# Patient Record
Sex: MALE | Race: WHITE | NOT HISPANIC OR LATINO | URBAN - METROPOLITAN AREA
[De-identification: names, ages, dates, MRNs, and addresses within clinical notes are randomized per-mention and may not be internally consistent; named-entity substitution may affect disease eponyms.]

---

## 2021-06-07 NOTE — PROGRESS NOTES
SUBJECTIVE:   CC: Bryan Salazar is an 41 year old who presents for preventive health visit.     Patient has been advised of split billing requirements and indicates understanding: Yes  Healthy Habits:    Do you get at least three servings of calcium containing foods daily (dairy, green leafy vegetables, etc.)? Yes. Working with a      Amount of exercise or daily activities, outside of work: 30-40 min cardio a day, 45-60 min a day, 5-6 times a week    Problems taking medications regularly No    Medication side effects: Yes - Prior Truvada. Was started on Descovy for renal and liver issues. Still has elevated LFTs.     Have you had an eye exam in the past two years? Yes- glasses and contacts    Do you see a dentist twice per year? Been a year    Do you have sleep apnea, excessive snoring or daytime drowsiness?no    Today's PHQ-2 Score:   PHQ-2 ( 1999 Pfizer) 6/8/2021   Q1: Little interest or pleasure in doing things 0   Q2: Feeling down, depressed or hopeless 0   PHQ-2 Score 0     Abuse: Current or Past(Physical, Sexual or Emotional)- No  Do you feel safe in your environment? Yes        Chosen family in [place]   Sir here in [facility].      Social History     Tobacco Use     Smoking status: Former Smoker     Quit date: 1/1/2018     Years since quitting: 3.4     Smokeless tobacco: Never Used   Substance Use Topics     Alcohol use: Yes     Comment: 1-2 drinks a month     If you drink alcohol do you typically have >3 drinks per day or >7 drinks per week? No                      Last PSA: No results found for: PSA    Reviewed orders with patient. Reviewed health maintenance and updated orders accordingly - Yes    Reviewed and updated as needed this visit by clinical staff  Tobacco  Allergies  Meds   Med Hx  Surg Hx  Fam Hx  Soc Hx        Reviewed and updated as needed this visit by Provider  Tobacco   Meds   Med Hx  Surg Hx  Fam Hx  Soc Hx       Past Medical History:   Diagnosis Date     Hypertension       Past  "Surgical History:   Procedure Laterality Date     NO HISTORY OF SURGERY         ROS:  CONSTITUTIONAL: NEGATIVE for fever, chills, change in weight  INTEGUMENTARY/SKIN: NEGATIVE for worrisome rashes, moles or lesions  EYES: NEGATIVE for vision changes or irritation  ENT: NEGATIVE for ear, mouth and throat problems  RESP: NEGATIVE for significant cough or SOB  CV: NEGATIVE for chest pain, palpitations or peripheral edema  GI: NEGATIVE for nausea, abdominal pain, heartburn, or change in bowel habits   male: negative for dysuria, hematuria, decreased urinary stream, erectile dysfunction, urethral discharge  MUSCULOSKELETAL: NEGATIVE for significant arthralgias or myalgia other than low back pain  NEURO: NEGATIVE for weakness, dizziness or paresthesias  PSYCHIATRIC: NEGATIVE for changes in mood or affect    OBJECTIVE:   /70   Pulse 94   Temp 98.9  F (37.2  C) (Oral)   Resp 16   Ht 1.82 m (5' 11.65\")   Wt 104.4 kg (230 lb 3.2 oz)   SpO2 96%   BMI 31.52 kg/m    EXAM:  GENERAL: healthy, alert and no distress  EYES: Eyes grossly normal to inspection, PERRL and conjunctivae and sclerae normal  HENT: ear canals and TM's normal, nose and mouth without ulcers or lesions  NECK: no adenopathy, no asymmetry, masses, or scars and thyroid normal to palpation  RESP: lungs clear to auscultation - no rales, rhonchi or wheezes  CV: regular rate and rhythm, normal S1 S2, no S3 or S4, no murmur, click or rub, no peripheral edema and peripheral pulses strong  ABDOMEN: soft, nontender, no hepatosplenomegaly, no masses and bowel sounds normal  MS: no gross musculoskeletal defects noted, no edema  SKIN: no suspicious lesions or rashes  NEURO: Normal strength and tone, mentation intact and speech normal  PSYCH: mentation appears normal, affect normal/bright    ASSESSMENT/PLAN:   Bryan was seen today for physical, labs only and back pain.    Diagnoses and all orders for this visit:    Routine general medical examination at [facility]" care facility  Establishing care today.  Please see further below.    Contact with and (suspected) exposure to human immunodeficiency virus (hiv)  Reviewed records, below. Does not need refill at this time.     Essential hypertension  On lisinopril.  Does not need refills today.    BRENDA (obstructive sleep apnea)    Hypogonadism male  Elevated liver enzymes  -     Testosterone total; Future  -     Comprehensive metabolic panel; Future  -     Lipid panel; Future  -     CBC with platelets; Future  Due for hypogonadism labs.  Also known transaminase elevation and is due for recheck.  Concerned that this may be related to testosterone, but may also be related to supplements that he is taking as well as other factors.  We will plan to follow-up in 1 month for this.    Strain of lumbar region, initial encounter  -     cyclobenzaprine (FLEXERIL) 5 MG tablet; Take 1-2 tablets (5-10 mg) by mouth 3 times daily as needed for muscle spasms    History of latent syphilis  Reviewed records below  Immunizations reviewed and up to date  Reviewed records as below, patient requested to send in Covid vaccination card.  Advance care planning    Unknown status of immunity to COVID-19 virus  -     COVID-19 Parth RBD Allison & Titer Reflex; Future      Patient has been advised of split billing requirements and indicates understanding: Yes  COUNSELING:  Reviewed preventive health counseling, as reflected in patient instructions       Regular exercise       Healthy diet/nutrition       Vision screening       Hearing screening       Alcohol Use        Safe sex practices/STD prevention       Syphilis screening for high risk patients        HIV screeninx in teen years, 1x in adult years, and at intervals if high risk     Tdap within 5 years.  COVID shots, Pfizer, in [place] 2021-is sending an immunization card.    Reviewed outside records from [facility].   Reviewed serofast RPR ranging from 1: 2-1: 4 from  "Oct 2018 through May 2021.  Noted documentation of treatment November 2017.  November 2017 titer was 1: 16.  RPR improved to 1: 4 in October 2018.    Note normal liver enzymes in May 2020, with mild persistent elevation of AST and ALT beginning July 2020.  Also note from October 2020, negative hep B surface antigen, negative hep B core antibody, hep B surface antibody showing immunity.  Hep C antibody negative.  From 2018, hepatitis A antibody IgM negative.      Estimated body mass index is 31.52 kg/m  as calculated from the following:    Height as of this encounter: 1.82 m (5' 11.65\").    Weight as of this encounter: 104.4 kg (230 lb 3.2 oz).    Weight management plan: Discussed healthy diet and exercise guidelines    He reports that he quit smoking about 3 years ago. He has never used smokeless tobacco.      Counseling Resources:  ATP IV Guidelines  Pooled Cohorts Equation Calculator  FRAX Risk Assessment  ICSI Preventive Guidelines  Dietary Guidelines for Americans, 2010  USDA's MyPlate  ASA Prophylaxis  Lung CA Screening    Jovani Montesinos DO  [facility]  "

## 2021-06-07 NOTE — PATIENT INSTRUCTIONS
Preventive Health Recommendations  Ages 40 to 49    Yearly exam:             See your health care provider every year in order to  o   Review health changes.   o   Discuss preventive care.    o   Review your medicines if your doctor has prescribed any.    You should be tested each year for STDs (sexually transmitted diseases) if you re at risk.     Have a cholesterol test every 5 years.     Have a colonoscopy (test for colon cancer) if someone in your family has had colon cancer or polyps before age 50.     After age 45, have a diabetes test (fasting glucose). If you are at risk for diabetes, you should have this test every 3 years.      Talk with your health care provider about whether or not a prostate cancer screening test (PSA) is right for you.    Shots: Get a flu shot each year. Get a tetanus shot every 10 years.     Nutrition:    Eat at least 5 servings of fruits and vegetables daily.     Eat whole-grain bread, whole-wheat pasta and brown rice instead of white grains and rice.     Get adequate Calcium and Vitamin D.     Lifestyle    Exercise for at least 150 minutes a week (30 minutes a day, 5 days a week). This will help you control your weight and prevent disease.     Limit alcohol to one drink per day.     No smoking.     Wear sunscreen to prevent skin cancer.     See your dentist every six months for an exam and cleaning.

## 2021-06-08 ENCOUNTER — OFFICE VISIT (OUTPATIENT)
Dept: FAMILY MEDICINE | Facility: CLINIC | Age: 41
End: 2021-06-08
Payer: COMMERCIAL

## 2021-06-08 VITALS
RESPIRATION RATE: 16 BRPM | BODY MASS INDEX: 31.18 KG/M2 | OXYGEN SATURATION: 96 % | WEIGHT: 230.2 LBS | HEIGHT: 72 IN | DIASTOLIC BLOOD PRESSURE: 70 MMHG | HEART RATE: 94 BPM | TEMPERATURE: 98.9 F | SYSTOLIC BLOOD PRESSURE: 106 MMHG

## 2021-06-08 DIAGNOSIS — E29.1 HYPOGONADISM MALE: ICD-10-CM

## 2021-06-08 DIAGNOSIS — Z92.29 IMMUNIZATIONS REVIEWED AND UP TO DATE: ICD-10-CM

## 2021-06-08 DIAGNOSIS — Z71.89 ADVANCE CARE PLANNING: ICD-10-CM

## 2021-06-08 DIAGNOSIS — Z78.9 UNKNOWN STATUS OF IMMUNITY TO COVID-19 VIRUS: ICD-10-CM

## 2021-06-08 DIAGNOSIS — G47.33 OSA (OBSTRUCTIVE SLEEP APNEA): ICD-10-CM

## 2021-06-08 DIAGNOSIS — Z00.00 ROUTINE GENERAL MEDICAL EXAMINATION AT A HEALTH CARE FACILITY: Primary | ICD-10-CM

## 2021-06-08 DIAGNOSIS — Z86.19 HISTORY OF LATENT SYPHILIS: ICD-10-CM

## 2021-06-08 DIAGNOSIS — I10 ESSENTIAL HYPERTENSION: ICD-10-CM

## 2021-06-08 DIAGNOSIS — Z20.6 CONTACT WITH AND (SUSPECTED) EXPOSURE TO HUMAN IMMUNODEFICIENCY VIRUS (HIV): ICD-10-CM

## 2021-06-08 DIAGNOSIS — R74.8 ELEVATED LIVER ENZYMES: ICD-10-CM

## 2021-06-08 DIAGNOSIS — S39.012A STRAIN OF LUMBAR REGION, INITIAL ENCOUNTER: ICD-10-CM

## 2021-06-08 PROCEDURE — 99386 PREV VISIT NEW AGE 40-64: CPT | Mod: 25 | Performed by: STUDENT IN AN ORGANIZED HEALTH CARE EDUCATION/TRAINING PROGRAM

## 2021-06-08 PROCEDURE — 99213 OFFICE O/P EST LOW 20 MIN: CPT | Mod: 25 | Performed by: STUDENT IN AN ORGANIZED HEALTH CARE EDUCATION/TRAINING PROGRAM

## 2021-06-08 RX ORDER — CYCLOBENZAPRINE HCL 5 MG
5-10 TABLET ORAL 3 TIMES DAILY PRN
Qty: 60 TABLET | Refills: 1 | Status: SHIPPED | OUTPATIENT
Start: 2021-06-08 | End: 2022-03-18

## 2021-06-08 RX ORDER — TESTOSTERONE CYPIONATE 200 MG/ML
200 INJECTION, SOLUTION INTRAMUSCULAR WEEKLY
COMMUNITY
Start: 2021-06-08 | End: 2024-05-20

## 2021-06-08 RX ORDER — TRAZODONE HYDROCHLORIDE 50 MG/1
50 TABLET, FILM COATED ORAL AT BEDTIME
COMMUNITY
End: 2021-08-10

## 2021-06-08 RX ORDER — LISINOPRIL 5 MG/1
5 TABLET ORAL DAILY
COMMUNITY
End: 2021-08-10

## 2021-06-08 ASSESSMENT — MIFFLIN-ST. JEOR: SCORE: 1981.69

## 2021-06-11 DIAGNOSIS — E29.1 HYPOGONADISM MALE: ICD-10-CM

## 2021-06-11 DIAGNOSIS — Z78.9 UNKNOWN STATUS OF IMMUNITY TO COVID-19 VIRUS: ICD-10-CM

## 2021-06-11 LAB
ALBUMIN SERPL-MCNC: 4 G/DL (ref 3.4–5)
ALP SERPL-CCNC: 42 U/L (ref 40–150)
ALT SERPL W P-5'-P-CCNC: 127 U/L (ref 0–70)
ANION GAP SERPL CALCULATED.3IONS-SCNC: 5 MMOL/L (ref 3–14)
AST SERPL W P-5'-P-CCNC: 100 U/L (ref 0–45)
BILIRUB SERPL-MCNC: 0.6 MG/DL (ref 0.2–1.3)
BUN SERPL-MCNC: 20 MG/DL (ref 7–30)
CALCIUM SERPL-MCNC: 8.9 MG/DL (ref 8.5–10.1)
CHLORIDE SERPL-SCNC: 105 MMOL/L (ref 94–109)
CHOLEST SERPL-MCNC: 115 MG/DL
CO2 SERPL-SCNC: 26 MMOL/L (ref 20–32)
CREAT SERPL-MCNC: 0.89 MG/DL (ref 0.66–1.25)
ERYTHROCYTE [DISTWIDTH] IN BLOOD BY AUTOMATED COUNT: 14.3 % (ref 10–15)
GFR SERPL CREATININE-BSD FRML MDRD: >90 ML/MIN/{1.73_M2}
GLUCOSE SERPL-MCNC: 80 MG/DL (ref 70–99)
HCT VFR BLD AUTO: 50 % (ref 40–53)
HDLC SERPL-MCNC: 34 MG/DL
HGB BLD-MCNC: 17 G/DL (ref 13.3–17.7)
LDLC SERPL CALC-MCNC: 60 MG/DL
MCH RBC QN AUTO: 29.5 PG (ref 26.5–33)
MCHC RBC AUTO-ENTMCNC: 34 G/DL (ref 31.5–36.5)
MCV RBC AUTO: 87 FL (ref 78–100)
NONHDLC SERPL-MCNC: 81 MG/DL
PLATELET # BLD AUTO: 242 10E9/L (ref 150–450)
POTASSIUM SERPL-SCNC: 4.1 MMOL/L (ref 3.4–5.3)
PROT SERPL-MCNC: 7.7 G/DL (ref 6.8–8.8)
RBC # BLD AUTO: 5.77 10E12/L (ref 4.4–5.9)
SODIUM SERPL-SCNC: 136 MMOL/L (ref 133–144)
TRIGL SERPL-MCNC: 103 MG/DL
WBC # BLD AUTO: 5.9 10E9/L (ref 4–11)

## 2021-06-11 PROCEDURE — 86769 SARS-COV-2 COVID-19 ANTIBODY: CPT | Mod: 59 | Performed by: STUDENT IN AN ORGANIZED HEALTH CARE EDUCATION/TRAINING PROGRAM

## 2021-06-11 PROCEDURE — 36415 COLL VENOUS BLD VENIPUNCTURE: CPT | Performed by: STUDENT IN AN ORGANIZED HEALTH CARE EDUCATION/TRAINING PROGRAM

## 2021-06-11 PROCEDURE — 80053 COMPREHEN METABOLIC PANEL: CPT | Performed by: STUDENT IN AN ORGANIZED HEALTH CARE EDUCATION/TRAINING PROGRAM

## 2021-06-11 PROCEDURE — 86769 SARS-COV-2 COVID-19 ANTIBODY: CPT | Performed by: STUDENT IN AN ORGANIZED HEALTH CARE EDUCATION/TRAINING PROGRAM

## 2021-06-11 PROCEDURE — 84403 ASSAY OF TOTAL TESTOSTERONE: CPT | Mod: 90 | Performed by: STUDENT IN AN ORGANIZED HEALTH CARE EDUCATION/TRAINING PROGRAM

## 2021-06-11 PROCEDURE — 85027 COMPLETE CBC AUTOMATED: CPT | Performed by: STUDENT IN AN ORGANIZED HEALTH CARE EDUCATION/TRAINING PROGRAM

## 2021-06-11 PROCEDURE — 99000 SPECIMEN HANDLING OFFICE-LAB: CPT | Performed by: STUDENT IN AN ORGANIZED HEALTH CARE EDUCATION/TRAINING PROGRAM

## 2021-06-11 PROCEDURE — 80061 LIPID PANEL: CPT | Performed by: STUDENT IN AN ORGANIZED HEALTH CARE EDUCATION/TRAINING PROGRAM

## 2021-06-13 LAB
SARS-COV-2 AB PNL SERPL IA: POSITIVE
SARS-COV-2 IGG SERPL IA-ACNC: NORMAL

## 2021-06-15 ENCOUNTER — MYC MEDICAL ADVICE (OUTPATIENT)
Dept: FAMILY MEDICINE | Facility: CLINIC | Age: 41
End: 2021-06-15

## 2021-06-15 DIAGNOSIS — R74.8 ELEVATED LIVER ENZYMES: Primary | ICD-10-CM

## 2021-06-15 LAB — TESTOST SERPL-MCNC: 726 NG/DL (ref 240–950)

## 2021-06-25 ENCOUNTER — ANCILLARY PROCEDURE (OUTPATIENT)
Dept: ULTRASOUND IMAGING | Facility: CLINIC | Age: 41
End: 2021-06-25
Attending: FAMILY MEDICINE
Payer: COMMERCIAL

## 2021-06-25 DIAGNOSIS — R74.8 ELEVATED LIVER ENZYMES: ICD-10-CM

## 2021-06-25 PROCEDURE — 76705 ECHO EXAM OF ABDOMEN: CPT | Performed by: RADIOLOGY

## 2021-07-09 ENCOUNTER — TELEPHONE (OUTPATIENT)
Dept: FAMILY MEDICINE | Facility: CLINIC | Age: 41
End: 2021-07-09

## 2021-07-09 NOTE — TELEPHONE ENCOUNTER
2021   1:49 PM     Data/Intervention:  Patient Name:  Bryan CORONADO/Age:  1980 (41 year old)    Spoke With: Bryan     Reason for Call:  ACD f/u     Assessment / Plan:  SW reached out to Bryan to see if they had any questions or to see if they needed help filling out the Advanced Care Directive Form that Dr. Montesinos gave them on 21. Bryan stated at this time they have no questions regarding the ACD form.     HAROLDO Solano  Pronouns: She/Her/Hers  , Care Coordination  [facility]  [phone]

## 2021-09-07 ENCOUNTER — LAB (OUTPATIENT)
Dept: LAB | Facility: CLINIC | Age: 41
End: 2021-09-07
Payer: COMMERCIAL

## 2021-09-07 DIAGNOSIS — Z20.6 CONTACT WITH AND (SUSPECTED) EXPOSURE TO HUMAN IMMUNODEFICIENCY VIRUS (HIV): ICD-10-CM

## 2021-09-07 DIAGNOSIS — R74.8 ELEVATED LIVER ENZYMES: ICD-10-CM

## 2021-09-07 LAB
ALBUMIN SERPL-MCNC: 3.8 G/DL (ref 3.4–5)
ALP SERPL-CCNC: 42 U/L (ref 40–150)
ALT SERPL W P-5'-P-CCNC: 102 U/L (ref 0–70)
AST SERPL W P-5'-P-CCNC: 66 U/L (ref 0–45)
BILIRUB DIRECT SERPL-MCNC: <0.1 MG/DL (ref 0–0.2)
BILIRUB SERPL-MCNC: 0.2 MG/DL (ref 0.2–1.3)
PROT SERPL-MCNC: 7.4 G/DL (ref 6.8–8.8)

## 2021-09-07 PROCEDURE — 87389 HIV-1 AG W/HIV-1&-2 AB AG IA: CPT

## 2021-09-07 PROCEDURE — 36415 COLL VENOUS BLD VENIPUNCTURE: CPT

## 2021-09-07 PROCEDURE — 80076 HEPATIC FUNCTION PANEL: CPT

## 2021-09-07 PROCEDURE — 86593 SYPHILIS TEST NON-TREP QUANT: CPT

## 2021-09-07 PROCEDURE — 86780 TREPONEMA PALLIDUM: CPT

## 2021-09-07 PROCEDURE — 86592 SYPHILIS TEST NON-TREP QUAL: CPT

## 2021-09-08 LAB
HIV 1+2 AB+HIV1 P24 AG SERPL QL IA: NONREACTIVE
RPR SER QL: REACTIVE
RPR SER-TITR: ABNORMAL {TITER}
T PALLIDUM AB SER QL: REACTIVE

## 2021-09-09 NOTE — PROGRESS NOTES
Assessment & Plan     Bryan was seen today for recheck.    Diagnoses and all orders for this visit:    PrEP Candidate  Reviewed labs. No concerns. Will approve 3 months prep when refill request comes through.     Elevated liver enzymes  LFTs improving. Will monitor q6 months     Erectile dysfunction, unspecified erectile dysfunction type  -     tadalafil (CIALIS) 5 MG tablet; Take 1 tablet (5 mg) by mouth every 24 hours  New concern today. Has not tried vaso occlusive devices. Is interested in trialing meds. Discussed the side effects of possible headache, flushing, dyspepsia and transient changes in vision.    The patient is not taking nitrates, and denies he has access to nitrates in any form at any time. I have counseled him that taking this med with nitrates of any form can cause death.     Skin tags, anus or rectum  Skin tags noted, offered cryotherapy vs removal/ clipping. Will plan clipping.     Depressed mood  -     Behavioral Health Referral ([facility] internal and external)  Referral resources in process.    Encounter for immunization  -     TDAP VACCINE (Adacel, Boostrix)  [[id number]]      35 minutes spent on the date of the encounter doing chart review, history and exam, documentation and further activities per the note       Return in about 1 week (around 9/17/2021) for procedure.    Jovani Montesinos DO  [facility]    Shana Adams is a 41 year old who presents for the following health issues     HPI     Liver enzymes:  Improving and trailing off. Reviewed results.      Depression:  Looking for LGBT friendly MH provider.     ED:  Difficulty maintaining in. More when not with a partner.     HIV Prevention / PrEP - Follow up  Bryan is here for follow up concerning pre-exposure prophylaxis for HIV.    Ongoing risk factors for acquiring HIV infection:    Patient is member of high prevalence group (MSM, non-monogmous partnership): no/yes: YES    Any anal sex without condoms in the past  6 months: no/yes: YES    Has a current partner that is HIV positive: no/yes: no     ANY Bacterial STI in the last 6 months: no/yes: no     Last HIV test: pos/neg: negative Date: 9/7/21      Review of Systems   DENIES: Denies lymphadenopathy,fevers,chills,rigors,night sweats,weight loss,oral thrush,mouth lesions,dyspnea,cough,diarrhea, edema.  DENIES: Denies  discharge, rash,   Reports ano-genital lesions.         Objective    /78   Pulse 73   Temp 98.9  F (37.2  C) (Oral)   Resp 16   Wt 96.6 kg (213 lb)   BMI 29.17 kg/m    Body mass index is 29.17 kg/m .  Physical Exam  Constitutional:       General: He is not in acute distress.     Appearance: He is well-developed.   HENT:      Head: Normocephalic and atraumatic.   Eyes:      General: No scleral icterus.     Extraocular Movements: Extraocular movements intact.   Cardiovascular:      Rate and Rhythm: Normal rate.      Heart sounds: Normal heart sounds.   Pulmonary:      Effort: Pulmonary effort is normal. No respiratory distress.   Genitourinary:     Rectum: Tenderness present. Normal anal tone.      Comments: 2 anal skin tags   - 9 o clock, irritated  - 1 o clock with verucousappearance  Neurological:      General: No focal deficit present.      Mental Status: He is alert and oriented to person, place, and time.   Psychiatric:         Thought Content: Thought content normal.

## 2021-09-10 ENCOUNTER — OFFICE VISIT (OUTPATIENT)
Dept: FAMILY MEDICINE | Facility: CLINIC | Age: 41
End: 2021-09-10
Payer: COMMERCIAL

## 2021-09-10 VITALS
WEIGHT: 213 LBS | DIASTOLIC BLOOD PRESSURE: 78 MMHG | BODY MASS INDEX: 29.17 KG/M2 | SYSTOLIC BLOOD PRESSURE: 118 MMHG | TEMPERATURE: 98.9 F | HEART RATE: 73 BPM | RESPIRATION RATE: 16 BRPM

## 2021-09-10 DIAGNOSIS — K64.4 SKIN TAGS, ANUS OR RECTUM: ICD-10-CM

## 2021-09-10 DIAGNOSIS — Z23 ENCOUNTER FOR IMMUNIZATION: ICD-10-CM

## 2021-09-10 DIAGNOSIS — Z20.6 CONTACT WITH AND (SUSPECTED) EXPOSURE TO HUMAN IMMUNODEFICIENCY VIRUS (HIV): Primary | ICD-10-CM

## 2021-09-10 DIAGNOSIS — R74.8 ELEVATED LIVER ENZYMES: ICD-10-CM

## 2021-09-10 DIAGNOSIS — N52.9 ERECTILE DYSFUNCTION, UNSPECIFIED ERECTILE DYSFUNCTION TYPE: ICD-10-CM

## 2021-09-10 DIAGNOSIS — R45.89 DEPRESSED MOOD: ICD-10-CM

## 2021-09-10 PROCEDURE — 99214 OFFICE O/P EST MOD 30 MIN: CPT | Mod: 25 | Performed by: STUDENT IN AN ORGANIZED HEALTH CARE EDUCATION/TRAINING PROGRAM

## 2021-09-10 PROCEDURE — 90715 TDAP VACCINE 7 YRS/> IM: CPT | Performed by: STUDENT IN AN ORGANIZED HEALTH CARE EDUCATION/TRAINING PROGRAM

## 2021-09-10 PROCEDURE — 90471 IMMUNIZATION ADMIN: CPT | Performed by: STUDENT IN AN ORGANIZED HEALTH CARE EDUCATION/TRAINING PROGRAM

## 2021-09-10 RX ORDER — TADALAFIL 5 MG/1
5 TABLET ORAL EVERY 24 HOURS
Qty: 30 TABLET | Refills: 1 | Status: SHIPPED | OUTPATIENT
Start: 2021-09-10 | End: 2021-11-03

## 2021-09-10 NOTE — PATIENT INSTRUCTIONS
Call ins about HPV shots (Gardasil)   director to give resources about mental health  Schedule skin tag visit

## 2021-09-17 ENCOUNTER — OFFICE VISIT (OUTPATIENT)
Dept: FAMILY MEDICINE | Facility: CLINIC | Age: 41
End: 2021-09-17
Payer: COMMERCIAL

## 2021-09-17 VITALS
BODY MASS INDEX: 31.19 KG/M2 | TEMPERATURE: 98.7 F | SYSTOLIC BLOOD PRESSURE: 135 MMHG | WEIGHT: 227.8 LBS | DIASTOLIC BLOOD PRESSURE: 77 MMHG | HEART RATE: 84 BPM | OXYGEN SATURATION: 96 % | RESPIRATION RATE: 16 BRPM

## 2021-09-17 DIAGNOSIS — L91.8 SKIN TAG: Primary | ICD-10-CM

## 2021-09-17 DIAGNOSIS — Z23 NEED FOR PROPHYLACTIC VACCINATION AND INOCULATION AGAINST INFLUENZA: ICD-10-CM

## 2021-09-17 PROCEDURE — 11200 RMVL SKIN TAGS UP TO&INC 15: CPT | Performed by: STUDENT IN AN ORGANIZED HEALTH CARE EDUCATION/TRAINING PROGRAM

## 2021-09-17 PROCEDURE — 99207 PR CDG-PROCEDURE CHARGE ONLY: CPT | Performed by: STUDENT IN AN ORGANIZED HEALTH CARE EDUCATION/TRAINING PROGRAM

## 2021-09-17 PROCEDURE — 90686 IIV4 VACC NO PRSV 0.5 ML IM: CPT | Performed by: STUDENT IN AN ORGANIZED HEALTH CARE EDUCATION/TRAINING PROGRAM

## 2021-09-17 PROCEDURE — 90471 IMMUNIZATION ADMIN: CPT | Performed by: STUDENT IN AN ORGANIZED HEALTH CARE EDUCATION/TRAINING PROGRAM

## 2021-09-17 NOTE — PATIENT INSTRUCTIONS
What is a skin biopsy?   A skin biopsy is the removal of a small piece of skin for lab tests. It may be done to help diagnose a problem with the skin.   When is it used?   A skin biopsy will help your healthcare provider make a diagnosis of your problem. For example:   You may have an internal disease that a skin biopsy may explain.   You may have a skin disease or cancer.   Your skin may have become discolored.   Your skin may be inflamed.   Alternatives to this procedure include:   to proceed with treatment without a diagnosis   to choose not to have treatment, recognizing the risks of your condition   to take a watchful approach and reevaluate the lesion or disorder at a future time.   When should I call my healthcare provider?   Call your provider right away if:   You have bleeding that cannot be stopped by putting pressure on the wound.   Your wound becomes red or has pus or you develop a fever (signs of infection).   You have significant pain that is not controlled with ibuprofen or tylenol.     Wound Care  1. Keep it covered for the first 2-3 days with a band aid, or if it is a large wound or is draining a lot, a small piece of gauze with tape.  2. Apply a thin film of vaseline over the area to keep it mildly moist and prevent scab formation.   3. Change your bandaid daily.  4. As you heal, the new skin will be very sensitive to sun - please protect your healing wound by covering up and using sunscreen.   5. Results will be mailed or called to you. It can take up to 10 days to get biopsy results back. If you do not hear from us, please call us at [phone] and let us know that you haven't received your results.

## 2021-09-17 NOTE — PROGRESS NOTES
[facility]  Procedure Note    Bryan Salazar is a patient of Jovani Dang here for skin tag removal   Indication: inflamed skin tag.    Consent: Affirmation of informed consent was obtained. Risks, benefits and alternatives were discussed. Patient's questions were elicited and answered.   Procedure safety checklist was completed:  Yes  Time Out (Pause for the Cause) completed: Yes    Labs: none    Preoperative Diagnosis: Skin tag with verrucous appearance   Postoperative Diagnosis: same    Technique:   Skin prep Chloraprep  Anesthesia -none  Suture  No   EBL: minimal  Complications:  No  Tolerance:  Pt tolerated procedure well and was in stable condition.   Patholgy sent: No- declined    Follow up: Pt was instructed to call if bleeding, severe pain or foul smell.   Follow up in 2-3 months, unless problems.     Jovani Montesinos DO, MA  Pronouns: he/him/his    [facility]/ [facility]    Department of Family Medicine and Community Health       Addendum 8/11/2022  Preoperative diagnosis, skin tag, verrucous, irritated and inflamed  Postop same.    Additional history: Skin tag was removed due to history of bleeding and documented irritation in 9/10/21 progress note. Removal is medically indicated and is not cosmetic in nature.     Jovani Montesinos DO

## 2021-10-19 ENCOUNTER — OFFICE VISIT (OUTPATIENT)
Dept: FAMILY MEDICINE | Facility: CLINIC | Age: 41
End: 2021-10-19
Payer: COMMERCIAL

## 2021-10-19 VITALS
SYSTOLIC BLOOD PRESSURE: 132 MMHG | BODY MASS INDEX: 31.5 KG/M2 | TEMPERATURE: 98.8 F | DIASTOLIC BLOOD PRESSURE: 84 MMHG | RESPIRATION RATE: 16 BRPM | HEART RATE: 77 BPM | WEIGHT: 230 LBS | OXYGEN SATURATION: 96 %

## 2021-10-19 DIAGNOSIS — R30.0 BURNING WITH URINATION: Primary | ICD-10-CM

## 2021-10-19 DIAGNOSIS — N34.2 URETHRITIS: ICD-10-CM

## 2021-10-19 DIAGNOSIS — R36.9 PENILE DISCHARGE: ICD-10-CM

## 2021-10-19 LAB
ALBUMIN UR-MCNC: NEGATIVE MG/DL
APPEARANCE UR: CLEAR
BACTERIA #/AREA URNS HPF: ABNORMAL /HPF
BILIRUB UR QL STRIP: NEGATIVE
COLOR UR AUTO: YELLOW
GLUCOSE UR STRIP-MCNC: NEGATIVE MG/DL
HGB UR QL STRIP: ABNORMAL
HIV 1+2 AB+HIV1 P24 AG SERPL QL IA: NONREACTIVE
KETONES UR STRIP-MCNC: NEGATIVE MG/DL
LEUKOCYTE ESTERASE UR QL STRIP: ABNORMAL
NITRATE UR QL: NEGATIVE
PH UR STRIP: 7 [PH] (ref 5–8)
RBC #/AREA URNS AUTO: ABNORMAL /HPF
SP GR UR STRIP: 1.02 (ref 1–1.03)
UROBILINOGEN UR STRIP-ACNC: 0.2 E.U./DL
WBC #/AREA URNS AUTO: ABNORMAL /HPF

## 2021-10-19 PROCEDURE — 99214 OFFICE O/P EST MOD 30 MIN: CPT | Mod: 25 | Performed by: STUDENT IN AN ORGANIZED HEALTH CARE EDUCATION/TRAINING PROGRAM

## 2021-10-19 PROCEDURE — 96372 THER/PROPH/DIAG INJ SC/IM: CPT | Mod: GC | Performed by: FAMILY MEDICINE

## 2021-10-19 PROCEDURE — 36415 COLL VENOUS BLD VENIPUNCTURE: CPT | Performed by: STUDENT IN AN ORGANIZED HEALTH CARE EDUCATION/TRAINING PROGRAM

## 2021-10-19 PROCEDURE — 81001 URINALYSIS AUTO W/SCOPE: CPT | Performed by: STUDENT IN AN ORGANIZED HEALTH CARE EDUCATION/TRAINING PROGRAM

## 2021-10-19 PROCEDURE — 87591 N.GONORRHOEAE DNA AMP PROB: CPT | Performed by: STUDENT IN AN ORGANIZED HEALTH CARE EDUCATION/TRAINING PROGRAM

## 2021-10-19 PROCEDURE — 87389 HIV-1 AG W/HIV-1&-2 AB AG IA: CPT | Performed by: STUDENT IN AN ORGANIZED HEALTH CARE EDUCATION/TRAINING PROGRAM

## 2021-10-19 PROCEDURE — 87491 CHLMYD TRACH DNA AMP PROBE: CPT | Performed by: STUDENT IN AN ORGANIZED HEALTH CARE EDUCATION/TRAINING PROGRAM

## 2021-10-19 RX ORDER — AZITHROMYCIN 500 MG/1
2000 TABLET, FILM COATED ORAL DAILY
Qty: 4 TABLET | Refills: 0 | Status: SHIPPED | OUTPATIENT
Start: 2021-10-19 | End: 2021-10-20

## 2021-10-19 RX ORDER — CEFTRIAXONE 1 G/1
1000 INJECTION, POWDER, FOR SOLUTION INTRAMUSCULAR; INTRAVENOUS ONCE
Qty: 10 ML | Refills: 0 | Status: SHIPPED | OUTPATIENT
Start: 2021-10-19 | End: 2021-10-19 | Stop reason: ALTCHOICE

## 2021-10-19 RX ORDER — CEFTRIAXONE SODIUM 1 G
1 VIAL (EA) INJECTION ONCE
Status: COMPLETED | OUTPATIENT
Start: 2021-10-19 | End: 2021-10-19

## 2021-10-19 RX ORDER — AZITHROMYCIN 500 MG/1
2000 TABLET, FILM COATED ORAL ONCE
Status: CANCELLED | OUTPATIENT
Start: 2021-10-19 | End: 2021-10-19

## 2021-10-19 RX ADMIN — Medication 1 G: at 11:17

## 2021-10-19 NOTE — PROGRESS NOTES
Assessment & Plan     Burning with urination  Penile discharge  Urethritis  Irritation on ventral glans penis for 4 days with dysuria for 1 day in the setting of history of sex without barrier protection. Physical exam significant for opaque white/yellow urethral discharge, which patient had not previously noted. Given appearance of urethral discharge in the clinical setting of unprotected sex, etiology of urethritis is likely STI. Dysuria could potentially represent a UTI, but this is less likely given lack of other urinary and suprapubic pain. Less likely irritant contact dermatitis given exam, lack of new products on skin or eczema.  Obtained UA with reflex to microscopy and PCR for Chlamydia/gonorrheae. Although patient is on PreP and is adherent to medication, HIV testing was offered today and was accepted by patient. UA returned negative for nitrates, essentially ruling out UTI. PCR pending.  - Plan to prophylactically treat for suspected gonorrheae/chlamydia infection with 1 g ceftriaxone IM injection delivered in clinic. Patient is allergic to Doxycycline so will offer alterative treatment with azithromycin 2g one dose  - advised to refrain from sexual intercourse until symptoms resolve  - advised to make partners aware of current STI work up and treatment  - UA reflex to Microscopic  - HIV Antigen Antibody Combo  - Urine Microscopic Exam  - Chlamydia trachomatis/Neisseria gonorrhoeae by PCR - Clinic Collect  - azithromycin (ZITHROMAX) 500 MG tablet  Dispense: 4 tablet; Refill: 0  - cefTRIAXone (ROCEPHIN) injection 1 g    No follow-ups on file.    Lalita Escobedo, MS3  [facility]    I was present with the medical student who participated in the service and in the documentation of this note. I have verified the history and personally performed the physical exam and medical decision making. I have verified and edited the note, which accurately reflects my assessment of the patient and the  plan of care.    Lyubov Cervantes, DO   she/her  PGY-2    Subjective   Bryan is a 41 year old who presents for the following health issues    HPI   Noticed irritation on underside of penis on Saturday when erect and with contact/friction.   Over last 24 hours, has developed burning with urination.   Has had PA piercing before. Reports irritation feels similar. Piercing not currently in.   Denies any new lubrication or products on/around penis    Endorses some swelling on the underside of penis. Possibly some redness/skin irritation. Has not noticed ulcers, wounds, blisters, or other lesions. Has not noticed blood, pus, or other discharge from penis.   No blood in urine. No suprapubic, abdominal, or back pain. No urinary frequency. No use of new lubricants or products that have come in contact with skin of penis.   NOTE: urethral discharge present on physical exam (see below). This was the first time he had observed discharge.     Is circumcised.    3 sexual partners, 2 new. All partners male. Engages in penetrative and receptive anal and oral sex.   Does not use condoms.  Discussed STI and HIV status with new partners. Neither were concerned for STI.   On PreP. Does not miss doses. Negative HIV anitgen/antibody testing in September.    History of latent syphilis and gonorrhea years ago. Treponema antibodies titers remain low (1:2) from labs in 9/2021  Treatment of gonorrhea years ago led to discovery of doxycycline allergy (anaphylaxis)    Review of Systems   Constitutional, HEENT, cardiovascular, pulmonary, gi and gu systems are negative, except as otherwise noted.      Objective    /84   Pulse 77   Temp 98.8  F (37.1  C) (Oral)   Resp 16   Wt 104.3 kg (230 lb)   SpO2 96%   BMI 31.50 kg/m    Body mass index is 31.5 kg/m .     Physical Exam   GENERAL: healthy, alert and no distress   (male): Normal male genitalia. No ulcers or lesions. Skin of ventral glans penis slightly erythematous.  Opaque off  white/slightly yellow discharge from urethra and in site of piercing on ventral side of glans penis. No tenderness.     Results for orders placed or performed in visit on 10/19/21 (from the past 24 hour(s))   UA reflex to Microscopic   Result Value Ref Range    Color Urine Yellow Colorless, Straw, Light Yellow, Yellow    Appearance Urine Clear Clear    Glucose Urine Negative Negative mg/dL    Bilirubin Urine Negative Negative    Ketones Urine Negative Negative mg/dL    Specific Gravity Urine 1.020 1.005 - 1.030    Blood Urine Moderate (A) Negative    pH Urine 7.0 5.0 - 8.0    Protein Albumin Urine Negative Negative mg/dL    Urobilinogen Urine 0.2 0.2, 1.0 E.U./dL    Nitrite Urine Negative Negative    Leukocyte Esterase Urine Moderate (A) Negative   Urine Microscopic Exam   Result Value Ref Range    Bacteria Urine Few (A) None Seen /HPF    RBC Urine 5-10 (A) 0-2 /HPF /HPF    WBC Urine 10-25 (A) 0-5 /HPF /HPF    Narrative    Some Clumps of WBC seen       ----- Services Performed by a MEDICAL STUDENT in Presence of RESIDENT/FELLOW Physician-------

## 2021-10-19 NOTE — NURSING NOTE
Clinic Administered Medication Documentation          Injectable Medication Documentation    Patient was given Ceftriaxone Sodium (Rocephin). Prior to medication administration, verified patients identity using patient s name and date of birth. Please see MAR and medication order for additional information. Patient instructed to remain in clinic for 15 minutes.      Was entire vial of medication used? Yes  Vial/Syringe: Single dose vial  Expiration Date:  02/2022  Was this medication supplied by the patient? No    Name of provider who requested the medication administration: Dr. Cervantes  Name of provider on site (faculty or community preceptor) at the time of performing the medication administration: Dr.Keyhani Kiki Velez, [facility]

## 2021-10-19 NOTE — PATIENT INSTRUCTIONS
Patient Education   Here is the plan from today's visit    1. Burning with urination  We will call you if the results of your test is positive. We are treating you prophylactically with one IM antibiotic and the other is 4 pills to be taken at once later today.  Please tell your sexual partners that they should get tested for STIs    - UA reflex to Microscopic; Future  - HIV Antigen Antibody Combo; Future  - HIV Antigen Antibody Combo  - cefTRIAXone (ROCEPHIN) injection 1 g  - UA reflex to Microscopic  - Chlamydia trachomatis/Neisseria gonorrhoeae by PCR - Clinic Collect  - azithromycin (ZITHROMAX) 500 MG tablet; Take 4 tablets (2,000 mg) by mouth daily for 1 day  Dispense: 4 tablet; Refill: 0  - cefTRIAXone (ROCEPHIN) injection 1 g      Please call or return to clinic if your symptoms don't go away.    Follow up plan  No follow-ups on file.    Thank you for coming to [facility] today.  COVID-19 Vaccine:  [facility] has walk-in appointments for COVID-19 vaccines. No appointment needed!   You also have the option of receiving Pfizer vaccine during your physician appointment. Please ask your care team for more information!  Lab Testing:  **If you had lab testing today and your results are reassuring or normal they will be mailed to you or sent through [employer] within 7 days.   **If the lab tests need quick action we will call you with the results.  **If you are having labs done on a different day, please call [phone] to schedule at [facility] or [phone] for other [facility] locations.   The phone number we will call with results is # [phone] (home) . If this is not the best number please call our clinic and change the number.  Medication Refills:  If you need any refills please call your pharmacy and they will contact us.   If you need to  your refill at a new pharmacy, please contact the new pharmacy directly. The new pharmacy will help you get your medications  transferred faster.   Scheduling:  If you have any concerns about today's visit or wish to schedule another appointment please call our office during normal business hours [phone] (8-5:00 M-F)  If a referral was made to a [facility] and you don't get a call from central scheduling please call [phone].  If a Mammogram was ordered for you at [facility] call [phone] to schedule or change your appointment.  If you had an EKG/XRay/CT/Ultrasound/MRI ordered the number is [phone] to schedule or change your radiology appointment.   Medical Concerns:  If you have urgent medical concerns please call [phone] at any time of the day.    Lyubov Cervantes, DO       Patient Education     Urethritis Due to Gonorrhea or Chlamydia (Adult male)     You have urethritis. This is an inflammation in the urethra. The urethra is the tube between the bladder and the tip of the penis. Urine drains out of the body through the urethra. There are 2 main types of this condition:    Gonococcal urethritis (). This is an infection caused by gonorrhea.    Nongonococcal urethritis (ALLEN). This is an infection that is often caused by chlamydia. Other infections can also be the cause.  Men are more likely to have symptoms, but may not. Symptoms can start within 1 week after exposure to an infection. But they can take a month or more to appear. Or they may not even occur. Some symptoms are:    Burning or pain when urinating    Irritation in the penis    Pus discharge from the penis    Pain and possible swelling in one or both testicles  Infections in the urethra are often caused by a sexually transmitted infection (STI). The most common STIs are gonorrhea, chlamydia, or both.  Gonococcal urethritis () is an infection of the urethra. It's caused by gonorrhea. Gonorrhea is a sexually transmitted infection (STI). Gonorrhea can also be in other areas of the body. This can cause:    Rectal pain and  discharge    Throat infection    Eye infections (conjunctivitis)  Without treatment, the infection can get worse and spread to other parts of your body. The infection can cause rashes, arthritis, and infections in your joints, heart, and brain.  Nongonococcal urethritis (ALLEN) is an infection of the urethra. It's often caused by chlamydia. Symptoms may clear up in a few weeks or months, even without treatment. But without treatment, the bacteria that cause ALLEN can stay in the urethra. This means that even if symptoms clear up, you can still have an infection. You can spread it to others if you are not treated.  Urethritis caused by an infection can be cured. But it needs to be treated with antibiotics. If you don't get treated, you can give it to someone else. If you give it to a woman, it can cause a serious pelvic infection. She may not be able to have children (infertility).  It's important to remember that you can have an infection without symptoms. For this reason, your sex partner needs to be treated, even if they have no symptoms. If they are not treated, and you keep having sex, you will be infected again. Your partner should contact their own healthcare provider to be checked and treated. An urgent care clinic or the Public Health Department can also do this.  Home care  These guidelines will help you care for yourself at home:    Take all the antibiotics you were given until they are used up. It's important to finish them, even if you are feeling better. This ensures the infection is completely cleared up.    No sex until both you and your partner have finished all the antibiotics, and your healthcare provider says you are no longer contagious.    You can take acetaminophen or ibuprofen for pain, unless you were given a different pain medicine. Talk with your healthcare provider before using these medicines if you:  ? Have long-term (chronic) liver or kidney disease  ? Have ever had a stomach ulcer or GI  bleeding  ? Are taking blood thinners    Don t take aspirin (or medicine that contains aspirin) if you are younger than age 19 unless directed by your child s provider. Taking aspirin can put them at risk for Reye syndrome. This is a rare but very serious disorder. It most often affects the brain and the liver.    Learn about and use safe sex practices. The safest sex is with a partner who has tested negative and only has sex with you. Condoms can keep some STIs from spreading. These include gonorrhea, chlamydia, and HIV. But condoms can't guarantee you won't get these diseases.  Follow-up care  Follow up with your healthcare provider, or as advised. If a culture test was taken, you may call for the results as directed. Another culture test should be done 4 to 6 weeks after treatment to be sure the infection is gone. Follow up with your healthcare provider or the Public Health Department for a complete STI screening, including HIV testing. For more information about STIs, contact CDC-INFO at 354-763-0599.  When to get medical advice  Call your healthcare provider right away if any of these occur:    No improvement after 3 days of treatment, although some symptoms can last longer    Unable to urinate    Rash or joint pain    Painful sores on the penis    Enlarged painful lymph nodes (lumps) in the groin    Testicle pain or swelling of the scrotum  [employer] last reviewed this educational content on 12/1/2019 2000-2021 [employer]. All rights reserved. This information is not intended as a substitute for professional medical care. Always follow your healthcare professional's instructions.

## 2021-10-20 LAB
C TRACH DNA SPEC QL PROBE+SIG AMP: NEGATIVE
N GONORRHOEA DNA SPEC QL NAA+PROBE: POSITIVE

## 2021-10-20 NOTE — PROGRESS NOTES
Preceptor Attestation:   Patient seen, evaluated and discussed with the resident. I have verified the content of the note, which accurately reflects my assessment of the patient and the plan of care.   Supervising Physician:  Victoriano Oakes MD

## 2021-11-03 DIAGNOSIS — N52.9 ERECTILE DYSFUNCTION, UNSPECIFIED ERECTILE DYSFUNCTION TYPE: ICD-10-CM

## 2021-11-03 RX ORDER — TADALAFIL 5 MG/1
5 TABLET ORAL EVERY 24 HOURS
Qty: 30 TABLET | Refills: 1 | Status: SHIPPED | OUTPATIENT
Start: 2021-11-03 | End: 2021-12-03

## 2021-11-03 NOTE — TELEPHONE ENCOUNTER
"Request for medication refill: tadalafil (CIALIS) 5 MG tablet    Providers if patient needs an appointment and you are willing to give a one month supply please refill for one month and  send a letter/MyChart using \".SMILLIMITEDREFILL\" .smillimited and route chart to \"[facility] \" (Giving one month refill in non controlled medications is strongly recommended before denial)    If refill has been denied, meaning absolutely no refills without visit, please complete the smart phrase \".smirxrefuse\" and route it to the \"[facility] MED REFILLS\"  pool to inform the patient and the pharmacy.    Shantal Carias MA        "

## 2021-11-17 ENCOUNTER — OFFICE VISIT (OUTPATIENT)
Dept: FAMILY MEDICINE | Facility: CLINIC | Age: 41
End: 2021-11-17
Payer: COMMERCIAL

## 2021-11-17 VITALS
HEART RATE: 66 BPM | BODY MASS INDEX: 32.92 KG/M2 | TEMPERATURE: 99 F | OXYGEN SATURATION: 97 % | DIASTOLIC BLOOD PRESSURE: 84 MMHG | WEIGHT: 240.4 LBS | SYSTOLIC BLOOD PRESSURE: 138 MMHG | RESPIRATION RATE: 16 BRPM

## 2021-11-17 DIAGNOSIS — M67.90 TENDINOPATHY: Primary | ICD-10-CM

## 2021-11-17 PROCEDURE — 90471 IMMUNIZATION ADMIN: CPT | Performed by: STUDENT IN AN ORGANIZED HEALTH CARE EDUCATION/TRAINING PROGRAM

## 2021-11-17 PROCEDURE — 99213 OFFICE O/P EST LOW 20 MIN: CPT | Mod: 25 | Performed by: STUDENT IN AN ORGANIZED HEALTH CARE EDUCATION/TRAINING PROGRAM

## 2021-11-17 PROCEDURE — 90651 9VHPV VACCINE 2/3 DOSE IM: CPT | Performed by: STUDENT IN AN ORGANIZED HEALTH CARE EDUCATION/TRAINING PROGRAM

## 2021-11-17 NOTE — PROGRESS NOTES
Assessment & Plan     Tendinopathy  Clinical signs and symptoms consistent with ECU tendinopathy related to overuse injury while weight lifting. No bony tenderness on exam warranting imaging at this time. Will try hand therapy and gradual return to activity, return if not improving.   - Occupational Therapy Referral; Future    Return if symptoms worsen or fail to improve.    Marissa Garcia MD  [facility] ASHA Adams is a 41 year old with hypertension who presents for the following health issues   Patient presents with:  Musculoskeletal Problem: right wrist pain x 1 month    HPI     Right wrist pain     Onset: Believes he may have twisted his wrist at gym 1 month ago     Injury?  Maybe overuse, no particular injury     Description:   Location(s): medial side of right wrist   Character: strain    Intensity: mild- nagging pain now    Progression of Symptoms: better    Accompanying Signs & Symptoms:  Other symptoms: none  Fevers, chills, night sweats: no  Warmth, redness: no    History:   Previous similar pain: no - not on medial side of wrist, did have prior issues with lateral wrist     Worsened by    Overuse?: Yes seems worse when weight lifting, improves with rest   Morning Stiffness?:no    Alleviating factors:  Improved by: rest/inactivity    Dumbbell bench press seems to be worst   Took some time off at the gym and improved  Ulnar side of wrist  Prior radial side of wrist injury    Review of Systems    ROS: 10 point ROS neg other than the symptoms noted above in the HPI.       Objective    /84   Pulse 66   Temp 99  F (37.2  C) (Oral)   Resp 16   Wt 109 kg (240 lb 6.4 oz)   SpO2 97%   BMI 32.92 kg/m    Body mass index is 32.92 kg/m .  Physical Exam       WRIST:  Inspection: no swelling, deformity, erythema  Palpation: Tender: none  Non-tender: distal radius, distal ulna, TFCC, extension tendons, scaphoid, snuff box  Range of Motion: normal  Strength: no  deficits  Special tests: negative Tinel's at carpal tunnel.  , negative Finkelstein's.        No results found for any visits on 11/17/21.

## 2021-11-30 ENCOUNTER — THERAPY VISIT (OUTPATIENT)
Dept: OCCUPATIONAL THERAPY | Facility: CLINIC | Age: 41
End: 2021-11-30
Payer: COMMERCIAL

## 2021-11-30 DIAGNOSIS — M67.90 TENDINOPATHY: ICD-10-CM

## 2021-11-30 DIAGNOSIS — M79.641 PAIN OF RIGHT HAND: ICD-10-CM

## 2021-11-30 PROCEDURE — 97035 APP MDLTY 1+ULTRASOUND EA 15: CPT | Mod: GO | Performed by: OCCUPATIONAL THERAPIST

## 2021-11-30 PROCEDURE — 97165 OT EVAL LOW COMPLEX 30 MIN: CPT | Mod: GO | Performed by: OCCUPATIONAL THERAPIST

## 2021-11-30 PROCEDURE — 97140 MANUAL THERAPY 1/> REGIONS: CPT | Mod: GO | Performed by: OCCUPATIONAL THERAPIST

## 2021-11-30 NOTE — PROGRESS NOTES
Hand Therapy Initial Evaluation    Current Date:  11/30/2021  Referring Physician:Marissa Garcia MD    Diagnosis: Right Wrist and thumb pain  DOI: 10/15/21    Subjective:  Bryan Salazar is a 41 year old right hand dominant male.  Answers for HPI/ROS submitted by the patient on 11/27/2021  Reason for Visit:: Wrist Pain  When problem began:: 10/15/2021  How problem occurred:: Unsure  Number scale: 2/10  General health as reported by patient: good  Please check all that apply to your current or past medical history: high blood pressure  Medical allergies: none  Surgeries: none  Medications you are currently taking: anti-depressants, high blood pressure medication, muscle relaxants, sleep medication  What are your primary job tasks: computer work, prolonged sitting, prolonged standing    Patient reports symptoms of pain of the right wrist which occurred due to an unknown etiology but possible overuse with lifting weights. Since onset symptoms are Unchanged  Special tests:  none.  Previous treatment: brace.        Occupational Profile Information:  Current occupation is   Currently working in normal job without restrictions  Prior functional level:  no limitations  Barriers include:none  Mobility: No difficulty  Transportation: drives  Leisure activities/hobbies: gym, board games    Upper Extremity Functional Index Score:  SCORE:   Column Totals: /80: (P) 79   (A lower score indicates greater disability.)      Objective:  Pain Level (Scale 0-10)   11/30/2021   At Rest 4/10   With Use 5/10     Pain Description  Date 11/30/2021   Location Radial wrist and thumb   Pain Quality Dull   Frequency intermittent     Pain is worst  daytime   Exacerbated by  gripping, weight bearing   Relieved by cold and rest   Progression unchanged     Edema  None    Sensation   WNL throughout all nerve distributions; per patient report    Edema   - none  + mild    ++ moderate    +++ severe   Right 11/30/2021   1st DC -    Radial Styloid -      ROM  Pain Report: - none  + mild    ++ moderate    +++ severe   Thumb   11/30/2021 11/30/2021   AROM  (PROM) Left Right   MP  50   IP  75   RABD 55 50   PABD 55 45   Opposition 10/10 9/10+     Wrist 11/30/2021 11/30/2021   AROM (PROM) Left Right   Extension 70 65   Flexion 55 60   RD 25 15   UD 45 40   UD with Th Flex 25 20+     Resisted Testing  Pain Report: - none  + mild    ++ moderate    +++ severe   Right 11/30/2021   APL +   EPB +   EPL -   FCR +   Radial Deviation +   Ulnar Deviation -         Strength   (Measured in pounds)  Pain Report: - none  + mild    ++ moderate    +++ severe    11/30/2021 11/30/2021   Trials Left Right   1  2  3 58  55  42 49  56  54   Average 52 53     Lat Pinch 11/30/2021 11/30/2021   Trials Left Right   1  2  3 15 20   Average       3 Pt Pinch 11/30/2021 11/30/2021   Trials Left Right   1  2  3 8 12   Average       Special Tests   Pain Report:  - none    + mild    ++ moderate    +++ severe    11/30/2021   Finkelsteins -   Radial Nerve Tinel's (DRSN) -   Th CMC Grind +   Th CMC Passive Retropulsion -   WHAT test +     Palpation  Pain Report:  - none    + mild    ++ moderate    +++ severe   Right 11/30/2021   Radial Styloid -   1st DC -   FCR +   Thumb CMC +    Thenar (base of thumb) +   PIN Site -   Extensor Wad -     Assessment:  Patient presents with symptoms consistent with diagnosis of thumb and wrist pain with conservative intervention.     Patient's limitations or Problem List includes:  Pain, Decreased ROM/motion, Weakness, Decreased  and pinch strength and tightness in musculature of the right wrist and thumb which interferes with the patient's ability to perform Recreational Activities and Household Chores as compared to previous level of function.    Rehab Potential:  Excellent - Return to full activity, no limitations    Patient will benefit from skilled Occupational Therapy to increase wrist and thumb ROM, flexibility,  strength and  pinch strength and decrease pain to return to previous activity level and resume normal daily tasks and to reach their rehab potential.    Barriers to Learning:  No barrier    Communication Issues:  Patient appears to be able to clearly communicate and understand verbal and written communication and follow directions correctly.    Chart Review: Chart Review, Brief history including review of medical and/or therapy records relating to the presenting problem and Simple history review with patient    Identified Performance Deficits: home establishment and management, meal preparation and cleanup and leisure activities    Assessment of Occupational Performance:  1-3 Performance Deficits    Clinical Decision Making (Complexity): Low complexity    Treatment Explanation:  The following has been discussed with the patient:    RX ordered/plan of care  Anticipated outcomes  Possible risks and side effects    Plan:  Frequency:  1 X week, once daily  Duration:  for 6 weeks    Treatment Plan:    Modalities:    US   Therapeutic Exercise:   AROM of wrist and thumb  PROM with stretch to wrist and thumb extensors   Manual Techniques:   Myofascial release of the thumb  and flexors  Neuromuscular:   Steve taping  Orthotic Fabrication:    Thumb spica orthosis  Self Care:   Ergonomic consideration   Diagnostic education    Discharge Plan:    Achieve all LTG.  Independent in home treatment program.  Reach maximal therapeutic benefit.    Home Program:   Warmth for stiffness  Ice after activity for pain  Thumb spica orthosis (Comfort Cool) for sleeping and per symptoms day  Avoid activities that exacerbate pain in the wrist or thumb  Self MFR to thenar    Next Visit:  US  MFR to thenar and radial wrist  AROM of wrist and thumb

## 2021-12-02 ENCOUNTER — E-VISIT (OUTPATIENT)
Dept: URGENT CARE | Facility: CLINIC | Age: 41
End: 2021-12-02
Payer: COMMERCIAL

## 2021-12-02 DIAGNOSIS — Z20.822 SUSPECTED COVID-19 VIRUS INFECTION: Primary | ICD-10-CM

## 2021-12-02 PROCEDURE — 99421 OL DIG E/M SVC 5-10 MIN: CPT | Performed by: PHYSICIAN ASSISTANT

## 2021-12-02 NOTE — PATIENT INSTRUCTIONS
Dear Bryan Salazar,    Your symptoms show that you may have coronavirus (COVID-19). This illness can cause fever, cough and trouble breathing. Many people get a mild case and get better on their own. Some people can get very sick.    Will I be tested for COVID-19?  We would like to test you for Covid-19 virus. I have placed orders for this test.     To schedule: go to your [employer] home page and scroll down to the section that says  You have an appointment that needs to be scheduled  and click the large green button that says  Schedule Now  and follow the steps to find the next available openings.    If you are unable to complete these [employer] scheduling steps, please call [phone] to schedule your testing.     Return to work/school/ guidance:  Please let your workplace manager and staffing office know when your quarantine ends     We can t give you an exact date as it depends on the above. You can calculate this on your own or work with your manager/staffing office to calculate this. (For example if you were exposed on 10/4, you would have to quarantine for 14 full days. That would be through 10/18. You could return on 10/19.)      If you receive a positive COVID-19 test result, follow the guidance of the those who are giving you the results. Usually the return to work is 10 (or in some cases 20 days from symptom onset.) If you work at [employer], you must also be cleared by Employee Occupational Health and Safety to return to work.        If you receive a negative COVID-19 test result and did not have a high risk exposure to someone with a known positive COVID-19 test, you can return to work once you're free of fever for 24 hours without fever-reducing medication and your symptoms are improving or resolved.      If you receive a negative COVID-19 test and If you had a high risk exposure to someone who has tested positive for COVID-19 then you can return to work 14 days after your last contact with  the positive individual    Note: If you have ongoing exposure to the covid positive person, this quarantine period may be more than 14 days. (For example, if you are continued to be exposed to your child who tested positive and cannot isolate from them, then the quarantine of 7-14 days can't start until your child is no longer contagious. This is typically 10 days from onset of the child's symptoms. So the total duration may be 17-24 days in this case.)    Sign up for [employer].   We know it's scary to hear that you might have COVID-19. We want to track your symptoms to make sure you're okay over the next 2 weeks. Please look for an email from [employer]--this is a free, online program that we'll use to keep in touch. To sign up, follow the link in the email you will receive. Learn more at [email or web address]    How can I take care of myself?    Get lots of rest. Drink extra fluids (unless a doctor has told you not to)    Take Tylenol (acetaminophen) or ibuprofen for fever or pain. If you have liver or kidney problems, ask your family doctor if it's okay to take Tylenol o ibuprofen    If you have other health problems (like cancer, heart failure, an organ transplant or severe kidney disease): Call your specialty clinic if you don't feel better in the next 2 days.    Know when to call 911. Emergency warning signs include:  o Trouble breathing or shortness of breath  o Pain or pressure in the chest that doesn't go away  o Feeling confused like you haven't felt before, or not being able to wake up  o Bluish-colored lips or face    Where can I get more information?   [employer] - About COVID-19:   [email or web address]    CDC - What to Do If You're Sick:   www.cdc.gov/coronavirus/2019-ncov/about/steps-when-sick.html    December 2, 2021  RE:  Bryan Salazar                                                                                                                  [id number]  [place]  [place] [facility] [address]      To whom it may concern:    I evaluated Bryan Salazar on December 2, 2021. Bryan Salazar should be excused from work/school.     They should let their workplace manager and staffing office know when their quarantine ends.    We can not give an exact date as it depends on the information below. They can calculate this on their own or work with their manager/staffing office to calculate this. (For example if they were exposed on 10/04, they would have to quarantine for 14 full days. That would be through 10/18. They could return on 10/19.)    Quarantine Guidelines:      If patient receives a positive COVID-19 test result, they should follow the guidance of those who are giving the results. Usually the return to work is 10 (or in some cases 20 days from symptom onset.) If they work at [employer], they must be cleared by Employee Occupational Health and Safety to return to work.        If patient receives a negative COVID-19 test result and did not have a high risk exposure to someone with a known positive COVID-19 test, they can return to work once they're free of fever for 24 hours without fever-reducing medication and their symptoms are improving or resolved.      If patient receives a negative COVID-19 test and if they had a high risk exposure to someone who has tested positive for COVID-19 then they can return to work 14 days after their last contact with the positive individual    Note: If there is ongoing exposure to the covid positive person, this quarantine period may be longer than 14 days. (For example, if they are continually exposed to their child, who tested positive and cannot isolate from them, then the quarantine of 7-14 days can't start until their child is no longer contagious. This is typically 10 days from onset to the child's symptoms. So the total duration may be 17-24 days in this case.)     Sincerely,  Rob Augustine PA-C

## 2021-12-03 ENCOUNTER — LAB (OUTPATIENT)
Dept: URGENT CARE | Facility: URGENT CARE | Age: 41
End: 2021-12-03
Attending: PHYSICIAN ASSISTANT
Payer: COMMERCIAL

## 2021-12-03 DIAGNOSIS — N52.9 ERECTILE DYSFUNCTION, UNSPECIFIED ERECTILE DYSFUNCTION TYPE: ICD-10-CM

## 2021-12-03 DIAGNOSIS — Z20.822 SUSPECTED COVID-19 VIRUS INFECTION: ICD-10-CM

## 2021-12-03 PROCEDURE — U0003 INFECTIOUS AGENT DETECTION BY NUCLEIC ACID (DNA OR RNA); SEVERE ACUTE RESPIRATORY SYNDROME CORONAVIRUS 2 (SARS-COV-2) (CORONAVIRUS DISEASE [COVID-19]), AMPLIFIED PROBE TECHNIQUE, MAKING USE OF HIGH THROUGHPUT TECHNOLOGIES AS DESCRIBED BY CMS-2020-01-R: HCPCS

## 2021-12-03 PROCEDURE — U0005 INFEC AGEN DETEC AMPLI PROBE: HCPCS

## 2021-12-03 RX ORDER — TADALAFIL 5 MG/1
5 TABLET ORAL EVERY 24 HOURS
Qty: 30 TABLET | Refills: 11 | Status: SHIPPED | OUTPATIENT
Start: 2021-12-03 | End: 2022-04-08

## 2021-12-03 NOTE — TELEPHONE ENCOUNTER
"Request for medication refill: tadalafil (CIALIS) 5 MG tablet    Providers if patient needs an appointment and you are willing to give a one month supply please refill for one month and  send a letter/MyChart using \".SMILLIMITEDREFILL\" .smillimited and route chart to \"[facility] \" (Giving one month refill in non controlled medications is strongly recommended before denial)    If refill has been denied, meaning absolutely no refills without visit, please complete the smart phrase \".smirxrefuse\" and route it to the \"[facility] MED REFILLS\"  pool to inform the patient and the pharmacy.    Venecia Gilliland        "

## 2021-12-04 LAB — SARS-COV-2 RNA RESP QL NAA+PROBE: NEGATIVE

## 2021-12-07 ENCOUNTER — THERAPY VISIT (OUTPATIENT)
Dept: OCCUPATIONAL THERAPY | Facility: CLINIC | Age: 41
End: 2021-12-07
Payer: COMMERCIAL

## 2021-12-07 DIAGNOSIS — M79.641 PAIN OF RIGHT HAND: ICD-10-CM

## 2021-12-07 PROCEDURE — 97140 MANUAL THERAPY 1/> REGIONS: CPT | Mod: GO | Performed by: OCCUPATIONAL THERAPIST

## 2021-12-07 PROCEDURE — 97110 THERAPEUTIC EXERCISES: CPT | Mod: GO | Performed by: OCCUPATIONAL THERAPIST

## 2021-12-07 PROCEDURE — 97035 APP MDLTY 1+ULTRASOUND EA 15: CPT | Mod: GO | Performed by: OCCUPATIONAL THERAPIST

## 2021-12-13 ENCOUNTER — OFFICE VISIT (OUTPATIENT)
Dept: FAMILY MEDICINE | Facility: CLINIC | Age: 41
End: 2021-12-13
Payer: COMMERCIAL

## 2021-12-13 VITALS
HEART RATE: 70 BPM | TEMPERATURE: 98.1 F | OXYGEN SATURATION: 96 % | RESPIRATION RATE: 16 BRPM | HEIGHT: 72 IN | SYSTOLIC BLOOD PRESSURE: 114 MMHG | DIASTOLIC BLOOD PRESSURE: 76 MMHG | BODY MASS INDEX: 32.51 KG/M2 | WEIGHT: 240 LBS

## 2021-12-13 DIAGNOSIS — Z11.4 SCREENING FOR HIV (HUMAN IMMUNODEFICIENCY VIRUS): ICD-10-CM

## 2021-12-13 DIAGNOSIS — Z20.6 CONTACT WITH AND (SUSPECTED) EXPOSURE TO HUMAN IMMUNODEFICIENCY VIRUS (HIV): Primary | ICD-10-CM

## 2021-12-13 DIAGNOSIS — Z11.3 ROUTINE SCREENING FOR STI (SEXUALLY TRANSMITTED INFECTION): ICD-10-CM

## 2021-12-13 DIAGNOSIS — R74.8 ABNORMAL TRANSAMINASES: ICD-10-CM

## 2021-12-13 LAB
ALBUMIN SERPL-MCNC: 3.9 G/DL (ref 3.4–5)
ALP SERPL-CCNC: 53 U/L (ref 40–150)
ALT SERPL W P-5'-P-CCNC: 70 U/L (ref 0–70)
AST SERPL W P-5'-P-CCNC: 45 U/L (ref 0–45)
BILIRUB DIRECT SERPL-MCNC: 0.1 MG/DL (ref 0–0.2)
BILIRUB SERPL-MCNC: 1.1 MG/DL (ref 0.2–1.3)
HIV 1+2 AB+HIV1 P24 AG SERPL QL IA: NONREACTIVE
PROT SERPL-MCNC: 7.9 G/DL (ref 6.8–8.8)

## 2021-12-13 PROCEDURE — 36415 COLL VENOUS BLD VENIPUNCTURE: CPT | Performed by: STUDENT IN AN ORGANIZED HEALTH CARE EDUCATION/TRAINING PROGRAM

## 2021-12-13 PROCEDURE — 86593 SYPHILIS TEST NON-TREP QUANT: CPT | Performed by: STUDENT IN AN ORGANIZED HEALTH CARE EDUCATION/TRAINING PROGRAM

## 2021-12-13 PROCEDURE — 99214 OFFICE O/P EST MOD 30 MIN: CPT | Performed by: STUDENT IN AN ORGANIZED HEALTH CARE EDUCATION/TRAINING PROGRAM

## 2021-12-13 PROCEDURE — 86592 SYPHILIS TEST NON-TREP QUAL: CPT | Performed by: STUDENT IN AN ORGANIZED HEALTH CARE EDUCATION/TRAINING PROGRAM

## 2021-12-13 PROCEDURE — 80076 HEPATIC FUNCTION PANEL: CPT | Performed by: STUDENT IN AN ORGANIZED HEALTH CARE EDUCATION/TRAINING PROGRAM

## 2021-12-13 PROCEDURE — 87389 HIV-1 AG W/HIV-1&-2 AB AG IA: CPT | Performed by: STUDENT IN AN ORGANIZED HEALTH CARE EDUCATION/TRAINING PROGRAM

## 2021-12-13 PROCEDURE — 87491 CHLMYD TRACH DNA AMP PROBE: CPT | Performed by: STUDENT IN AN ORGANIZED HEALTH CARE EDUCATION/TRAINING PROGRAM

## 2021-12-13 PROCEDURE — 87591 N.GONORRHOEAE DNA AMP PROB: CPT | Performed by: STUDENT IN AN ORGANIZED HEALTH CARE EDUCATION/TRAINING PROGRAM

## 2021-12-13 ASSESSMENT — MIFFLIN-ST. JEOR: SCORE: 2026.14

## 2021-12-13 NOTE — PROGRESS NOTES
Assessment & Plan     Bryan was seen today for results.    Diagnoses and all orders for this visit:    PrEP Candidate  -     emtricitabine-tenofovir AF (DESCOVY) 200-25 MG per tablet; Take 1 tablet by mouth daily  Chronic stable condition. Continues to be PrEP candidate. On daily dosing. Refilled for 3 months.   HPV shot #2 due in a few weeks and will schedule RN visit.     Routine screening for STI (sexually transmitted infection)  -     Rapid Plasma Reagin w Rflx to TITER; Future  -     Chlamydia trachomatis/Neisseria gonorrhoeae by PCR - Clinic Collect; Future  -     Chlamydia trachomatis/Neisseria gonorrhoeae by PCR - Clinic Collect  -     Chlamydia trachomatis/Neisseria gonorrhoeae by PCR - Clinic Collect  -     Rapid Plasma Reagin w Rflx to TITER  -     Chlamydia trachomatis/Neisseria gonorrhoeae by PCR - Clinic Collect  -     Chlamydia trachomatis PCR; Future  -     Neisseria gonorrhoeae PCR; Future  -     Chlamydia trachomatis PCR; Future  -     Neisseria gonorrhoeae PCR; Future  -     Chlamydia trachomatis PCR; Future  -     Neisseria gonorrhoeae PCR; Future  -     Chlamydia trachomatis PCR  -     Neisseria gonorrhoeae PCR  -     Chlamydia trachomatis PCR  -     Neisseria gonorrhoeae PCR  -     Chlamydia trachomatis PCR  -     Neisseria gonorrhoeae PCR  RPR in process. Hx of known exposure, so trep ab deferred and will monitor RPR. 3 site g/c testing resulted negative. Continue q 3month testing, or more often prn.     Screening for HIV (human immunodeficiency virus)  -     HIV Antigen Antibody Combo; Future  -     HIV Antigen Antibody Combo  Negative test.     Abnormal transaminases  -     Hepatic panel; Future  -     Hepatic panel    Elevated AST and ALT in past has now returned to normal range.Chronic stable condition now at goal. Question if from testosterone, other mediation or non hepatitis viral reaction as this had happened with other viral illness over 7 years ago. Will continue to monitor q3-6  "months with other blood draws.      08441- Medication management, 3+ unique tests ordered, and 2+ chronic stable conditions.      BMI:   Estimated body mass index is 32.86 kg/m  as calculated from the following:    Height as of this encounter: 1.82 m (5' 11.65\").    Weight as of this encounter: 108.9 kg (240 lb).   Weight management plan: Discussed healthy diet and exercise guidelines        Return in about 3 months (around 3/13/2022) for PrEP visit .    Jovani Montesinos DO  [facility] ASHA Adams is a 41 year old who presents for the following health issues     HPI        HIV Prevention / PrEP - Follow up  Bryan is here for follow up concerning pre-exposure prophylaxis for HIV.    Ongoing risk factors for acquiring HIV infection:    Patient is member of high prevalence group (MSM, non-monogmous partnership): no/yes: YES    Any anal sex without condoms in the past 6 months: no/yes: YES    Has a current partner that is HIV positive: no/yes: no     ANY Bacterial STI in the last 6 months: no/yes: yes    Last HIV test: negative    HPV shot #2- on 12/18 or after    Traveling for holidays - Neg COVID testing last week.       Review of Systems   DENIES: Denies lymphadenopathy,fevers,chills,rigors,night sweats,weight loss,oral thrush,mouth lesions,dyspnea,cough,diarrhea, edema.  DENIES: Denies  discharge, rash, genital lesions.        Objective    /76   Pulse 70   Temp 98.1  F (36.7  C) (Oral)   Resp 16   Ht 1.82 m (5' 11.65\")   Wt 108.9 kg (240 lb)   SpO2 96%   BMI 32.86 kg/m    Body mass index is 32.86 kg/m .  Physical Exam   General: Alert and oriented, in no acute distress.  Skin: Warm and dry, no abnormalities noted.  Eyes: Extra-ocular muscles grossly intact, pupils equal.  ENT: Speech intact, nasal passages open, no hearing impairment noted. No oral lesions.   CV: No cyanosis or pallor, warm and well perfused.  Respiratory: No respiratory distress, no accessory muscle " use.  Neuro: Gait and station normal, comprehension intact. Gross and fine motor skills intact.   : No anal lesions.  Psychiatric: Mood and affect appear normal.   Extremities: Warm, able to move all four extremities at will.      Results for orders placed or performed in visit on 12/13/21   HIV Antigen Antibody Combo     Status: Normal   Result Value Ref Range    HIV Antigen Antibody Combo Nonreactive Nonreactive   Hepatic panel     Status: Normal   Result Value Ref Range    Bilirubin Total 1.1 0.2 - 1.3 mg/dL    Bilirubin Direct 0.1 0.0 - 0.2 mg/dL    Protein Total 7.9 6.8 - 8.8 g/dL    Albumin 3.9 3.4 - 5.0 g/dL    Alkaline Phosphatase 53 40 - 150 U/L    AST 45 0 - 45 U/L    ALT 70 0 - 70 U/L   Chlamydia trachomatis PCR     Status: Normal    Specimen: Throat; Swab   Result Value Ref Range    Chlamydia trachomatis Negative Negative   Neisseria gonorrhoeae PCR     Status: Normal    Specimen: Urine, Voided   Result Value Ref Range    Neisseria gonorrhoeae Negative Negative   Chlamydia trachomatis PCR     Status: Normal    Specimen: Urine, Voided   Result Value Ref Range    Chlamydia trachomatis Negative Negative   Neisseria gonorrhoeae PCR     Status: Normal    Specimen: Rectum; Swab   Result Value Ref Range    Neisseria gonorrhoeae Negative Negative   Chlamydia trachomatis PCR     Status: Normal    Specimen: Rectum; Swab   Result Value Ref Range    Chlamydia trachomatis Negative Negative   Neisseria gonorrhoeae PCR     Status: Normal    Specimen: Throat; Swab   Result Value Ref Range    Neisseria gonorrhoeae Negative Negative       RPR in process.

## 2021-12-14 ENCOUNTER — THERAPY VISIT (OUTPATIENT)
Dept: OCCUPATIONAL THERAPY | Facility: CLINIC | Age: 41
End: 2021-12-14
Payer: COMMERCIAL

## 2021-12-14 DIAGNOSIS — M67.90 TENDINOPATHY: Primary | ICD-10-CM

## 2021-12-14 DIAGNOSIS — M79.641 PAIN OF RIGHT HAND: ICD-10-CM

## 2021-12-14 LAB
C TRACH DNA SPEC QL NAA+PROBE: NEGATIVE
N GONORRHOEA DNA SPEC QL NAA+PROBE: NEGATIVE

## 2021-12-14 PROCEDURE — 97110 THERAPEUTIC EXERCISES: CPT | Mod: GO | Performed by: OCCUPATIONAL THERAPIST

## 2021-12-14 PROCEDURE — 97035 APP MDLTY 1+ULTRASOUND EA 15: CPT | Mod: GO | Performed by: OCCUPATIONAL THERAPIST

## 2021-12-14 PROCEDURE — 97140 MANUAL THERAPY 1/> REGIONS: CPT | Mod: GO | Performed by: OCCUPATIONAL THERAPIST

## 2021-12-14 NOTE — PROGRESS NOTES
SOAP Note - Hand Therapy - Objective Information    Current Date:  12/14/2021  Referring Physician:Marissa Garcia MD    Diagnosis: Right Wrist and thumb pain  DOI: 10/15/21    Bryan Saalzar is a 41 year old right hand dominant male.    Occupational Profile Information:  Current occupation is     S:  Subjective changes as noted by patient: the wrist and thumb are good  Functional changes noted by patient: Less pain with functional tasks      O:  Pain Level (Scale 0-10)   11/30/2021 12/14/21   At Rest 4/10 0/10   With Use 5/10 1/10     Pain Description  Date 11/30/2021   Location Radial wrist and thumb   Pain Quality Dull   Frequency intermittent     Pain is worst  daytime   Exacerbated by  gripping, weight bearing   Relieved by cold and rest   Progression unchanged     Edema  None    Sensation   WNL throughout all nerve distributions; per patient report    Edema   - none  + mild    ++ moderate    +++ severe   Right 11/30/2021   1st DC -   Radial Styloid -      ROM  Pain Report: - none  + mild    ++ moderate    +++ severe   Thumb   11/30/2021 11/30/2021 12/14/21   AROM  (PROM) Left Right Right   MP  50    IP  75    RABD 55 50    PABD 55 45    Opposition 10/10 9/10+ 9/10     Wrist 11/30/2021 11/30/2021 12/14/21   AROM (PROM) Left Right Right   Extension 70 65    Flexion 55 60    RD 25 15    UD 45 40    UD with Th Flex 25 20+ 25     Resisted Testing  Pain Report: - none  + mild    ++ moderate    +++ severe   Right 11/30/2021   APL +   EPB +   EPL -   FCR +   Radial Deviation +   Ulnar Deviation -         Strength   (Measured in pounds)  Pain Report: - none  + mild    ++ moderate    +++ severe    11/30/2021 11/30/2021 12/14/21   Trials Left Right Right   1  2  3 58  55  42 49  56  54    Average 52 53 46     Lat Pinch 11/30/2021 11/30/2021 12/14/21 12/14/21   Trials Left Right Left Right   1  2  3 15 20 25 24   Average         3 Pt Pinch 11/30/2021 11/30/2021 12/14/21 12/14/21   Trials Left Right  Left Right   1  2  3 8 12 17 17   Average         Special Tests   Pain Report:  - none    + mild    ++ moderate    +++ severe    11/30/2021 12/14/21   Finkelsteins -    Radial Nerve Tinel's (DRSN) -    Th CMC Grind +    Th CMC Passive Retropulsion -    WHAT test + -     Palpation  Pain Report:  - none    + mild    ++ moderate    +++ severe   Right 11/30/2021 12/14/21   Radial Styloid -    1st DC -    FCR +    Thumb CMC +     Thenar (base of thumb) + +   PIN Site -    Extensor Wad -      Please refer to the daily flowsheet for treatment provided today.     Home Program:   Warmth for stiffness  Ice after activity for pain  Thumb spica orthosis (Comfort Cool) for sleeping and per symptoms day  Avoid activities that exacerbate pain in the wrist or thumb  Self MFR to thenar   strengthening    Next Visit:  US  MFR to thenar and radial wrist  AROM of wrist and thumb

## 2021-12-15 LAB
RPR SER QL: REACTIVE
RPR SER-TITR: ABNORMAL {TITER}

## 2021-12-27 ENCOUNTER — ALLIED HEALTH/NURSE VISIT (OUTPATIENT)
Dept: FAMILY MEDICINE | Facility: CLINIC | Age: 41
End: 2021-12-27
Payer: COMMERCIAL

## 2021-12-27 DIAGNOSIS — Z23 ENCOUNTER FOR IMMUNIZATION: Primary | ICD-10-CM

## 2021-12-27 PROCEDURE — 90651 9VHPV VACCINE 2/3 DOSE IM: CPT

## 2021-12-27 PROCEDURE — 99207 PR NO CHARGE NURSE ONLY: CPT

## 2021-12-27 PROCEDURE — 90471 IMMUNIZATION ADMIN: CPT

## 2021-12-30 ENCOUNTER — THERAPY VISIT (OUTPATIENT)
Dept: OCCUPATIONAL THERAPY | Facility: CLINIC | Age: 41
End: 2021-12-30
Payer: COMMERCIAL

## 2021-12-30 DIAGNOSIS — M79.641 PAIN OF RIGHT HAND: ICD-10-CM

## 2021-12-30 DIAGNOSIS — M67.90 TENDINOPATHY: Primary | ICD-10-CM

## 2021-12-30 PROCEDURE — 97035 APP MDLTY 1+ULTRASOUND EA 15: CPT | Mod: GO | Performed by: OCCUPATIONAL THERAPIST

## 2021-12-30 PROCEDURE — 97110 THERAPEUTIC EXERCISES: CPT | Mod: GO | Performed by: OCCUPATIONAL THERAPIST

## 2021-12-30 PROCEDURE — 97140 MANUAL THERAPY 1/> REGIONS: CPT | Mod: GO | Performed by: OCCUPATIONAL THERAPIST

## 2021-12-30 NOTE — PROGRESS NOTES
SOAP Note - Hand Therapy - Objective Information    Current Date:  12/30/2021  Referring Physician:Marissa Garcia MD    Diagnosis: Right Wrist and thumb pain  DOI: 10/15/21    Bryan Salazar is a 41 year old right hand dominant male.    Occupational Profile Information:  Current occupation is     S:  Subjective changes as noted by patient: The hand is doing pretty good. Weaning off the brace (wearing it 40% of time)  Functional changes noted by patient: Less pain with functional tasks    Upper Extremity Functional Index Score:  SCORE:   Column Totals: /80: 75   (A lower score indicates greater disability.)    O:  Pain Level (Scale 0-10)   11/30/2021 12/14/21 12/30/21   At Rest 4/10 0/10 0/10   With Use 5/10 1/10 0/10     Pain Description  Date 11/30/2021   Location Radial wrist and thumb   Pain Quality Dull   Frequency intermittent     Pain is worst  daytime   Exacerbated by  gripping, weight bearing   Relieved by cold and rest   Progression unchanged     ROM  Pain Report: - none  + mild    ++ moderate    +++ severe   Thumb   11/30/2021 11/30/2021 12/14/21   AROM  (PROM) Left Right Right   MP  50    IP  75    RABD 55 50    PABD 55 45    Opposition 10/10 9/10+ 9/10     Wrist 11/30/2021 11/30/2021 12/14/21   AROM (PROM) Left Right Right   Extension 70 65    Flexion 55 60    RD 25 15    UD 45 40    UD with Th Flex 25 20+ 25     Strength   (Measured in pounds)  Pain Report: - none  + mild    ++ moderate    +++ severe    11/30/2021 11/30/2021 12/14/21 12/30/21   Trials Left Right Right right   1  2  3 58  55  42 49  56  54  94  87  89   Average 52 53 46 90     Lat Pinch 11/30/2021 11/30/2021 12/14/21 12/14/21   Trials Left Right Left Right   1  2  3 15 20 25 24   Average         3 Pt Pinch 11/30/2021 11/30/2021 12/14/21 12/14/21   Trials Left Right Left Right   1  2  3 8 12 17 17   Average         Special Tests   Pain Report:  - none    + mild    ++ moderate    +++ severe    11/30/2021 12/14/21    Finkelsteins -    Radial Nerve Tinel's (DRSN) -    Th CMC Grind +    Th CMC Passive Retropulsion -    WHAT test + -     Palpation  Pain Report:  - none    + mild    ++ moderate    +++ severe   Right 11/30/2021 12/14/21 12/30/21   Radial Styloid -     1st DC -     FCR +     Thumb CMC +      Thenar (base of thumb) + + +   PIN Site -     Extensor Wad -       Please refer to the daily flowsheet for treatment provided today.     Home Program:   Warmth for stiffness  Ice after activity for pain  Thumb spica orthosis (Comfort Cool) for sleeping and per symptoms day  Avoid activities that exacerbate pain in the wrist or thumb  Self MFR to thenar   strengthening  Resume weight lifting at a gradual pace    Next Visit:  Check strength  MFR to thenar and radial wrist  AROM of wrist and thumb

## 2022-01-24 ENCOUNTER — THERAPY VISIT (OUTPATIENT)
Dept: OCCUPATIONAL THERAPY | Facility: CLINIC | Age: 42
End: 2022-01-24
Payer: COMMERCIAL

## 2022-01-24 DIAGNOSIS — M79.641 PAIN OF RIGHT HAND: ICD-10-CM

## 2022-01-24 PROCEDURE — 97035 APP MDLTY 1+ULTRASOUND EA 15: CPT | Mod: GO

## 2022-01-24 PROCEDURE — 97110 THERAPEUTIC EXERCISES: CPT | Mod: GO

## 2022-01-24 PROCEDURE — 97140 MANUAL THERAPY 1/> REGIONS: CPT | Mod: GO

## 2022-01-27 ENCOUNTER — MYC MEDICAL ADVICE (OUTPATIENT)
Dept: FAMILY MEDICINE | Facility: CLINIC | Age: 42
End: 2022-01-27
Payer: COMMERCIAL

## 2022-01-27 DIAGNOSIS — H91.90 HEARING LOSS, UNSPECIFIED HEARING LOSS TYPE, UNSPECIFIED LATERALITY: Primary | ICD-10-CM

## 2022-02-01 ENCOUNTER — THERAPY VISIT (OUTPATIENT)
Dept: OCCUPATIONAL THERAPY | Facility: CLINIC | Age: 42
End: 2022-02-01
Payer: COMMERCIAL

## 2022-02-01 DIAGNOSIS — M79.641 PAIN OF RIGHT HAND: ICD-10-CM

## 2022-02-01 PROCEDURE — 97110 THERAPEUTIC EXERCISES: CPT | Mod: GO

## 2022-02-01 NOTE — PROGRESS NOTES
Hand Therapy Discharge Note    Current Date:  2/1/2022    Diagnosis: Right Wrist and thumb pain  DOI: 10/15/21    Reporting period is 11/30/2022 to 2/1/2022    Subjective:   Subjective changes noted by patient:  A little tension when at the gym. But I started my new job, and that's going well and not aggravating things.   Functional changes noted by patient:  Improvement in Work Tasks, Recreational Activities and Household Chores  Patient has noted adverse reaction to:  None    Functional Outcome Measure:  Upper Extremity Functional Index Score:  SCORE:   Column Totals: /80: 80   (A lower score indicates greater disability.)    Objective:  Pain Level (Scale 0-10)   11/30/2021 12/14/21 12/30/21 2/1/2022   At Rest 4/10 0/10 0/10 0/10   With Use 5/10 1/10 0/10 0/10     Pain Description  Date 11/30/2021   Location Radial wrist and thumb   Pain Quality Dull   Frequency intermittent     Pain is worst  daytime   Exacerbated by  gripping, weight bearing   Relieved by cold and rest   Progression unchanged     ROM  Pain Report: - none  + mild    ++ moderate    +++ severe   Thumb   11/30/2021 11/30/2021 12/14/21 2/1/2022   AROM  (PROM) Left Right Right Right   MP  50     IP  75     RABD 55 50     PABD 55 45     Opposition 10/10 9/10+ 9/10 10/10     Wrist 11/30/2021 11/30/2021 12/14/21 2/1/2022   AROM (PROM) Left Right Right Right   Extension 70 65  71   Flexion 55 60  63   RD 25 15  20   UD 45 40  40   UD with Th Flex 25 20+ 25 25     Strength   (Measured in pounds)  Pain Report: - none  + mild    ++ moderate    +++ severe    11/30/2021 11/30/2021 12/14/21 12/30/21   Trials Left Right Right right   1  2  3 58  55  42 49  56  54  94  87  89   Average 52 53 46 90     Lat Pinch 11/30/2021 11/30/2021 12/14/21 12/14/21   Trials Left Right Left Right   1  2  3 15 20 25 24   Average         3 Pt Pinch 11/30/2021 11/30/2021 12/14/21 12/14/21   Trials Left Right Left Right   1  2  3 8 12 17 17   Average         Special Tests   Pain  "Report:  - none    + mild    ++ moderate    +++ severe    11/30/2021 12/14/21   Finkelsteins -    Radial Nerve Tinel's (DRSN) -    Th CMC Grind +    Th CMC Passive Retropulsion -    WHAT test + -     Palpation  Pain Report:  - none    + mild    ++ moderate    +++ severe   Right 11/30/2021 12/14/21 12/30/21 2/1/2021   Radial Styloid -   NT   1st DC -   NT   FCR +   NT   Thumb CMC +   NT    Thenar (base of thumb) + + + 0/10   PIN Site -   NT   Extensor Wad -   NT     Please refer to the daily flowsheet for treatment provided today.     Assessment:  Response to therapy has been improvement to:  ROM of Wrist:  Ext , Flex and Radial Deviation  Pain:  frequency is less    Overall Assessment:  Patient's symptoms are resolving.  STG/LTG:  LTGoals have been reviewed and progress or achievement has occurred:  see goal sheet for details and updates.    Plan:  Patient reports they are ready to discharge from [facility].  They will continue with their HEP independently.      Home Exercise Program:  Ball Massage  Wrist Active Range of Motion Extension and Flexion Combined  HEP - Sets 3x/day  Reps 10  Wrist Active Range of Motion Radial and Ulnar Deviation for deQuervains  HEP - Sets 3x/day  Reps 10  Notes begin without thumb \"parked\" if too painful  Thumb Active Range of Motion Composite Flexion  HEP - Sets 3x/day  Reps 10  Sessions per day  Thumb Active Range of Motion Palmar Abduction  HEP - Sets 3x/day  Reps 10  Thumb Active Range of Motion Opposition  HEP - Sets 3x/day  Reps 10  Notes touch only small finger and then extend thumb  Wrist Passive Range of Motion DeQuervain's Stretch  HEP - Sets 3x/day  Reps 5  Notes gentle stretch, hold 10 seconds  Thumb Strengthening for deQuervains Phase 1  HEP - Sets 3  Reps 10  Thumb Strengthening for deQuervains Phase 2  Reps 10  Sessions per day 3  Thumb Strengthening for deQuervains Phase 3  Reps 10  Sessions per day 3  Hand Strengthening Gripping  Reps 10  Sessions " per day 2-3  Wrist Stabilization Door Frame Pull Backs  Reps working up to 100 reps  Sessions per day 1-2  Wrist Stabilization Wall Push Ups on Fingertips  Reps working up to 100 reps  Sessions per day 1-2

## 2022-02-01 NOTE — LETTER
[facility]  [address]  [place]   [facility] [address] [phone]    2022    Re: Bryan Salazar   :   1980  MRN:  [id number]   REFERRING PHYSICIAN:   Marissa KNOTT [facility]  Date of Initial Evaluation:    Visits:  Rxs Used: 6  Reason for Referral:  Pain of right hand    Hand Therapy Discharge Note  Current Date:  2022  Diagnosis: Right Wrist and thumb pain  DOI: 10/15/21    Reporting period is 2022 to 2022    Subjective:   Subjective changes noted by patient:  A little tension when at the gym. But I started my new job, and that's going well and not aggravating things.   Functional changes noted by patient:  Improvement in Work Tasks, Recreational Activities and Household Chores  Patient has noted adverse reaction to:  None    Functional Outcome Measure:  Upper Extremity Functional Index Score:  SCORE:   Column Totals: /80: 80   (A lower score indicates greater disability.)    Objective:  Pain Level (Scale 0-10)   2021   At Rest 4/10 010 0/10 0/10   With Use 5/10 1/10 0/10 0/10     Pain Description  Date 2021   Location Radial wrist and thumb   Pain Quality Dull   Frequency intermittent     Pain is worst  daytime   Exacerbated by  gripping, weight bearing   Relieved by cold and rest   Progression unchanged   ROM  Pain Report: - none  + mild    ++ moderate    +++ severe   Thumb   2021   AROM  (PROM) Left Right Right Right   MP  50     IP  75     RABD 55 50     PABD 55 45     Opposition 10/10 9/10+ 9/10 10/10     Wrist 2021   AROM (PROM) Left Right Right Right   Extension 70 65  71   Flexion 55 60  63   RD 25 15  20   UD 45 40  40   UD with Th Flex 25 20+ 25 25     Strength   (Measured in pounds)  Pain Report: - none  + mild    ++ moderate    +++ severe     "11/30/2021 11/30/2021 12/14/21 12/30/21   Trials Left Right Right right   1  2  3 58  55  42 49  56  54  94  87  89   Average 52 53 46 90     Lat Pinch 11/30/2021 11/30/2021 12/14/21 12/14/21   Trials Left Right Left Right   1  2  3 15 20 25 24   Average         3 Pt Pinch 11/30/2021 11/30/2021 12/14/21 12/14/21   Trials Left Right Left Right   1  2  3 8 12 17 17   Average         Special Tests   Pain Report:  - none    + mild    ++ moderate    +++ severe    11/30/2021 12/14/21   Finkelsteins -    Radial Nerve Tinel's (DRSN) -    Th CMC Grind +    Th CMC Passive Retropulsion -    WHAT test + -     Palpation  Pain Report:  - none    + mild    ++ moderate    +++ severe   Right 11/30/2021 12/14/21 12/30/21 2/1/2021   Radial Styloid -   NT   1st DC -   NT   FCR +   NT   Thumb CMC +   NT    Thenar (base of thumb) + + + 0/10   PIN Site -   NT   Extensor Wad -   NT     Please refer to the daily flowsheet for treatment provided today.     Assessment:  Response to therapy has been improvement to:  ROM of Wrist:  Ext , Flex and Radial Deviation  Pain:  frequency is less    Overall Assessment:  Patient's symptoms are resolving.  STG/LTG:  LTGoals have been reviewed and progress or achievement has occurred:  see goal sheet for details and updates.    Plan:  Patient reports they are ready to discharge from [facility].  They will continue with their HEP independently.      Home Exercise Program:  Ball Massage  Wrist Active Range of Motion Extension and Flexion Combined  HEP - Sets 3x/day  Reps 10  Wrist Active Range of Motion Radial and Ulnar Deviation for deQuervains  HEP - Sets 3x/day  Reps 10  Notes begin without thumb \"parked\" if too painful  Thumb Active Range of Motion Composite Flexion  HEP - Sets 3x/day  Reps 10  Sessions per day  Thumb Active Range of Motion Palmar Abduction  HEP - Sets 3x/day  Reps 10  Thumb Active Range of Motion Opposition  HEP - Sets 3x/day  Reps 10  Notes touch only small finger " and then extend thumb      Wrist Passive Range of Motion DeQuervain's Stretch  HEP - Sets 3x/day  Reps 5  Notes gentle stretch, hold 10 seconds  Thumb Strengthening for deQuervains Phase 1  HEP - Sets 3  Reps 10  Thumb Strengthening for deQuervains Phase 2  Reps 10  Sessions per day 3  Thumb Strengthening for deQuervains Phase 3  Reps 10  Sessions per day 3  Hand Strengthening Gripping  Reps 10  Sessions per day 2-3  Wrist Stabilization Door Frame Pull Backs  Reps working up to 100 reps  Sessions per day 1-2  Wrist Stabilization Wall Push Ups on Fingertips  Reps working up to 100 reps  Sessions per day 1-2    Thank you for your referral.    INQUIRIES  Therapist: An[facility]  [address]  [facility] [address]  [facility] [address]  Phone: [phone]  Fax: [phone]

## 2022-02-24 ENCOUNTER — OFFICE VISIT (OUTPATIENT)
Dept: FAMILY MEDICINE | Facility: CLINIC | Age: 42
End: 2022-02-24
Payer: COMMERCIAL

## 2022-02-24 VITALS
TEMPERATURE: 98.5 F | WEIGHT: 240 LBS | DIASTOLIC BLOOD PRESSURE: 78 MMHG | RESPIRATION RATE: 16 BRPM | BODY MASS INDEX: 32.51 KG/M2 | HEIGHT: 72 IN | SYSTOLIC BLOOD PRESSURE: 124 MMHG | HEART RATE: 84 BPM | OXYGEN SATURATION: 95 %

## 2022-02-24 DIAGNOSIS — Z11.4 SCREENING FOR HIV (HUMAN IMMUNODEFICIENCY VIRUS): ICD-10-CM

## 2022-02-24 DIAGNOSIS — K21.9 GASTROESOPHAGEAL REFLUX DISEASE, UNSPECIFIED WHETHER ESOPHAGITIS PRESENT: Primary | ICD-10-CM

## 2022-02-24 DIAGNOSIS — Z20.6 CONTACT WITH AND (SUSPECTED) EXPOSURE TO HUMAN IMMUNODEFICIENCY VIRUS (HIV): ICD-10-CM

## 2022-02-24 DIAGNOSIS — Z86.19 HISTORY OF LATENT SYPHILIS: ICD-10-CM

## 2022-02-24 DIAGNOSIS — Z11.3 ROUTINE SCREENING FOR STI (SEXUALLY TRANSMITTED INFECTION): ICD-10-CM

## 2022-02-24 DIAGNOSIS — R41.840 INATTENTION: ICD-10-CM

## 2022-02-24 DIAGNOSIS — K64.8 INTERNAL HEMORRHOID: ICD-10-CM

## 2022-02-24 PROCEDURE — 86592 SYPHILIS TEST NON-TREP QUAL: CPT | Performed by: STUDENT IN AN ORGANIZED HEALTH CARE EDUCATION/TRAINING PROGRAM

## 2022-02-24 PROCEDURE — 87491 CHLMYD TRACH DNA AMP PROBE: CPT | Performed by: STUDENT IN AN ORGANIZED HEALTH CARE EDUCATION/TRAINING PROGRAM

## 2022-02-24 PROCEDURE — 87591 N.GONORRHOEAE DNA AMP PROB: CPT | Performed by: STUDENT IN AN ORGANIZED HEALTH CARE EDUCATION/TRAINING PROGRAM

## 2022-02-24 PROCEDURE — 87389 HIV-1 AG W/HIV-1&-2 AB AG IA: CPT | Performed by: STUDENT IN AN ORGANIZED HEALTH CARE EDUCATION/TRAINING PROGRAM

## 2022-02-24 PROCEDURE — 86593 SYPHILIS TEST NON-TREP QUANT: CPT | Performed by: STUDENT IN AN ORGANIZED HEALTH CARE EDUCATION/TRAINING PROGRAM

## 2022-02-24 PROCEDURE — 36415 COLL VENOUS BLD VENIPUNCTURE: CPT | Performed by: STUDENT IN AN ORGANIZED HEALTH CARE EDUCATION/TRAINING PROGRAM

## 2022-02-24 PROCEDURE — 99214 OFFICE O/P EST MOD 30 MIN: CPT | Performed by: STUDENT IN AN ORGANIZED HEALTH CARE EDUCATION/TRAINING PROGRAM

## 2022-02-24 RX ORDER — HYDROCORTISONE 25 MG/G
CREAM TOPICAL 2 TIMES DAILY PRN
Qty: 30 G | Refills: 1 | Status: SHIPPED | OUTPATIENT
Start: 2022-02-24 | End: 2022-11-28

## 2022-02-24 RX ORDER — FAMOTIDINE 20 MG/1
20 TABLET, FILM COATED ORAL 2 TIMES DAILY
Qty: 180 TABLET | Refills: 1 | Status: SHIPPED | OUTPATIENT
Start: 2022-02-24 | End: 2022-05-31

## 2022-02-24 NOTE — PROGRESS NOTES
Assessment & Plan     Bryan was seen today for recheck, referral and rectal problem.    Diagnoses and all orders for this visit:    Gastroesophageal reflux disease, unspecified whether esophagitis present  -     famotidine (PEPCID) 20 MG tablet; Take 1 tablet (20 mg) by mouth 2 times daily  Uncontrolled GERD symptoms. Has tried over-the-counter omeprazole with some benefit. Interested in daily preventative medication. Will start famotidine.    Inattention  -     Adult Mental Health  Referral; Future  Patient with ongoing inattention over her lifetime. Has never had ADHD diagnosis. Interested in adult testing. Discussed that this may not be covered by insurance. Met with behavioral health integrated clinic provider. Resources provided.    Routine screening for STI (sexually transmitted infection)  History of latent syphilis  -     Rapid Plasma Reagin w Rflx to TITER; Future  -     Chlamydia trachomatis/Neisseria gonorrhoeae by PCR - Clinic Collect; Future  -     Chlamydia trachomatis/Neisseria gonorrhoeae by PCR - Clinic Collect  -     Chlamydia trachomatis/Neisseria gonorrhoeae by PCR - Clinic Collect  -     Rapid Plasma Reagin w Rflx to TITER  -     Chlamydia trachomatis/Neisseria gonorrhoeae by PCR - Clinic Collect    PrEP Candidate  -     Creatinine; Future  -     emtricitabine-tenofovir AF (DESCOVY) 200-25 MG per tablet; Take 1 tablet by mouth daily  Screening for HIV (human immunodeficiency virus)  -     HIV Antigen Antibody Combo; Future  -     HIV Antigen Antibody Combo    Patient continues to be candidate for HIV prevention with preexposure prophylaxis. HIV test ordered today. Also due for other infectious screening including RPR, he has had a history of syphilis exposure to treponema intentionally not ordered, 3 site gonorrhea chlamydia testing. Also due for creatinine check as it has been 6 months since prior creatinine.    Internal hemorrhoid  -     hydrocortisone, Perianal, (HYDROCORTISONE) 2.5  "% cream; Place rectally 2 times daily as needed for hemorrhoids  Patient also noting rectal bleeding especially with wiping. Most often uses a bidet but does not cause concerns. Rectal exam today not showing external or suspicious lesions but does have potential internal hemorrhoid. Will try rectal hydrocortisone cream. Discussed possibility of higher bleed as well as higher GI tract bleeding sources that may require GI evaluation or colonoscopy. Patient interested in trying cream at this time and will follow up with me in 3 months.    Ordering of each unique test  Prescription drug management        Return in about 3 months (around 5/24/2022).    DO HEDY Grullon [facility] ASHA Adams is a 41 year old who presents for the following health issues     HPI     Hearing loss- audiology referral is in process    Inattention-  Trouble focusing. Mind jumping from one thing to another. Never had a diagnosis in the past during childhood. Colleagues at work and friends at home think he should be assessed.      HIV Prevention / PrEP - Follow up  Bryan is here for follow up concerning pre-exposure prophylaxis for HIV.    Ongoing risk factors for acquiring HIV infection:    Patient is member of high prevalence group (MSM, non-monogmous partnership): no/yes: YES    Any anal sex without condoms in the past 6 months: no/yes: YES    Has a current partner that is HIV positive: no/yes: no     ANY Bacterial STI in the last 6 months: no/yes: no     Review of Systems   DENIES: Denies lymphadenopathy,fevers,chills,rigors,night sweats,weight loss,oral thrush,mouth lesions,dyspnea,cough,diarrhea, edema.  DENIES: Denies  discharge, rash, genital lesions.  Reports blood per rectum.      Objective    /78   Pulse 84   Temp 98.5  F (36.9  C) (Oral)   Resp 16   Ht 1.82 m (5' 11.65\")   Wt 108.9 kg (240 lb)   SpO2 95%   BMI 32.86 kg/m    Body mass index is 32.86 kg/m .  Physical Exam  Constitutional:  "      General: He is not in acute distress.     Appearance: He is well-developed.   HENT:      Head: Normocephalic and atraumatic.   Eyes:      General: No scleral icterus.     Extraocular Movements: Extraocular movements intact.   Cardiovascular:      Rate and Rhythm: Normal rate.      Heart sounds: Normal heart sounds.   Pulmonary:      Effort: Pulmonary effort is normal. No respiratory distress.   Genitourinary:     Prostate: Normal.      Rectum: Internal hemorrhoid present. No tenderness, anal fissure or external hemorrhoid. Normal anal tone.   Neurological:      General: No focal deficit present.      Mental Status: He is alert and oriented to person, place, and time.   Psychiatric:         Thought Content: Thought content normal.

## 2022-02-25 LAB
C TRACH DNA SPEC QL PROBE+SIG AMP: NEGATIVE
HIV 1+2 AB+HIV1 P24 AG SERPL QL IA: NONREACTIVE
N GONORRHOEA DNA SPEC QL NAA+PROBE: NEGATIVE
RPR SER QL: REACTIVE
RPR SER-TITR: NORMAL {TITER}

## 2022-03-16 ENCOUNTER — OFFICE VISIT (OUTPATIENT)
Dept: AUDIOLOGY | Facility: CLINIC | Age: 42
End: 2022-03-16
Payer: COMMERCIAL

## 2022-03-16 DIAGNOSIS — H93.293 ABNORMAL AUDITORY PERCEPTION OF BOTH EARS: Primary | ICD-10-CM

## 2022-03-16 PROCEDURE — 92557 COMPREHENSIVE HEARING TEST: CPT | Performed by: AUDIOLOGIST

## 2022-03-16 PROCEDURE — 92567 TYMPANOMETRY: CPT | Performed by: AUDIOLOGIST

## 2022-03-16 PROCEDURE — 99207 PR NO CHARGE LOS: CPT | Performed by: AUDIOLOGIST

## 2022-03-16 NOTE — PROGRESS NOTES
AUDIOLOGY REPORT    SUBJECTIVE:  Bryan Salazar is a 42 year old male who was seen in the Audiology Clinic at the [facility] for audiologic evaluation, referred by Vivian Andrews M.D. The patient suspects a gradual hearing loss bilaterally. The patient reports a history of noise exposure. The patient denies otalgia, aural fullness, otorrhea, tinnitus, and dizziness.      OBJECTIVE:  Abuse Screening:  Do you feel unsafe at home or work/school? No  Do you feel threatened by someone? No  Does anyone try to keep you from having contact with others, or doing things outside of your home? No  Physical signs of abuse present? No     Fall Risk Screen:  1. Have you fallen two or more times in the past year? No  2. Have you fallen and had an injury in the past year? No    Timed Up and Go Score (in seconds): not tested  Is patient a fall risk? No  Referral initiated: No  Fall Risk Assessment Completed by Audiology    Otoscopic exam indicates ears are clear of cerumen bilaterally     Pure Tone Thresholds assessed using conventional audiometry with good  reliability from 250-8000 Hz bilaterally using insert earphones     RIGHT:  normal hearing    LEFT:    normal hearing    Tympanogram:    RIGHT: hypercompliant    LEFT:   normal eardrum mobility    Reflexes (reported by stimulus ear):  Could not seal      Speech Reception Threshold:    RIGHT: 15 dB HL    LEFT:   10 dB HL  Word Recognition Score:     RIGHT: 100% at 55 dB HL using NU-6 recorded word list.    LEFT:   100% at 55 dB HL using NU-6 recorded word list.      ASSESSMENT:     ICD-10-CM    1. Abnormal auditory perception of both ears  H93.293 Cmprhn Audiometry Thrshld Eval & Speech Recog (14257)     Tympanometry (impedance - testing) (37210)     Today s results were discussed with the patient in detail.     PLAN:  It is recommended that the patient follow-up if new concerns arise.  Please call this clinic with questions regarding these results or  recommendations.        Donald Sabillon.  Doctor of Audiology  [place] License # [id number]

## 2022-03-18 DIAGNOSIS — S39.012A STRAIN OF LUMBAR REGION, INITIAL ENCOUNTER: ICD-10-CM

## 2022-03-18 RX ORDER — CYCLOBENZAPRINE HCL 5 MG
5-10 TABLET ORAL 3 TIMES DAILY PRN
Qty: 90 TABLET | Refills: 3 | Status: SHIPPED | OUTPATIENT
Start: 2022-03-18 | End: 2023-04-21

## 2022-03-18 NOTE — TELEPHONE ENCOUNTER
"Patient's pharmacy requesting refill of cyclobenzaprine 5 mg tablet. Last office visit 2/24/22 with Dr. Montesinos. Routing to PCP to send refill if appropriate.   Bette Murillo RN      Request for medication refill:    Providers if patient needs an appointment and you are willing to give a one month supply please refill for one month and  send a letter/MyChart using \".SMILLIMITEDREFILL\" .smillimited and route chart to \"P SMI \" (Giving one month refill in non controlled medications is strongly recommended before denial)    If refill has been denied, meaning absolutely no refills without visit, please complete the smart phrase \".smirxrefuse\" and route it to the \"P SMI MED REFILLS\"  pool to inform the patient and the pharmacy.        "

## 2022-04-08 DIAGNOSIS — G47.00 INSOMNIA, UNSPECIFIED TYPE: ICD-10-CM

## 2022-04-08 DIAGNOSIS — N52.9 ERECTILE DYSFUNCTION, UNSPECIFIED ERECTILE DYSFUNCTION TYPE: ICD-10-CM

## 2022-04-08 DIAGNOSIS — I10 ESSENTIAL HYPERTENSION: ICD-10-CM

## 2022-04-08 RX ORDER — TRAZODONE HYDROCHLORIDE 50 MG/1
50 TABLET, FILM COATED ORAL AT BEDTIME
Qty: 90 TABLET | Refills: 3 | Status: SHIPPED | OUTPATIENT
Start: 2022-04-08 | End: 2022-04-13

## 2022-04-08 RX ORDER — LISINOPRIL 5 MG/1
5 TABLET ORAL DAILY
Qty: 90 TABLET | Refills: 3 | Status: SHIPPED | OUTPATIENT
Start: 2022-04-08 | End: 2022-04-13

## 2022-04-08 RX ORDER — TADALAFIL 5 MG/1
5 TABLET ORAL EVERY 24 HOURS
Qty: 30 TABLET | Refills: 11 | Status: SHIPPED | OUTPATIENT
Start: 2022-04-08 | End: 2022-04-13

## 2022-04-08 NOTE — TELEPHONE ENCOUNTER
"Request for medication refill:  tadalafil (CIALIS) 5 MG tablet  lisinopril (ZESTRIL) 5 MG tablet  traZODone (DESYREL) 50 MG tablet  Providers if patient needs an appointment and you are willing to give a one month supply please refill for one month and  send a letter/MyChart using \".SMILLIMITEDREFILL\" .smillimited and route chart to \"P [facility] \" (Giving one month refill in non controlled medications is strongly recommended before denial)    If refill has been denied, meaning absolutely no refills without visit, please complete the smart phrase \".smirxrefuse\" and route it to the \"P SMI MED REFILLS\"  pool to inform the patient and the pharmacy.    Roxie Lopez        "

## 2022-04-11 DIAGNOSIS — K21.9 GASTROESOPHAGEAL REFLUX DISEASE, UNSPECIFIED WHETHER ESOPHAGITIS PRESENT: ICD-10-CM

## 2022-04-13 RX ORDER — TRAZODONE HYDROCHLORIDE 50 MG/1
50 TABLET, FILM COATED ORAL AT BEDTIME
Qty: 90 TABLET | Refills: 3 | Status: SHIPPED | OUTPATIENT
Start: 2022-04-13 | End: 2022-07-01

## 2022-04-13 RX ORDER — TADALAFIL 5 MG/1
5 TABLET ORAL EVERY 24 HOURS
Qty: 30 TABLET | Refills: 11 | Status: SHIPPED | OUTPATIENT
Start: 2022-04-13 | End: 2022-05-31

## 2022-04-13 RX ORDER — LISINOPRIL 5 MG/1
5 TABLET ORAL DAILY
Qty: 90 TABLET | Refills: 3 | Status: SHIPPED | OUTPATIENT
Start: 2022-04-13 | End: 2023-04-21

## 2022-04-13 NOTE — TELEPHONE ENCOUNTER
See mychart encounter from 4/7. Reordered meds.  Appreciate RN support.     Jovani Montesinos, DO

## 2022-05-23 ENCOUNTER — TRANSFERRED RECORDS (OUTPATIENT)
Dept: HEALTH INFORMATION MANAGEMENT | Facility: CLINIC | Age: 42
End: 2022-05-23
Payer: COMMERCIAL

## 2022-05-26 PROBLEM — F90.2 ADHD (ATTENTION DEFICIT HYPERACTIVITY DISORDER), COMBINED TYPE: Status: ACTIVE | Noted: 2022-05-26

## 2022-05-27 ENCOUNTER — MYC MEDICAL ADVICE (OUTPATIENT)
Dept: FAMILY MEDICINE | Facility: CLINIC | Age: 42
End: 2022-05-27
Payer: COMMERCIAL

## 2022-05-28 ENCOUNTER — LAB (OUTPATIENT)
Dept: URGENT CARE | Facility: URGENT CARE | Age: 42
End: 2022-05-28
Attending: FAMILY MEDICINE
Payer: COMMERCIAL

## 2022-05-28 DIAGNOSIS — Z20.822 SUSPECTED 2019 NOVEL CORONAVIRUS INFECTION: ICD-10-CM

## 2022-05-28 PROCEDURE — U0003 INFECTIOUS AGENT DETECTION BY NUCLEIC ACID (DNA OR RNA); SEVERE ACUTE RESPIRATORY SYNDROME CORONAVIRUS 2 (SARS-COV-2) (CORONAVIRUS DISEASE [COVID-19]), AMPLIFIED PROBE TECHNIQUE, MAKING USE OF HIGH THROUGHPUT TECHNOLOGIES AS DESCRIBED BY CMS-2020-01-R: HCPCS

## 2022-05-28 PROCEDURE — U0005 INFEC AGEN DETEC AMPLI PROBE: HCPCS

## 2022-05-29 LAB — SARS-COV-2 RNA RESP QL NAA+PROBE: POSITIVE

## 2022-05-31 ENCOUNTER — VIRTUAL VISIT (OUTPATIENT)
Dept: FAMILY MEDICINE | Facility: CLINIC | Age: 42
End: 2022-05-31
Payer: COMMERCIAL

## 2022-05-31 DIAGNOSIS — Z71.89 ADVICE GIVEN ABOUT COVID-19 VIRUS INFECTION: ICD-10-CM

## 2022-05-31 DIAGNOSIS — K21.9 GASTROESOPHAGEAL REFLUX DISEASE, UNSPECIFIED WHETHER ESOPHAGITIS PRESENT: ICD-10-CM

## 2022-05-31 DIAGNOSIS — L65.9 HAIR LOSS: ICD-10-CM

## 2022-05-31 DIAGNOSIS — N52.9 ERECTILE DYSFUNCTION, UNSPECIFIED ERECTILE DYSFUNCTION TYPE: ICD-10-CM

## 2022-05-31 DIAGNOSIS — G47.33 OSA (OBSTRUCTIVE SLEEP APNEA): ICD-10-CM

## 2022-05-31 DIAGNOSIS — U07.1 INFECTION DUE TO 2019 NOVEL CORONAVIRUS: ICD-10-CM

## 2022-05-31 DIAGNOSIS — F90.2 ADHD (ATTENTION DEFICIT HYPERACTIVITY DISORDER), COMBINED TYPE: Primary | ICD-10-CM

## 2022-05-31 PROCEDURE — 99214 OFFICE O/P EST MOD 30 MIN: CPT | Mod: GT | Performed by: STUDENT IN AN ORGANIZED HEALTH CARE EDUCATION/TRAINING PROGRAM

## 2022-05-31 RX ORDER — FINASTERIDE 1 MG/1
1 TABLET, FILM COATED ORAL DAILY
COMMUNITY
Start: 2022-05-28 | End: 2022-10-20

## 2022-05-31 RX ORDER — DEXTROAMPHETAMINE SACCHARATE, AMPHETAMINE ASPARTATE MONOHYDRATE, DEXTROAMPHETAMINE SULFATE AND AMPHETAMINE SULFATE 2.5; 2.5; 2.5; 2.5 MG/1; MG/1; MG/1; MG/1
10 CAPSULE, EXTENDED RELEASE ORAL DAILY
Qty: 30 CAPSULE | Refills: 0 | Status: SHIPPED | OUTPATIENT
Start: 2022-05-31 | End: 2022-06-30

## 2022-05-31 RX ORDER — FAMOTIDINE 40 MG/1
40 TABLET, FILM COATED ORAL 2 TIMES DAILY
Qty: 180 TABLET | Refills: 1 | Status: SHIPPED | OUTPATIENT
Start: 2022-05-31 | End: 2022-12-15

## 2022-05-31 RX ORDER — DEXTROAMPHETAMINE SACCHARATE, AMPHETAMINE ASPARTATE MONOHYDRATE, DEXTROAMPHETAMINE SULFATE AND AMPHETAMINE SULFATE 2.5; 2.5; 2.5; 2.5 MG/1; MG/1; MG/1; MG/1
10 CAPSULE, EXTENDED RELEASE ORAL DAILY
Qty: 30 CAPSULE | Refills: 0 | Status: SHIPPED | OUTPATIENT
Start: 2022-07-01 | End: 2022-07-31

## 2022-05-31 RX ORDER — TADALAFIL 5 MG/1
5 TABLET ORAL EVERY 24 HOURS
Qty: 90 TABLET | Refills: 3 | Status: SHIPPED | OUTPATIENT
Start: 2022-05-31 | End: 2022-06-15

## 2022-06-07 ENCOUNTER — MYC MEDICAL ADVICE (OUTPATIENT)
Dept: FAMILY MEDICINE | Facility: CLINIC | Age: 42
End: 2022-06-07
Payer: COMMERCIAL

## 2022-06-07 DIAGNOSIS — E29.1 HYPOGONADISM MALE: ICD-10-CM

## 2022-06-07 DIAGNOSIS — R74.01 ELEVATED LIVER TRANSAMINASE LEVEL: ICD-10-CM

## 2022-06-07 DIAGNOSIS — Z11.4 SCREENING FOR HIV (HUMAN IMMUNODEFICIENCY VIRUS): ICD-10-CM

## 2022-06-07 DIAGNOSIS — Z20.6 CONTACT WITH AND (SUSPECTED) EXPOSURE TO HUMAN IMMUNODEFICIENCY VIRUS (HIV): ICD-10-CM

## 2022-06-07 DIAGNOSIS — I10 ESSENTIAL HYPERTENSION: ICD-10-CM

## 2022-06-07 DIAGNOSIS — Z11.3 ROUTINE SCREENING FOR STI (SEXUALLY TRANSMITTED INFECTION): Primary | ICD-10-CM

## 2022-06-07 DIAGNOSIS — Z86.19 HISTORY OF LATENT SYPHILIS: ICD-10-CM

## 2022-06-08 RX ORDER — EMTRICITABINE AND TENOFOVIR DISOPROXIL FUMARATE 200; 300 MG/1; MG/1
1 TABLET, FILM COATED ORAL DAILY
Qty: 30 TABLET | Refills: 2 | Status: SHIPPED | OUTPATIENT
Start: 2022-06-08 | End: 2022-08-19

## 2022-06-24 ENCOUNTER — LAB (OUTPATIENT)
Dept: LAB | Facility: CLINIC | Age: 42
End: 2022-06-24
Payer: COMMERCIAL

## 2022-06-24 DIAGNOSIS — I10 ESSENTIAL HYPERTENSION: ICD-10-CM

## 2022-06-24 DIAGNOSIS — Z11.3 ROUTINE SCREENING FOR STI (SEXUALLY TRANSMITTED INFECTION): ICD-10-CM

## 2022-06-24 DIAGNOSIS — Z11.4 SCREENING FOR HIV (HUMAN IMMUNODEFICIENCY VIRUS): ICD-10-CM

## 2022-06-24 DIAGNOSIS — E29.1 HYPOGONADISM MALE: ICD-10-CM

## 2022-06-24 LAB
ALBUMIN SERPL-MCNC: 3.6 G/DL (ref 3.4–5)
ALP SERPL-CCNC: 44 U/L (ref 40–150)
ALT SERPL W P-5'-P-CCNC: 80 U/L (ref 0–70)
ANION GAP SERPL CALCULATED.3IONS-SCNC: 7 MMOL/L (ref 3–14)
AST SERPL W P-5'-P-CCNC: 56 U/L (ref 0–45)
BILIRUB SERPL-MCNC: 0.3 MG/DL (ref 0.2–1.3)
BUN SERPL-MCNC: 22 MG/DL (ref 7–30)
CALCIUM SERPL-MCNC: 8.7 MG/DL (ref 8.5–10.1)
CHLORIDE BLD-SCNC: 103 MMOL/L (ref 94–109)
CO2 SERPL-SCNC: 28 MMOL/L (ref 20–32)
CREAT SERPL-MCNC: 0.92 MG/DL (ref 0.66–1.25)
GFR SERPL CREATININE-BSD FRML MDRD: >90 ML/MIN/1.73M2
GLUCOSE BLD-MCNC: 89 MG/DL (ref 70–99)
POTASSIUM BLD-SCNC: 3.9 MMOL/L (ref 3.4–5.3)
PROT SERPL-MCNC: 7.5 G/DL (ref 6.8–8.8)
SODIUM SERPL-SCNC: 138 MMOL/L (ref 133–144)

## 2022-06-24 PROCEDURE — 87491 CHLMYD TRACH DNA AMP PROBE: CPT

## 2022-06-24 PROCEDURE — 84403 ASSAY OF TOTAL TESTOSTERONE: CPT

## 2022-06-24 PROCEDURE — 87591 N.GONORRHOEAE DNA AMP PROB: CPT

## 2022-06-24 PROCEDURE — 86593 SYPHILIS TEST NON-TREP QUANT: CPT

## 2022-06-24 PROCEDURE — 36415 COLL VENOUS BLD VENIPUNCTURE: CPT

## 2022-06-24 PROCEDURE — 80053 COMPREHEN METABOLIC PANEL: CPT

## 2022-06-24 PROCEDURE — 87389 HIV-1 AG W/HIV-1&-2 AB AG IA: CPT

## 2022-06-24 PROCEDURE — 86592 SYPHILIS TEST NON-TREP QUAL: CPT

## 2022-06-25 LAB
C TRACH DNA SPEC QL NAA+PROBE: NEGATIVE
HIV 1+2 AB+HIV1 P24 AG SERPL QL IA: NONREACTIVE
N GONORRHOEA DNA SPEC QL NAA+PROBE: NEGATIVE

## 2022-06-27 LAB
RPR SER QL: REACTIVE
RPR SER-TITR: ABNORMAL {TITER}

## 2022-06-28 LAB — TESTOST SERPL-MCNC: 1074 NG/DL (ref 240–950)

## 2022-07-01 ENCOUNTER — OFFICE VISIT (OUTPATIENT)
Dept: FAMILY MEDICINE | Facility: CLINIC | Age: 42
End: 2022-07-01
Payer: COMMERCIAL

## 2022-07-01 VITALS
TEMPERATURE: 98.2 F | RESPIRATION RATE: 16 BRPM | SYSTOLIC BLOOD PRESSURE: 118 MMHG | DIASTOLIC BLOOD PRESSURE: 74 MMHG | HEIGHT: 72 IN | BODY MASS INDEX: 32.51 KG/M2 | HEART RATE: 89 BPM | OXYGEN SATURATION: 100 % | WEIGHT: 240 LBS

## 2022-07-01 DIAGNOSIS — Z79.890 ON HORMONE REPLACEMENT THERAPY: ICD-10-CM

## 2022-07-01 DIAGNOSIS — G47.00 INSOMNIA, UNSPECIFIED TYPE: ICD-10-CM

## 2022-07-01 DIAGNOSIS — F90.2 ADHD (ATTENTION DEFICIT HYPERACTIVITY DISORDER), COMBINED TYPE: ICD-10-CM

## 2022-07-01 DIAGNOSIS — J01.00 ACUTE NON-RECURRENT MAXILLARY SINUSITIS: Primary | ICD-10-CM

## 2022-07-01 LAB
BASOPHILS # BLD AUTO: 0.1 10E3/UL (ref 0–0.2)
BASOPHILS NFR BLD AUTO: 1 %
EOSINOPHIL # BLD AUTO: 0.2 10E3/UL (ref 0–0.7)
EOSINOPHIL NFR BLD AUTO: 2 %
ERYTHROCYTE [DISTWIDTH] IN BLOOD BY AUTOMATED COUNT: 13.8 % (ref 10–15)
HCT VFR BLD AUTO: 49.9 % (ref 40–53)
HGB BLD-MCNC: 16.7 G/DL (ref 13.3–17.7)
IMM GRANULOCYTES # BLD: 0 10E3/UL
IMM GRANULOCYTES NFR BLD: 0 %
LYMPHOCYTES # BLD AUTO: 2.2 10E3/UL (ref 0.8–5.3)
LYMPHOCYTES NFR BLD AUTO: 24 %
MCH RBC QN AUTO: 29 PG (ref 26.5–33)
MCHC RBC AUTO-ENTMCNC: 33.5 G/DL (ref 31.5–36.5)
MCV RBC AUTO: 87 FL (ref 78–100)
MONOCYTES # BLD AUTO: 0.8 10E3/UL (ref 0–1.3)
MONOCYTES NFR BLD AUTO: 9 %
NEUTROPHILS # BLD AUTO: 6 10E3/UL (ref 1.6–8.3)
NEUTROPHILS NFR BLD AUTO: 64 %
NRBC # BLD AUTO: 0 10E3/UL
NRBC BLD AUTO-RTO: 0 /100
PLATELET # BLD AUTO: 249 10E3/UL (ref 150–450)
RBC # BLD AUTO: 5.76 10E6/UL (ref 4.4–5.9)
WBC # BLD AUTO: 9.3 10E3/UL (ref 4–11)

## 2022-07-01 PROCEDURE — 85025 COMPLETE CBC W/AUTO DIFF WBC: CPT | Performed by: STUDENT IN AN ORGANIZED HEALTH CARE EDUCATION/TRAINING PROGRAM

## 2022-07-01 PROCEDURE — 99214 OFFICE O/P EST MOD 30 MIN: CPT | Performed by: STUDENT IN AN ORGANIZED HEALTH CARE EDUCATION/TRAINING PROGRAM

## 2022-07-01 PROCEDURE — 36415 COLL VENOUS BLD VENIPUNCTURE: CPT | Performed by: STUDENT IN AN ORGANIZED HEALTH CARE EDUCATION/TRAINING PROGRAM

## 2022-07-01 RX ORDER — DEXTROAMPHETAMINE SACCHARATE, AMPHETAMINE ASPARTATE MONOHYDRATE, DEXTROAMPHETAMINE SULFATE AND AMPHETAMINE SULFATE 2.5; 2.5; 2.5; 2.5 MG/1; MG/1; MG/1; MG/1
10 CAPSULE, EXTENDED RELEASE ORAL DAILY
Qty: 30 CAPSULE | Refills: 0 | Status: SHIPPED | OUTPATIENT
Start: 2022-07-01 | End: 2022-07-31

## 2022-07-01 RX ORDER — DEXTROAMPHETAMINE SACCHARATE, AMPHETAMINE ASPARTATE MONOHYDRATE, DEXTROAMPHETAMINE SULFATE AND AMPHETAMINE SULFATE 2.5; 2.5; 2.5; 2.5 MG/1; MG/1; MG/1; MG/1
10 CAPSULE, EXTENDED RELEASE ORAL DAILY
Qty: 30 CAPSULE | Refills: 0 | Status: SHIPPED | OUTPATIENT
Start: 2022-08-01 | End: 2022-07-18

## 2022-07-01 RX ORDER — DEXTROAMPHETAMINE SACCHARATE, AMPHETAMINE ASPARTATE MONOHYDRATE, DEXTROAMPHETAMINE SULFATE AND AMPHETAMINE SULFATE 2.5; 2.5; 2.5; 2.5 MG/1; MG/1; MG/1; MG/1
10 CAPSULE, EXTENDED RELEASE ORAL DAILY
Qty: 30 CAPSULE | Refills: 0 | Status: SHIPPED | OUTPATIENT
Start: 2022-09-01 | End: 2022-07-18

## 2022-07-01 RX ORDER — TRAZODONE HYDROCHLORIDE 50 MG/1
50-100 TABLET, FILM COATED ORAL AT BEDTIME
Qty: 180 TABLET | Refills: 3 | Status: SHIPPED | OUTPATIENT
Start: 2022-07-01 | End: 2023-04-21

## 2022-07-01 NOTE — PROGRESS NOTES
"  Assessment & Plan     Bryan was seen today for follow up and nasal congestion.    Diagnoses and all orders for this visit:    Acute non-recurrent maxillary sinusitis    ADHD (attention deficit hyperactivity disorder), combined type  -     amphetamine-dextroamphetamine (ADDERALL XR) 10 MG 24 hr capsule; Take 1 capsule (10 mg) by mouth daily for 30 days  -     amphetamine-dextroamphetamine (ADDERALL XR) 10 MG 24 hr capsule; Take 1 capsule (10 mg) by mouth daily for 30 days  -     amphetamine-dextroamphetamine (ADDERALL XR) 10 MG 24 hr capsule; Take 1 capsule (10 mg) by mouth daily for 30 days  Chronic condition well controlled. Will do UDS with next visit. 3 months meds Rx.     Insomnia, unspecified type  -     traZODone (DESYREL) 50 MG tablet; Take 1-2 tablets ( mg) by mouth At Bedtime  Not at goal. More difficulty sleeping lately (even on days when not taking adderall so unlikely med side effect.) Wondering if it is ok to increase to 100mg at times. Re wrote rx for  range.     On hormone replacement therapy  -     CBC with platelets differential; Future  -     CBC with platelets differential  Reassuring CBC today. Reviewed recent labs with pt.     Ordering of each unique test  Prescription drug management     BMI:   Estimated body mass index is 32.86 kg/m  as calculated from the following:    Height as of this encounter: 1.82 m (5' 11.65\").    Weight as of this encounter: 108.9 kg (240 lb).   Weight management plan: Deferred. 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    Return in about 3 months (around 10/1/2022) for Preventative Care.    DO HEDY Grullon [facility] ASHA Adams is a 42 year old, presenting for the following health issues:  Follow Up (Pt here for follow up on ADHD medication ) and Nasal Congestion (Congestion and pressure around eyes and forehead, headaches, jaw hurts, nasal discharge. All began two days ago. )      HPI     Nasal symptoms:  Wednesday " "started. Water damage in basement. May have flared symptoms. Now having headache, and sinus pressure.   Using sudafed and ibuprofen. No green or yellow discharge.   Flonase- using twice a day.      ADHD  Taking adderal and going ok. 5 days a week most of the time with drug holidays.   Helping withfocus and work.    Testosterone   Been on T for years, started before coming to [place]. Wanted liver check. Prior elevated LFTs with return to baseline. [facility] lab did not have CBC and would like this checked.         Objective    /74   Pulse 89   Temp 98.2  F (36.8  C) (Oral)   Resp 16   Ht 1.82 m (5' 11.65\")   Wt 108.9 kg (240 lb)   SpO2 100%   BMI 32.86 kg/m    Body mass index is 32.86 kg/m .  Physical Exam  HENT:      Nose:      Right Sinus: Maxillary sinus tenderness present. No frontal sinus tenderness.      Left Sinus: Maxillary sinus tenderness present. No frontal sinus tenderness.        General: Alert and oriented, in no acute distress.  Skin: Warm and dry, no abnormalities noted.  Eyes: Extra-ocular muscles grossly intact, pupils equal.  ENT: Speech intact, nasal passages open, no hearing impairment noted.  CV: No cyanosis or pallor, warm and well perfused.  Respiratory: No respiratory distress, no accessory muscle use.  Neuro: Gait and station normal, comprehension intact. Gross and fine motor skills intact.   Psychiatric: Mood and affect appear normal.   Extremities: Warm, able to move all four extremities at will.    Results for orders placed or performed in visit on 07/01/22   CBC with platelets and differential     Status: None   Result Value Ref Range    WBC Count 9.3 4.0 - 11.0 10e3/uL    RBC Count 5.76 4.40 - 5.90 10e6/uL    Hemoglobin 16.7 13.3 - 17.7 g/dL    Hematocrit 49.9 40.0 - 53.0 %    MCV 87 78 - 100 fL    MCH 29.0 26.5 - 33.0 pg    MCHC 33.5 31.5 - 36.5 g/dL    RDW 13.8 10.0 - 15.0 %    Platelet Count 249 150 - 450 10e3/uL    % Neutrophils 64 %    % Lymphocytes 24 %    % Monocytes 9 %    % " Eosinophils 2 %    % Basophils 1 %    % Immature Granulocytes 0 %    NRBCs per 100 WBC 0 <1 /100    Absolute Neutrophils 6.0 1.6 - 8.3 10e3/uL    Absolute Lymphocytes 2.2 0.8 - 5.3 10e3/uL    Absolute Monocytes 0.8 0.0 - 1.3 10e3/uL    Absolute Eosinophils 0.2 0.0 - 0.7 10e3/uL    Absolute Basophils 0.1 0.0 - 0.2 10e3/uL    Absolute Immature Granulocytes 0.0 <=0.4 10e3/uL    Absolute NRBCs 0.0 10e3/uL   CBC with platelets differential     Status: None    Narrative    The following orders were created for panel order CBC with platelets differential.  Procedure                               Abnormality         Status                     ---------                               -----------         ------                     CBC with platelets and d...[[id number]]                      Final result                 Please view results for these tests on the individual orders.                 .  ..

## 2022-07-06 ENCOUNTER — OFFICE VISIT (OUTPATIENT)
Dept: FAMILY MEDICINE | Facility: CLINIC | Age: 42
End: 2022-07-06
Payer: COMMERCIAL

## 2022-07-06 VITALS
HEIGHT: 71 IN | TEMPERATURE: 98.7 F | HEART RATE: 74 BPM | DIASTOLIC BLOOD PRESSURE: 83 MMHG | OXYGEN SATURATION: 97 % | SYSTOLIC BLOOD PRESSURE: 126 MMHG | BODY MASS INDEX: 34.55 KG/M2 | WEIGHT: 246.8 LBS

## 2022-07-06 DIAGNOSIS — J01.90 ACUTE BACTERIAL SINUSITIS: Primary | ICD-10-CM

## 2022-07-06 DIAGNOSIS — M76.71 PERONEAL TENDONITIS, RIGHT: ICD-10-CM

## 2022-07-06 DIAGNOSIS — B96.89 ACUTE BACTERIAL SINUSITIS: Primary | ICD-10-CM

## 2022-07-06 PROCEDURE — 99214 OFFICE O/P EST MOD 30 MIN: CPT | Mod: GC | Performed by: STUDENT IN AN ORGANIZED HEALTH CARE EDUCATION/TRAINING PROGRAM

## 2022-07-06 RX ORDER — NAPROXEN 500 MG/1
500 TABLET ORAL 2 TIMES DAILY WITH MEALS
Qty: 14 TABLET | Refills: 0 | Status: SHIPPED | OUTPATIENT
Start: 2022-07-06 | End: 2022-07-13

## 2022-07-06 NOTE — PROGRESS NOTES
Preceptor Attestation:   Patient seen, evaluated and discussed with the resident. I have verified the content of the note, which accurately reflects my assessment of the patient and the plan of care.   Supervising Physician:  Gustavo Ruiz MD

## 2022-07-06 NOTE — PROGRESS NOTES
Assessment & Plan     Peroneal tendonitis, right  Based on patient story of increased exercise doing new activity at work and then acute inflammatory symptoms that correlate on physical exam with peroneal tendons, likely patient has peroneal tendonitis. Considered plantar fascitis but exam is more consistent with tenderness along the peroneal tendons. Recommended 1st step as icing, rest, elevation, insoles, naproxen -> progress to gentle stretches (see avs) and rolling out with water bottle / tennis ball bottom of foot -> then work on strengthening. Patient has benefits for external PT, so external PT referral placed if he would like to try additional exercises with support of PT after acute inflammation goes down. Discussed modifications / taking it easy at work.   - naproxen (NAPROSYN) 500 MG tablet  Dispense: 14 tablet; Refill: 0  - Physical Therapy Referral    Acute bacterial sinusitis  Symptoms consistent with likely bacterial acute rhinosinusitis. Patient has worsening of nasal drainage to now be purulent for ~ 7 days. Some signs of double worsening. No red flag symptoms: high, persistent fevers >102 F; periorbital edema, inflammation, or erythema; cranial nerve palsies; abnormal extraocular movements; proptosis; vision changes (double vision or impaired vision); severe headache; altered mental status; or meningeal signs. Patient has trailed conservative management without success, discussed with patient I will sent antibiotic prescription to pharmacy, could wait 3 more days to ensure not getting better and at 10 day arabella take the antibiotics. Patient will see if any improvement next 24-72 hours and if not will start antibiotic prescription. Discussed if after full antibiotic course of 5 days no improvement that we may have to consider referral to ENT.  -     amoxicillin-clavulanate (AUGMENTIN) 875-125 MG tablet; Take 1 tablet by mouth 2 times daily for 5 days    Prescription drug management    Return if  symptoms worsen or fail to improve.    Arlin Mosqueda,   Family Medicine PGY-3  [facility], [facility]   Pronouns: She/Hers    Subjective   Bryan is a 42 year old presenting for the following health issues:  Musculoskeletal Problem (Pt has pain on his lateral side of right foot and radiates up towards the thigh. Pain started on Monday at work. Pt states having heel pain in the same foot since this morning. 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   Drove something that required a lot more standing, moving things - a LOT more activity than normal  Outside right and heel - pain   Pain was so severe felt like running up his leg   Sat down on break - little better   Got insoles - got through rest of night throbbing pain, insoles did help   Ibuprofen kicked in slept a little   Better than yesterday   In college had a nerve issue in ball of foot - only other foot problem he has had  Off feet better  Not worse time of day ( like the morning)  Has tried icing too   Would be open to PT (external referral through work)    Sinus pain and green foul tasting drainage  Last Wednesdayjune 29th  - noticed drainage from left nostril   Bend over drainage left nostril was more watery?-> now its different not watery yellow green mucus   Tasts super foul when he swallows it  Got worse? Not better  Left maxillary sinus is quite tender  Teeth hurt too (worsening)  Sinus pressure (worsening)  Feeling more frontal sinus pressure  Used sudafed, ibupforen, Flonase (takes it normally)   Nasal lavage twice a day   Really trying to make it get better, but not happening  Water damage basement drying it out on Sunday - mold  Maybe flared symptoms?   Hasn't had an issue like this in a long time  Saw   "Yamel - trying conservative things first   Was told to comeback if not better hence wanted to bring up today  No fever/chills  Overall fatigued though (getting over COVID-19 earlier in the month)     Review of Systems   Constitutional, HEENT, cardiovascular, pulmonary, gi and gu systems are negative, except as otherwise noted.      Objective    /83   Pulse 74   Temp 98.7  F (37.1  C) (Oral)   Ht 1.816 m (5' 11.5\")   Wt 111.9 kg (246 lb 12.8 oz)   SpO2 97%   BMI 33.95 kg/m    Body mass index is 33.95 kg/m .  Physical Exam     Gen: A&O, NAD  Skin: warm, dry on visible skin   HEENT: Head: NC, AT Eyes: PERRLA, no conjunctival injection, no scleral icterus, EOMI. Ears: external auditory canals patent, TM intact without bulging or effusion. Gross auditory acuity intact. Nose: nares patent, moderate turbinate inflammation, moderate discharge, mucosa pink. Left maxillary sinus acutely tender to tapping. Frontal sinus with mild tenderness. Pressure felt when leaning head forward. Throat: Oral mucosa pink, pharynx without erythema or exudate, tongue midline.   Neck: no LAD, no thyromegaly, no mass, trachea midline  Heart: S1S2 RRR   Lungs: CTAB no chest wall deformity and asymmetry no w/r/r  Neuro: Comprehension intact. Gross and fine motor skills intact.   Psychiatric: Mood and affect appear normal.   MSK: Ankle&Foot: Full pain-free active and passive ROM of R and L feet and ankles, without synovitis, asymmetry,  deformities, effusion. Tenderness along peroneal tendons as they wrap under sole of foot. No other tenderness, including no plantar fascia tenderness.  No periarticular muscle atrophy. Ø instability of tibiotalar, talocalcaneal, transverse tarsal, and metatarsophalangeal joints. Negative ankle anterior drawer sign.             .  ..  "

## 2022-07-06 NOTE — PATIENT INSTRUCTIONS
"Patient Education   Here is the plan from today's visit    1. Acute bacterial sinusitis  10 days not better - TAKE!  Or if suddenly worse in the next 3 days.   If not better after the full course of antibiotics - [employer] message - maybe need ENT referral     - amoxicillin-clavulanate (AUGMENTIN) 875-125 MG tablet; Take 1 tablet by mouth 2 times daily for 5 days  Dispense: 10 tablet; Refill: 0    2. Peroneal tendonitis, right  Icing!  Stretching - google   Roll out bottom of foot - tennis ball, lacrosse bowel, icewater bottle   Wear the insoles if they are helping - good shoes (supportive, tennis shoe / sneaker type of deal)   Let it \"chill out\" before you work on serious exercises! Rest and get inflammation down first. Then gentle stretching  / rolling out -> then strengthening.     - naproxen (NAPROSYN) 500 MG tablet; Take 1 tablet (500 mg) by mouth 2 times daily (with meals) for 7 days  Dispense: 14 tablet; Refill: 0  - Physical Therapy Referral; Future    Please call or return to clinic if your symptoms don't go away.    Follow up plan  Return if symptoms worsen or fail to improve.    Thank you for coming to [facility] today.  Lab Testing:  **If you had lab testing today and your results are reassuring or normal they will be mailed to you or sent through [employer] within 7 days.   **If the lab tests need quick action we will call you with the results.  **If you are having labs done on a different day, please call [phone] to schedule at [facility] or [phone] for other [facility] locations. Labs do not offer walk-in appointments.  The phone number we will call with results is # [phone] (home) . If this is not the best number please call our clinic and change the number.  Medication Refills:  If you need any refills please call your pharmacy and they will contact us.   If you need to  your refill at a new pharmacy, please contact the new pharmacy directly. The new " pharmacy will help you get your medications transferred faster.   Scheduling:  If you have any concerns about today's visit or wish to schedule another appointment please call our office during normal business hours [phone] (8-5:00 M-F)  If a referral was made to an [facility] specialty provider and you do not get a call from central scheduling, please refer to directions on your visit summary or call our office during normal business hours for assistance.   If a Mammogram was ordered for you at the [facility] call [phone] to schedule or change your appointment.  If you had an XRay/CT/Ultrasound/MRI ordered the number is [phone] to schedule or change your radiology appointment.   [facility] has limited ultrasound appointments available on Wednesdays, if you would like your ultrasound at [facility], please call [phone] to schedule.   Medical Concerns:  If you have urgent medical concerns please call [phone] at any time of the day.    Mariyajim Mosqueda, DO           Towel stretch       To perform this stretch, a person will require a bath or pool towel.  Sit on the ground with the feet straight out in front.  Take the towel and wrap it around the toes on one foot.  Gently pull back until a stretch runs from the bottom of the foot up to the back of the lower leg.  Hold this stretch for 30-60 seconds.  Switch to the other leg and repeat.  A person should do this exercise 2-3 times on each side.  Standing calf stretch       The standing calf stretch requires a sturdy closed door or a blank wall.  Stand facing the wall or door and place the palms against it, slightly higher than the shoulders.  Step back into a split stance, keeping both feet flat on the ground with the toes pointing forward.  Slowly lean forward and bend the front knee to feel a stretch in the lower part of the back leg.  Hold the position for up to 30 seconds.  If a person is not feeling the stretch, they can  try bending the back knee slightly while pushing the heel into the floor.  A person should perform this exercise 2-3 times on each side.  Heel raises       A chair, countertop, or table is necessary for this exercise.  Stand behind the chair, countertop, or table and hold onto it for support.  Rise onto the toes and hold the position for 5-10 seconds.  Letting go of the support, lower the heels down slowly.  If necessary, when lowering down, keep holding the support for balance.  People can repeat this exercise 5-10 times.  Plantar fascia stretch       A person will need to sit on a chair for this stretch. They will also require either a foam roller, tennis ball, or food can.  Sit on the chair and place the foam roller, tennis ball, or food can under one foot.  Roll the foot back and forth over the object for 1 minute. Then do the same on the other foot.  Then, cross one leg over the other, hold the big toe of the crossed leg, and gently pull it toward the body. Hold this for 30-45 seconds.  Switch the legs over and repeat this movement.  A person should do this cycle of stretches 2-3 times.  Ankle flexion       To do an ankle flexion, a person will require a resistance band.  Sitting upright on the floor, place the resistance band around the ball of one foot and then extend that leg out in front.  Point the toes on the extended leg away from the body, then slowly flex the ankle by pulling the toes toward the shin. Repeat the movement up to 10 times.  Repeat the exercise on the other leg.

## 2022-07-24 ENCOUNTER — HEALTH MAINTENANCE LETTER (OUTPATIENT)
Age: 42
End: 2022-07-24

## 2022-08-11 ENCOUNTER — MYC MEDICAL ADVICE (OUTPATIENT)
Dept: FAMILY MEDICINE | Facility: CLINIC | Age: 42
End: 2022-08-11

## 2022-08-19 DIAGNOSIS — Z20.6 CONTACT WITH AND (SUSPECTED) EXPOSURE TO HUMAN IMMUNODEFICIENCY VIRUS (HIV): ICD-10-CM

## 2022-08-19 RX ORDER — EMTRICITABINE AND TENOFOVIR DISOPROXIL FUMARATE 200; 300 MG/1; MG/1
1 TABLET, FILM COATED ORAL DAILY
Qty: 90 TABLET | Refills: 0 | Status: SHIPPED | OUTPATIENT
Start: 2022-08-19 | End: 2022-08-31

## 2022-08-19 NOTE — TELEPHONE ENCOUNTER
"Request for medication refill:  emtricitabine-tenofovir (TRUVADA) 200-300 MG per tablet    Providers if patient needs an appointment and you are willing to give a one month supply please refill for one month and  send a letter/MyChart using \".SMILLIMITEDREFILL\" .smillimited and route chart to \"P SMI \" (Giving one month refill in non controlled medications is strongly recommended before denial)    If refill has been denied, meaning absolutely no refills without visit, please complete the smart phrase \".smirxrefuse\" and route it to the \"P SMI MED REFILLS\"  pool to inform the patient and the pharmacy.    Karla Clements MA        "

## 2022-08-31 ENCOUNTER — MYC REFILL (OUTPATIENT)
Dept: FAMILY MEDICINE | Facility: CLINIC | Age: 42
End: 2022-08-31

## 2022-08-31 DIAGNOSIS — Z20.6 CONTACT WITH AND (SUSPECTED) EXPOSURE TO HUMAN IMMUNODEFICIENCY VIRUS (HIV): ICD-10-CM

## 2022-08-31 DIAGNOSIS — F90.2 ADHD (ATTENTION DEFICIT HYPERACTIVITY DISORDER), COMBINED TYPE: ICD-10-CM

## 2022-08-31 RX ORDER — EMTRICITABINE AND TENOFOVIR DISOPROXIL FUMARATE 200; 300 MG/1; MG/1
1 TABLET, FILM COATED ORAL DAILY
Qty: 90 TABLET | Refills: 0 | Status: CANCELLED | OUTPATIENT
Start: 2022-08-31

## 2022-08-31 RX ORDER — DEXTROAMPHETAMINE SACCHARATE, AMPHETAMINE ASPARTATE MONOHYDRATE, DEXTROAMPHETAMINE SULFATE AND AMPHETAMINE SULFATE 5; 5; 5; 5 MG/1; MG/1; MG/1; MG/1
20 CAPSULE, EXTENDED RELEASE ORAL DAILY
Qty: 30 CAPSULE | Refills: 0 | Status: SHIPPED | OUTPATIENT
Start: 2022-08-31 | End: 2022-08-31

## 2022-08-31 RX ORDER — DEXTROAMPHETAMINE SACCHARATE, AMPHETAMINE ASPARTATE MONOHYDRATE, DEXTROAMPHETAMINE SULFATE AND AMPHETAMINE SULFATE 5; 5; 5; 5 MG/1; MG/1; MG/1; MG/1
20 CAPSULE, EXTENDED RELEASE ORAL DAILY
Qty: 30 CAPSULE | Refills: 0 | Status: SHIPPED | OUTPATIENT
Start: 2022-08-31 | End: 2022-10-19

## 2022-08-31 NOTE — TELEPHONE ENCOUNTER

## 2022-09-22 ENCOUNTER — IMMUNIZATION (OUTPATIENT)
Dept: NURSING | Facility: CLINIC | Age: 42
End: 2022-09-22
Payer: COMMERCIAL

## 2022-09-22 PROCEDURE — 91312 COVID-19,PF,PFIZER BOOSTER BIVALENT: CPT

## 2022-09-22 PROCEDURE — 0124A COVID-19,PF,PFIZER BOOSTER BIVALENT: CPT

## 2022-10-03 ENCOUNTER — HEALTH MAINTENANCE LETTER (OUTPATIENT)
Age: 42
End: 2022-10-03

## 2022-10-19 ENCOUNTER — MYC REFILL (OUTPATIENT)
Dept: FAMILY MEDICINE | Facility: CLINIC | Age: 42
End: 2022-10-19

## 2022-10-19 DIAGNOSIS — F90.2 ADHD (ATTENTION DEFICIT HYPERACTIVITY DISORDER), COMBINED TYPE: ICD-10-CM

## 2022-10-19 DIAGNOSIS — L65.9 HAIR LOSS: Primary | ICD-10-CM

## 2022-10-20 RX ORDER — DEXTROAMPHETAMINE SACCHARATE, AMPHETAMINE ASPARTATE MONOHYDRATE, DEXTROAMPHETAMINE SULFATE AND AMPHETAMINE SULFATE 5; 5; 5; 5 MG/1; MG/1; MG/1; MG/1
20 CAPSULE, EXTENDED RELEASE ORAL DAILY
Qty: 30 CAPSULE | Refills: 0 | Status: SHIPPED | OUTPATIENT
Start: 2022-10-20 | End: 2023-01-18

## 2022-10-20 RX ORDER — FINASTERIDE 1 MG/1
1 TABLET, FILM COATED ORAL DAILY
Qty: 90 TABLET | Refills: 3 | Status: SHIPPED | OUTPATIENT
Start: 2022-10-20 | End: 2022-10-27

## 2022-10-27 ENCOUNTER — MYC MEDICAL ADVICE (OUTPATIENT)
Dept: FAMILY MEDICINE | Facility: CLINIC | Age: 42
End: 2022-10-27

## 2022-10-27 ENCOUNTER — LAB (OUTPATIENT)
Dept: LAB | Facility: CLINIC | Age: 42
End: 2022-10-27
Payer: COMMERCIAL

## 2022-10-27 DIAGNOSIS — Z11.3 ROUTINE SCREENING FOR STI (SEXUALLY TRANSMITTED INFECTION): ICD-10-CM

## 2022-10-27 DIAGNOSIS — Z11.4 SCREENING FOR HIV (HUMAN IMMUNODEFICIENCY VIRUS): ICD-10-CM

## 2022-10-27 DIAGNOSIS — L65.9 HAIR LOSS: ICD-10-CM

## 2022-10-27 DIAGNOSIS — Z86.19 HISTORY OF LATENT SYPHILIS: ICD-10-CM

## 2022-10-27 PROCEDURE — 86592 SYPHILIS TEST NON-TREP QUAL: CPT

## 2022-10-27 PROCEDURE — 86593 SYPHILIS TEST NON-TREP QUANT: CPT

## 2022-10-27 PROCEDURE — 36415 COLL VENOUS BLD VENIPUNCTURE: CPT

## 2022-10-27 PROCEDURE — 87389 HIV-1 AG W/HIV-1&-2 AB AG IA: CPT

## 2022-10-27 PROCEDURE — 87591 N.GONORRHOEAE DNA AMP PROB: CPT

## 2022-10-27 PROCEDURE — 87491 CHLMYD TRACH DNA AMP PROBE: CPT

## 2022-10-27 RX ORDER — FINASTERIDE 1 MG/1
1 TABLET, FILM COATED ORAL DAILY
Qty: 90 TABLET | Refills: 3 | Status: SHIPPED | OUTPATIENT
Start: 2022-10-27 | End: 2023-04-21

## 2022-10-28 LAB
C TRACH DNA SPEC QL PROBE+SIG AMP: NEGATIVE
HIV 1+2 AB+HIV1 P24 AG SERPL QL IA: NONREACTIVE
N GONORRHOEA DNA SPEC QL NAA+PROBE: NEGATIVE
RPR SER QL: REACTIVE
RPR SER-TITR: ABNORMAL {TITER}

## 2022-11-03 DIAGNOSIS — Z20.6 CONTACT WITH AND (SUSPECTED) EXPOSURE TO HUMAN IMMUNODEFICIENCY VIRUS (HIV): ICD-10-CM

## 2022-11-03 NOTE — TELEPHONE ENCOUNTER
[facility] phone call message- patient requesting a refill:    Full Medication Name: emtricitabine-tenofovir (TRUVADA) 200-300 MG per tablet    Dose: Take 1 tablet by mouth daily - Oral    Pharmacy confirmed as   [facility] [address]  [address] [address]  Phone: [phone] Fax: [phone]  : Yes    Additional Comments: Pharmacy called to request a refill for patient.      OK to leave a message on voice mail? Yes    Primary language: English      needed? No    Call taken on November 3, 2022 at 9:14 AM by Serina Ivey

## 2022-11-07 RX ORDER — EMTRICITABINE AND TENOFOVIR DISOPROXIL FUMARATE 200; 300 MG/1; MG/1
1 TABLET, FILM COATED ORAL DAILY
Qty: 90 TABLET | Refills: 0 | Status: SHIPPED | OUTPATIENT
Start: 2022-11-07 | End: 2023-02-02

## 2022-11-07 NOTE — TELEPHONE ENCOUNTER

## 2022-11-25 ENCOUNTER — OFFICE VISIT (OUTPATIENT)
Dept: FAMILY MEDICINE | Facility: CLINIC | Age: 42
End: 2022-11-25
Payer: COMMERCIAL

## 2022-11-25 VITALS
HEART RATE: 101 BPM | HEIGHT: 71 IN | SYSTOLIC BLOOD PRESSURE: 146 MMHG | RESPIRATION RATE: 18 BRPM | BODY MASS INDEX: 37.38 KG/M2 | DIASTOLIC BLOOD PRESSURE: 77 MMHG | WEIGHT: 267 LBS | TEMPERATURE: 98.7 F | OXYGEN SATURATION: 100 %

## 2022-11-25 DIAGNOSIS — F90.2 ADHD (ATTENTION DEFICIT HYPERACTIVITY DISORDER), COMBINED TYPE: Primary | ICD-10-CM

## 2022-11-25 DIAGNOSIS — F33.8 SEASONAL AFFECTIVE DISORDER (H): ICD-10-CM

## 2022-11-25 LAB
AMPHETAMINES UR QL: DETECTED
BARBITURATES UR QL SCN: NOT DETECTED
BENZODIAZ UR QL SCN: NOT DETECTED
BUPRENORPHINE UR QL: NOT DETECTED
CANNABINOIDS UR QL: NOT DETECTED
COCAINE UR QL SCN: NOT DETECTED
D-METHAMPHET UR QL: NOT DETECTED
METHADONE UR QL SCN: NOT DETECTED
OPIATES UR QL SCN: NOT DETECTED
OXYCODONE UR QL SCN: NOT DETECTED
PCP UR QL SCN: NOT DETECTED
PROPOXYPH UR QL: NOT DETECTED
TRICYCLICS UR QL SCN: NOT DETECTED

## 2022-11-25 PROCEDURE — 80306 DRUG TEST PRSMV INSTRMNT: CPT | Performed by: STUDENT IN AN ORGANIZED HEALTH CARE EDUCATION/TRAINING PROGRAM

## 2022-11-25 PROCEDURE — 99214 OFFICE O/P EST MOD 30 MIN: CPT | Mod: GC | Performed by: STUDENT IN AN ORGANIZED HEALTH CARE EDUCATION/TRAINING PROGRAM

## 2022-11-25 RX ORDER — SERTRALINE HYDROCHLORIDE 25 MG/1
25 TABLET, FILM COATED ORAL DAILY
Qty: 30 TABLET | Refills: 1 | Status: SHIPPED | OUTPATIENT
Start: 2022-11-25 | End: 2023-01-13

## 2022-11-25 RX ORDER — DEXTROAMPHETAMINE SACCHARATE, AMPHETAMINE ASPARTATE MONOHYDRATE, DEXTROAMPHETAMINE SULFATE AND AMPHETAMINE SULFATE 5; 5; 5; 5 MG/1; MG/1; MG/1; MG/1
20 CAPSULE, EXTENDED RELEASE ORAL DAILY
Qty: 30 CAPSULE | Refills: 0 | Status: SHIPPED | OUTPATIENT
Start: 2022-12-26 | End: 2023-01-18

## 2022-11-25 RX ORDER — DEXTROAMPHETAMINE SACCHARATE, AMPHETAMINE ASPARTATE MONOHYDRATE, DEXTROAMPHETAMINE SULFATE AND AMPHETAMINE SULFATE 5; 5; 5; 5 MG/1; MG/1; MG/1; MG/1
20 CAPSULE, EXTENDED RELEASE ORAL DAILY
Qty: 30 CAPSULE | Refills: 0 | Status: SHIPPED | OUTPATIENT
Start: 2022-11-25 | End: 2022-12-25

## 2022-11-25 RX ORDER — DEXTROAMPHETAMINE SACCHARATE, AMPHETAMINE ASPARTATE MONOHYDRATE, DEXTROAMPHETAMINE SULFATE AND AMPHETAMINE SULFATE 5; 5; 5; 5 MG/1; MG/1; MG/1; MG/1
20 CAPSULE, EXTENDED RELEASE ORAL DAILY
Qty: 30 CAPSULE | Refills: 0 | Status: SHIPPED | OUTPATIENT
Start: 2023-01-26 | End: 2023-02-02

## 2022-11-25 NOTE — PROGRESS NOTES
Preceptor Attestation:    I discussed the patient with the resident and evaluated the patient in person. I have verified the content of the note, which accurately reflects my assessment of the patient and the plan of care.   Supervising Physician:  Madison Ennis DO.

## 2022-12-15 DIAGNOSIS — K21.9 GASTROESOPHAGEAL REFLUX DISEASE, UNSPECIFIED WHETHER ESOPHAGITIS PRESENT: ICD-10-CM

## 2022-12-15 RX ORDER — FAMOTIDINE 40 MG/1
TABLET, FILM COATED ORAL
Qty: 180 TABLET | Refills: 0 | Status: SHIPPED | OUTPATIENT
Start: 2022-12-15 | End: 2023-01-30

## 2022-12-15 NOTE — TELEPHONE ENCOUNTER
"Request for medication refill:  Famotidine  Providers if patient needs an appointment and you are willing to give a one month supply please refill for one month and  send a letter/MyChart using \".SMILLIMITEDREFILL\" .smillimited and route chart to \"P SMI \" (Giving one month refill in non controlled medications is strongly recommended before denial)    If refill has been denied, meaning absolutely no refills without visit, please complete the smart phrase \".smirxrefuse\" and route it to the \"P SMI MED REFILLS\"  pool to inform the patient and the pharmacy.    Karla Clements MA        "

## 2022-12-17 DIAGNOSIS — F33.8 SEASONAL AFFECTIVE DISORDER (H): ICD-10-CM

## 2022-12-19 RX ORDER — SERTRALINE HYDROCHLORIDE 25 MG/1
TABLET, FILM COATED ORAL
Qty: 30 TABLET | Refills: 1 | OUTPATIENT
Start: 2022-12-19

## 2022-12-19 NOTE — TELEPHONE ENCOUNTER
"Last seen 11/25/2022    Request for medication refill:  sertraline (ZOLOFT) 25 MG tablet  Providers if patient needs an appointment and you are willing to give a one month supply please refill for one month and  send a letter/MyChart using \".SMILLIMITEDREFILL\" .smillimited and route chart to \"P SMI \" (Giving one month refill in non controlled medications is strongly recommended before denial)    If refill has been denied, meaning absolutely no refills without visit, please complete the smart phrase \".smirxrefuse\" and route it to the \"P SMI MED REFILLS\"  pool to inform the patient and the pharmacy.    Laisha Archer RN        "

## 2023-01-12 DIAGNOSIS — F33.8 SEASONAL AFFECTIVE DISORDER (H): ICD-10-CM

## 2023-01-13 RX ORDER — SERTRALINE HYDROCHLORIDE 25 MG/1
TABLET, FILM COATED ORAL
Qty: 30 TABLET | Refills: 1 | Status: SHIPPED | OUTPATIENT
Start: 2023-01-13 | End: 2023-01-18

## 2023-01-13 ASSESSMENT — SLEEP AND FATIGUE QUESTIONNAIRES
HOW LIKELY ARE YOU TO NOD OFF OR FALL ASLEEP WHILE LYING DOWN TO REST IN THE AFTERNOON WHEN CIRCUMSTANCES PERMIT: SLIGHT CHANCE OF DOZING
HOW LIKELY ARE YOU TO NOD OFF OR FALL ASLEEP WHILE SITTING AND TALKING TO SOMEONE: WOULD NEVER DOZE
HOW LIKELY ARE YOU TO NOD OFF OR FALL ASLEEP IN A CAR, WHILE STOPPED FOR A FEW MINUTES IN TRAFFIC: WOULD NEVER DOZE
HOW LIKELY ARE YOU TO NOD OFF OR FALL ASLEEP WHILE SITTING AND READING: WOULD NEVER DOZE
HOW LIKELY ARE YOU TO NOD OFF OR FALL ASLEEP WHILE WATCHING TV: WOULD NEVER DOZE
HOW LIKELY ARE YOU TO NOD OFF OR FALL ASLEEP WHILE SITTING QUIETLY AFTER LUNCH WITHOUT ALCOHOL: WOULD NEVER DOZE
HOW LIKELY ARE YOU TO NOD OFF OR FALL ASLEEP WHILE SITTING INACTIVE IN A PUBLIC PLACE: WOULD NEVER DOZE
HOW LIKELY ARE YOU TO NOD OFF OR FALL ASLEEP WHEN YOU ARE A PASSENGER IN A CAR FOR AN HOUR WITHOUT A BREAK: WOULD NEVER DOZE

## 2023-01-13 NOTE — TELEPHONE ENCOUNTER
"Last seen 11/25/22    Request for medication refill:  sertraline (ZOLOFT) 25 MG tablet  Providers if patient needs an appointment and you are willing to give a one month supply please refill for one month and  send a letter/MyChart using \".SMILLIMITEDREFILL\" .smillimited and route chart to \"P SMI \" (Giving one month refill in non controlled medications is strongly recommended before denial)    If refill has been denied, meaning absolutely no refills without visit, please complete the smart phrase \".smirxrefuse\" and route it to the \"P SMI MED REFILLS\"  pool to inform the patient and the pharmacy.    Laisha Archer RN        "

## 2023-01-18 ENCOUNTER — OFFICE VISIT (OUTPATIENT)
Dept: FAMILY MEDICINE | Facility: CLINIC | Age: 43
End: 2023-01-18
Payer: COMMERCIAL

## 2023-01-18 VITALS
WEIGHT: 253.8 LBS | SYSTOLIC BLOOD PRESSURE: 110 MMHG | DIASTOLIC BLOOD PRESSURE: 70 MMHG | BODY MASS INDEX: 35.4 KG/M2 | RESPIRATION RATE: 16 BRPM | TEMPERATURE: 98.7 F | HEART RATE: 84 BPM | OXYGEN SATURATION: 97 %

## 2023-01-18 DIAGNOSIS — I10 ESSENTIAL HYPERTENSION: ICD-10-CM

## 2023-01-18 DIAGNOSIS — F33.8 SEASONAL AFFECTIVE DISORDER (H): Primary | ICD-10-CM

## 2023-01-18 DIAGNOSIS — Z20.6 CONTACT WITH AND (SUSPECTED) EXPOSURE TO HUMAN IMMUNODEFICIENCY VIRUS (HIV): ICD-10-CM

## 2023-01-18 PROCEDURE — 86593 SYPHILIS TEST NON-TREP QUANT: CPT | Performed by: STUDENT IN AN ORGANIZED HEALTH CARE EDUCATION/TRAINING PROGRAM

## 2023-01-18 PROCEDURE — 36415 COLL VENOUS BLD VENIPUNCTURE: CPT | Performed by: STUDENT IN AN ORGANIZED HEALTH CARE EDUCATION/TRAINING PROGRAM

## 2023-01-18 PROCEDURE — 87491 CHLMYD TRACH DNA AMP PROBE: CPT | Performed by: STUDENT IN AN ORGANIZED HEALTH CARE EDUCATION/TRAINING PROGRAM

## 2023-01-18 PROCEDURE — 87591 N.GONORRHOEAE DNA AMP PROB: CPT | Performed by: STUDENT IN AN ORGANIZED HEALTH CARE EDUCATION/TRAINING PROGRAM

## 2023-01-18 PROCEDURE — 86780 TREPONEMA PALLIDUM: CPT | Performed by: STUDENT IN AN ORGANIZED HEALTH CARE EDUCATION/TRAINING PROGRAM

## 2023-01-18 PROCEDURE — 87389 HIV-1 AG W/HIV-1&-2 AB AG IA: CPT | Performed by: STUDENT IN AN ORGANIZED HEALTH CARE EDUCATION/TRAINING PROGRAM

## 2023-01-18 PROCEDURE — 86592 SYPHILIS TEST NON-TREP QUAL: CPT | Performed by: STUDENT IN AN ORGANIZED HEALTH CARE EDUCATION/TRAINING PROGRAM

## 2023-01-18 PROCEDURE — 99214 OFFICE O/P EST MOD 30 MIN: CPT | Mod: GC | Performed by: STUDENT IN AN ORGANIZED HEALTH CARE EDUCATION/TRAINING PROGRAM

## 2023-01-18 RX ORDER — SERTRALINE HYDROCHLORIDE 25 MG/1
25 TABLET, FILM COATED ORAL DAILY
Qty: 90 TABLET | Refills: 1 | Status: SHIPPED | OUTPATIENT
Start: 2023-01-18 | End: 2023-04-06

## 2023-01-18 ASSESSMENT — PATIENT HEALTH QUESTIONNAIRE - PHQ9: SUM OF ALL RESPONSES TO PHQ QUESTIONS 1-9: 2

## 2023-01-18 NOTE — PROGRESS NOTES
Assessment & Plan     Seasonal affective disorder (H)  Significantly improved with PHQ-9 of 2 today.  Continue Zoloft as prescribed and we will consider tapering this spring/summer if patient so desires.  - sertraline (ZOLOFT) 25 MG tablet  Dispense: 90 tablet; Refill: 1    PrEP Candidate  Patient due for prep labs.  - Treponema Abs w Reflex to RPR and Titer  - HIV Antigen Antibody Combo  - Chlamydia trachomatis/Neisseria gonorrhoeae by PCR - Clinic Collect    Essential hypertension  Patient with very well-controlled hypertension on 5 mg of lisinopril.  We briefly discussed considering tapering off this in the coming months if it continues to be so well controlled.      Return in about 3 months (around 4/18/2023).    Wilner Larose MD  [facility] ASHA Adams is a 42 year old, presenting for the following health issues:  RECHECK (Pt is here for BP follow up )      HPI   Overall patient is happy with current effect of Zoloft.  We discussed continuing exercises and light and on a therapist that patient has been doing for years and will continue with this dose through the spring and consider tapering late spring or early summer.    Patient also here for recheck of blood pressure which has been at goal.  No other concerns.    Review of Systems   Constitutional, HEENT, cardiovascular, pulmonary, gi and gu systems are negative, except as otherwise noted.      Objective    /70 (BP Location: Left arm, Patient Position: Sitting)   Pulse 84   Temp 98.7  F (37.1  C) (Oral)   Resp 16   Wt 115.1 kg (253 lb 12.8 oz)   SpO2 97%   BMI 35.40 kg/m    Body mass index is 35.4 kg/m .  Physical Exam  Vitals reviewed.   Constitutional:       General: He is not in acute distress.     Appearance: Normal appearance. He is not ill-appearing.   HENT:      Head: Normocephalic and atraumatic.   Eyes:      Conjunctiva/sclera: Conjunctivae normal.   Cardiovascular:      Rate and Rhythm: Normal rate  and regular rhythm.      Heart sounds: No murmur heard.  Pulmonary:      Effort: Pulmonary effort is normal. No respiratory distress.      Breath sounds: No wheezing.   Musculoskeletal:         General: No deformity. Normal range of motion.   Skin:     General: Skin is warm and dry.   Neurological:      General: No focal deficit present.      Mental Status: He is alert.      Motor: No weakness.      Gait: Gait normal.   Psychiatric:         Behavior: Behavior normal.         Thought Content: Thought content normal.

## 2023-01-19 PROBLEM — F90.2 ADHD (ATTENTION DEFICIT HYPERACTIVITY DISORDER), COMBINED TYPE: Chronic | Status: ACTIVE | Noted: 2022-05-26

## 2023-01-19 PROBLEM — I10 ESSENTIAL HYPERTENSION: Chronic | Status: ACTIVE | Noted: 2021-06-08

## 2023-01-19 PROBLEM — F33.8 SEASONAL AFFECTIVE DISORDER (H): Chronic | Status: ACTIVE | Noted: 2023-01-18

## 2023-01-19 LAB
C TRACH DNA SPEC QL PROBE+SIG AMP: NEGATIVE
HIV 1+2 AB+HIV1 P24 AG SERPL QL IA: NONREACTIVE
N GONORRHOEA DNA SPEC QL NAA+PROBE: NEGATIVE
RPR SER QL: REACTIVE
RPR SER-TITR: ABNORMAL {TITER}
T PALLIDUM AB SER QL: REACTIVE

## 2023-01-20 ENCOUNTER — OFFICE VISIT (OUTPATIENT)
Dept: SLEEP MEDICINE | Facility: CLINIC | Age: 43
End: 2023-01-20
Attending: STUDENT IN AN ORGANIZED HEALTH CARE EDUCATION/TRAINING PROGRAM
Payer: COMMERCIAL

## 2023-01-20 VITALS
SYSTOLIC BLOOD PRESSURE: 119 MMHG | HEART RATE: 86 BPM | DIASTOLIC BLOOD PRESSURE: 80 MMHG | WEIGHT: 253 LBS | OXYGEN SATURATION: 97 % | HEIGHT: 71 IN | BODY MASS INDEX: 35.42 KG/M2

## 2023-01-20 DIAGNOSIS — G47.33 OSA (OBSTRUCTIVE SLEEP APNEA): Primary | ICD-10-CM

## 2023-01-20 DIAGNOSIS — G47.00 INSOMNIA, UNSPECIFIED TYPE: Chronic | ICD-10-CM

## 2023-01-20 DIAGNOSIS — E66.01 MORBID OBESITY (H): ICD-10-CM

## 2023-01-20 PROCEDURE — 99204 OFFICE O/P NEW MOD 45 MIN: CPT | Performed by: PHYSICIAN ASSISTANT

## 2023-01-20 NOTE — PROGRESS NOTES
Outpatient Sleep Medicine Consultation:      Name: Bryan Salazar MRN# [id number]   Age: 42 year old YOB: 1980     Date of Consultation: January 20, 2023  Consultation is requested by: Jovani Montesinos DO  [address] [place] [address] Jovani Montesinos  Primary care provider: Jovani Montesinos       Reason for Sleep Consult:     Bryan Salazar is sent by Jovani Montesinos for a sleep consultation regarding obstructive sleep apnea.    Patient s Reason for visit  Brayn Salazar main reason for visit: I am struggling with CPAP, want to look into Inspire  Patient states problem(s) started: Couple of years ago  Bryan Salazar's goals for this visit: See if I qualify for Inspire           Assessment and Plan:     Summary Sleep Diagnoses & Recommendations:  Severe untreated obstructive sleep apnea-  He did not tolerate CPAP in the past.   We reviewed treatment options including weight loss, mandibular advancement device, re-trial of CPAP, MMA, UPPP and inspire therapy.   Patient elected treatment with a mandibular advancement device.   He will follow up with me once the device is fabricated so that we can re-evaluate sleep apnea.     Comorbid Diagnoses:  Insomnia  Morbid obesity  HTN      Summary Recommendations:  No orders of the defined types were placed in this encounter.        Summary Counseling:    Obstructive Sleep Apnea Reviewed  Complications of Untreated Sleep Apnea Reviewed      Medical Decision-making:   Educational materials provided in instructions    Total time spent reviewing medical records, history and physical examination, review of previous testing and interpretation as well as documentation on this date:50 minutes    CC: Jovani Montesinos          History of Present Illness:     Past Sleep Evaluations:  Patient had a home sleep study at [facility] on 1/16/2021 (259#)-44, RDI 52, lowest oxygen saturation was 72 (time spent below 88% was 28 minutes). His main concern at the time of testing. was snoring and  daytime fatigue.    He was diagnosed with severe sleep apnea and auto-CPAP provided. He reports that he could never get used to having a mask on during sleep.     SLEEP-WAKE SCHEDULE:     Work/School Days: Patient goes to school/work: Yes   Usually gets into bed at 7am  Takes patient about 15 minutes to fall asleep  Has trouble falling asleep 2 nights per week  Wakes up in the middle of the night 3 times.  Wakes up due to Use the bathroom  He has trouble falling back asleep 0 times a week.   It usually takes 10 minutes to get back to sleep  Patient is usually up at Varies  Uses alarm: Yes    Weekends/Non-work Days/All Other Days:  Usually gets into bed at 3 am   Takes patient about 10 minutes to fall asleep  Patient is usually up at 11 am  Uses alarm: Yes    Sleep Need  Patient gets  7 hours sleep on average   Patient thinks he needs about 7-8 hours sleep    Bryan Salazar prefers to sleep in this position(s): Side   Patient states they do the following activities in bed: Use phone, computer, or tablet    Naps  Patient takes a purposeful nap 5 times a week and naps are usually 30-45 Minutes in duration  He feels better after a nap: Yes  He dozes off unintentionally 0 days per week  Patient has had a driving accident or near-miss due to sleepiness/drowsiness: No      SLEEP DISRUPTIONS:    Breathing/Snoring  Patient snores:Yes  Other people complain about his snoring: Yes  Patient has been told he stops breathing in his sleep:Yes  He has issues with the following: Morning mouth dryness;Stuffy nose when you wake up;Getting up to urinate more than once    Movement:  Patient gets pain, discomfort, with an urge to move:  No  It happens when he is resting:  No  It happens more at night:     Patient has been told he kicks his legs at night:  Yes     Behaviors in Sleep:  Bryan Salazar has experienced the following behaviors while sleeping: Kicking or punching  He has experienced sudden muscle weakness during the day: No      Is there  anything else you would like your sleep provider to know:      CAFFEINE AND OTHER SUBSTANCES:    Patient consumes caffeinated beverages per day:  2  Last caffeine use is usually: Varies with sleep schedule  List of any prescribed or over the counter stimulants that patient takes: Adderrall  List of any prescribed or over the counter sleep medication patient takes: Trazadone  List of previous sleep medications that patient has tried: Trazadone  Patient drinks alcohol to help them sleep: No  Patient drinks alcohol near bedtime: No    Family History:  Patient has a family member been diagnosed with a sleep disorder: Yes  Mother         SCALES:    EPWORTH SLEEPINESS SCALE      Huntley Sleepiness Scale ( VANESSA Zayas  4707-9808<br>ESS - [place]/English - Final version - 21 Nov 07 - [facility] [place].) 1/13/2023   Sitting and reading Would never doze   Watching TV Would never doze   Sitting, inactive in a public place (e.g. a theatre or a meeting) Would never doze   As a passenger in a car for an hour without a break Would never doze   Lying down to rest in the afternoon when circumstances permit Slight chance of dozing   Sitting and talking to someone Would never doze   Sitting quietly after a lunch without alcohol Would never doze   In a car, while stopped for a few minutes in traffic Would never doze   Huntley Score (MC) 1   Huntley Score (Sleep) 1         INSOMNIA SEVERITY INDEX (RODOLFO)      Insomnia Severity Index (RODOLFO) 1/13/2023   Difficulty falling asleep 0   Difficulty staying asleep 1   Problems waking up too early 0   How SATISFIED/DISSATISFIED are you with your CURRENT sleep pattern? 2   How NOTICEABLE to others do you think your sleep problem is in terms of impairing the quality of your life? 1   How WORRIED/DISTRESSED are you about your current sleep problem? 2   To what extent do you consider your sleep problem to INTERFERE with your daily functioning (e.g. daytime fatigue, mood, ability to function at  "work/daily chores, concentration, memory, mood, etc.) CURRENTLY? 1   RODOLFO Total Score 7       Guidelines for Scoring/Interpretation:  Total score categories:  0-7 = No clinically significant insomnia   8-14 = Subthreshold insomnia   15-21 = Clinical insomnia (moderate severity)  22-28 = Clinical insomnia (severe)  Used via courtesy of www.WebPesadosealth.va.gov with permission from Toy Hughes PhD., [facility]      STOP BANG     STOP BANG Questionnaire (  2008, the American Society of Anesthesiologists, Inc. [employer] 1/20/2023   1. Snoring - Do you snore loudly (louder than talking or loud enough to be heard through closed doors)? -   2. Tired - Do you often feel tired, fatigued, or sleepy during daytime? -   3. Observed - Has anyone observed you stop breathing during your sleep? -   4. Blood pressure - Do you have or are you being treated for high blood pressure? -   5. BMI - BMI more than 35 kg/m2? -   6. Age - Age over 50 yr old? -   7. Neck circumference - Neck circumference greater than 40 cm? -   8. Gender - Gender male? -   STOP BANG Score (MC): -   Neck Cir (cm) Clinic: 44   B/P Clinic: 119/80   BMI Clinic: 35.29         GAD7    No flowsheet data found.      CAGE-AID    No flowsheet data found.    CAGE-AID reprinted with permission from the [place] Medical Journal, LIMA Cardozo. and AURORA Bhat, \"Conjoint screening questionnaires for alcohol and drug abuse\" [place] Medical Journal 94: 135-140, 1995.      PATIENT HEALTH QUESTIONNAIRE-9 (PHQ - 9)    PHQ-9 (Pfizer) 1/18/2023   1.  Little interest or pleasure in doing things 0   2.  Feeling down, depressed, or hopeless 0   3.  Trouble falling or staying asleep, or sleeping too much 1   4.  Feeling tired or having little energy 1   5.  Poor appetite or overeating 0   6.  Feeling bad about yourself 0   7.  Trouble concentrating 0   8.  Moving slowly or restless 0   9.  Suicidal or self-harm thoughts 0   PHQ-9 Total Score 2 "   Difficulty at work, home, or with people Not difficult at all       Developed by Jim Sanchez, Joana Evans, Preet Garcias and colleagues, with an educational keyla from [employer] Inc. No permission required to reproduce, translate, display or distribute.        Allergies:    Allergies   Allergen Reactions     Doxycycline Anaphylaxis       Medications:    Current Outpatient Medications   Medication Sig Dispense Refill     [START ON 1/26/2023] amphetamine-dextroamphetamine (ADDERALL XR) 20 MG 24 hr capsule Take 1 capsule (20 mg) by mouth daily for 30 days 30 capsule 0     cyclobenzaprine (FLEXERIL) 5 MG tablet Take 1-2 tablets (5-10 mg) by mouth 3 times daily as needed for muscle spasms 90 tablet 3     emtricitabine-tenofovir (TRUVADA) 200-300 MG per tablet Take 1 tablet by mouth daily 90 tablet 0     famotidine (PEPCID) 40 MG tablet TAKE ONE TABLET BY MOUTH TWICE A  tablet 0     finasteride (PROPECIA) 1 MG tablet Take 1 tablet (1 mg) by mouth daily 90 tablet 3     fluticasone (FLONASE) 50 MCG/ACT nasal spray Spray 1 spray into both nostrils daily 16 g 11     lisinopril (ZESTRIL) 5 MG tablet Take 1 tablet (5 mg) by mouth daily 90 tablet 3     sertraline (ZOLOFT) 25 MG tablet Take 1 tablet (25 mg) by mouth daily 90 tablet 1     tadalafil (CIALIS) 5 MG tablet Take 1 tablet (5 mg) by mouth every 24 hours 90 tablet 3     testosterone cypionate (DEPOTESTOSTERONE) 200 MG/ML injection Inject 1 mL (200 mg) into the muscle once a week       traZODone (DESYREL) 50 MG tablet Take 1-2 tablets ( mg) by mouth At Bedtime 180 tablet 3       Problem List:  Patient Active Problem List    Diagnosis Date Noted     Seasonal affective disorder (H) 01/18/2023     Priority: Medium     ADHD (attention deficit hyperactivity disorder), combined type 05/26/2022     Priority: Medium     PrEP Candidate 06/08/2021     Priority: Medium     Essential hypertension 06/08/2021     Priority: Medium     BRENDA (obstructive sleep  apnea) 2021     Priority: Medium     Hypogonadism male 2021     Priority: Medium     History of latent syphilis 2021     Priority: Medium     Documented treatment in 2017.  2017 RPR 1: 16.  2018 RPR 1:4.  Now serofast at 1: 2.           Past Medical/Surgical History:  Past Medical History:   Diagnosis Date     Hypertension      Past Surgical History:   Procedure Laterality Date     NO HISTORY OF SURGERY         Social History:  Social History     Socioeconomic History     Marital status: Single     Spouse name: Not on file     Number of children: Not on file     Years of education: Not on file     Highest education level: Not on file   Occupational History     Not on file   Tobacco Use     Smoking status: Former     Types: Cigarettes     Quit date: 2018     Years since quittin.0     Smokeless tobacco: Never   Vaping Use     Vaping Use: Never used   Substance and Sexual Activity     Alcohol use: Yes     Comment: 1-2 drinks a month     Drug use: Not Currently     Sexual activity: Yes     Partners: Male   Other Topics Concern     Not on file   Social History Narrative    Exec Assistant- working remote for company in [place]      Social Determinants of Health     Financial Resource Strain: Not on file   Food Insecurity: Not on file   Transportation Needs: Not on file   Physical Activity: Not on file   Stress: Not on file   Social Connections: Not on file   Intimate Partner Violence: Not on file   Housing Stability: Not on file       Family History:  Family History   Problem Relation Age of Onset     Hypertension Father      Hyperlipidemia Father      Arthritis Maternal Grandmother      Lung Cancer Maternal Grandfather      Arthritis Maternal Grandfather      Arthritis Paternal Grandmother      Prostate Cancer Paternal Grandfather      Arthritis Paternal Grandfather      Arthritis Sister      Diabetes Paternal Uncle      Coronary Artery Disease No family hx of      Heart  "Disease No family hx of      Kidney Disease No family hx of      Asthma No family hx of      Thrombosis No family hx of      Thyroid Disease No family hx of        Review of Systems:  A complete review of systems reviewed by me is negative with the exeption of what has been mentioned in the history of present illness.  In the last TWO WEEKS have you experienced any of the following symptoms?  Fevers: No  Night Sweats: No  Weight Gain: No  Pain at Night: No  Double Vision: No  Changes in Vision: No  Difficulty Breathing through Nose: Yes  Sore Throat in Morning: No  Dry Mouth in the Morning: Yes  Shortness of Breath Lying Flat: No  Shortness of Breath With Activity: No  Awakening with Shortness of Breath: No  Increased Cough: No  Heart Racing at Night: No  Swelling in Feet or Legs: No  Diarrhea at Night: No  Heartburn at Night: No  Urinating More than Once at Night: Yes  Losing Control of Urine at Night: No  Joint Pains at Night: No  Headaches in Morning: No  Weakness in Arms or Legs: No  Depressed Mood: No  Anxiety: No     Physical Examination:  Vitals: /80   Pulse 86   Ht 1.803 m (5' 11\")   Wt 114.8 kg (253 lb)   SpO2 97%   BMI 35.29 kg/m    BMI= Body mass index is 35.29 kg/m .    Neck Cir (cm): 44 cm               Data: All pertinent previous laboratory data reviewed     Recent Labs   Lab Test 06/24/22  1540 06/11/21  0659    136   POTASSIUM 3.9 4.1   CHLORIDE 103 105   CO2 28 26   ANIONGAP 7 5   GLC 89 80   BUN 22 20   CR 0.92 0.89   HE 8.7 8.9       Recent Labs   Lab Test 07/01/22  1651   WBC 9.3   RBC 5.76   HGB 16.7   HCT 49.9   MCV 87   MCH 29.0   MCHC 33.5   RDW 13.8          Recent Labs   Lab Test 06/24/22  1540   PROTTOTAL 7.5   ALBUMIN 3.6   BILITOTAL 0.3   ALKPHOS 44   AST 56*   ALT 80*           JOYCE Michael 1/20/2023         "

## 2023-01-20 NOTE — NURSING NOTE
"Chief Complaint   Patient presents with     Sleep Problem     Hx of cpap usage but had difficulties with it. Would like to talk about Inspire.        Initial /80   Pulse 86   Ht 1.803 m (5' 11\")   Wt 114.8 kg (253 lb)   SpO2 97%   BMI 35.29 kg/m   Estimated body mass index is 35.29 kg/m  as calculated from the following:    Height as of this encounter: 1.803 m (5' 11\").    Weight as of this encounter: 114.8 kg (253 lb).    Medication Reconciliation: complete    Neck circumference:  44 centimeters.  Denise Siddiqui CMA on 1/20/2023 at 8:54 AM       "

## 2023-01-20 NOTE — PATIENT INSTRUCTIONS
[facility]  Search engine: [email or web address].aadsm.org/members/directory/search_bootstrap.php?org_id=[email or web address]&   Certified in Dental Sleep Medicine    Ruddy Herron  Degree: DDS  [address]  [address]  [place] [address]  Professional Phone: [phone]  Website: [email or web address]    Rip Echavarria  Degree: DDS  [facility]  [address]  [address]  [place] [address]  Professional Phone: [phone]: [phone]    Eunice Jimenez  [facility]  [address]  [place] [address]  Professional Phone: [phone]  Website: [email or web address]      Kentrell Henry  Degree: DDS  [address]  [address]  [place] [address]  Professional Phone: [phone]  Fax: [phone]    Rob Erickson  Degree: DDS  [facility]  [address]  [place] [address]  Professional Phone: [phone]  Website: [email or web address]      [facility]  [facility]  [address]  [address]  [place] [address]  Professional Phone: [phone]  Website: [email or web address]      Rosy Larios  Degree: DDS  [facility]  [address]  [address]  [place] [address]  Professional Phone: [phone]     Annabelle Muniz  Degree: DDS, PhD  [facility]  [address] [place] [address] [id number] [place] [address]   [address],   [address]   [place] [address]   Appointments: [phone]   Fax: [phone]   [facility]   [address]   [address]   [place] [address]   Appointments: [phone]   Fax: [phone]                Saulo Whyte  Degree: DDS  [address] [place] [address]  Professional Phone: [phone]  Fax: [phone]  Website: [email or web address]      Franflora Patel  Degree: JEOVANY  [facility]  [address] [place] [address]    Teresaona Mulet Pradera  Degree: MS [place] [facility]ER TMD, Oral Medicine, [facility]  [address] [place] [address]  Professional Phone: [phone]      Nubia Herrera  Degree: MS JEOVANY  [facility]  [address]  [address]  [place] [address]  Professional Phone: [phone]    Kimberly Toribio  Degree: JEOVANY  [facility]  [address]  [address]  [place] [address]  [place] Office     Hesham Kolb  Degree: JEOVANY  [facility]  [address] [place] [address]  Professional Phone: [phone]  Website: [email or web address]      Dani Ko  Degree: JEOVANY  [facility]  [address] [place] [address]  Professional Phone: [phone]  Fax: [phone]      Santy Truong  Degree: JEOVANY [facility]  [address]  [address]  [place] [address]                 ACCEPT MEDICARE  Ramez Mccormack DDS  [address], [place] [address]  [phone]; [phone] (fax)  rateGenius    Rafal Harris DDS, MS   [facility]   [address]   [address]   [place] [address]   Appointments: [phone]   Fax: [phone]     Marshall Whyte DDS   [address]   [place] [address]  Appointments [phone]      ADDITIONAL PROVIDERS    Sree Piedra DDS   [facility]   [address],   [address]   [place] [address]   Appointments: [phone]   Fax: [phone]       Gopal Doan DDS, MS [facility]  [address] [place] [address] [address]  [place] [address]   Appointments: [phone] Ext: [phone]  Fax: [phone]   [email or web address]

## 2023-01-26 DIAGNOSIS — K21.9 GASTROESOPHAGEAL REFLUX DISEASE, UNSPECIFIED WHETHER ESOPHAGITIS PRESENT: ICD-10-CM

## 2023-01-30 RX ORDER — FAMOTIDINE 40 MG/1
TABLET, FILM COATED ORAL
Qty: 180 TABLET | Refills: 0 | Status: SHIPPED | OUTPATIENT
Start: 2023-01-30 | End: 2023-04-20

## 2023-01-30 NOTE — TELEPHONE ENCOUNTER
"Request for medication refill: famotidine (PEPCID) 40 MG tablet    Providers if patient needs an appointment and you are willing to give a one month supply please refill for one month and  send a letter/MyChart using \".SMILLIMITEDREFILL\" .smillimited and route chart to \"[facility] \" (Giving one month refill in non controlled medications is strongly recommended before denial)    If refill has been denied, meaning absolutely no refills without visit, please complete the smart phrase \".smirxrefuse\" and route it to the \"[facility] MED REFILLS\"  pool to inform the patient and the pharmacy.    Christine León, CMA      "

## 2023-02-02 ENCOUNTER — MYC REFILL (OUTPATIENT)
Dept: FAMILY MEDICINE | Facility: CLINIC | Age: 43
End: 2023-02-02

## 2023-02-02 DIAGNOSIS — F90.2 ADHD (ATTENTION DEFICIT HYPERACTIVITY DISORDER), COMBINED TYPE: ICD-10-CM

## 2023-02-02 DIAGNOSIS — Z20.6 CONTACT WITH AND (SUSPECTED) EXPOSURE TO HUMAN IMMUNODEFICIENCY VIRUS (HIV): ICD-10-CM

## 2023-02-02 RX ORDER — EMTRICITABINE AND TENOFOVIR DISOPROXIL FUMARATE 200; 300 MG/1; MG/1
1 TABLET, FILM COATED ORAL DAILY
Qty: 90 TABLET | Refills: 0 | Status: SHIPPED | OUTPATIENT
Start: 2023-02-02 | End: 2023-03-08

## 2023-02-02 RX ORDER — DEXTROAMPHETAMINE SACCHARATE, AMPHETAMINE ASPARTATE MONOHYDRATE, DEXTROAMPHETAMINE SULFATE AND AMPHETAMINE SULFATE 5; 5; 5; 5 MG/1; MG/1; MG/1; MG/1
20 CAPSULE, EXTENDED RELEASE ORAL DAILY
Qty: 30 CAPSULE | Refills: 0 | Status: SHIPPED | OUTPATIENT
Start: 2023-02-02 | End: 2023-02-09

## 2023-02-02 NOTE — TELEPHONE ENCOUNTER
"Request for medication refill: Adderall    Prescription needs to be sent to new location. Patient cancelled last order at [place] Location.     Providers if patient needs an appointment and you are willing to give a one month supply please refill for one month and  send a letter/MyChart using \".SMILLIMITEDREFILL\" .smillimited and route chart to \"P [facility] \" (Giving one month refill in non controlled medications is strongly recommended before denial)    If refill has been denied, meaning absolutely no refills without visit, please complete the smart phrase \".smirxrefuse\" and route it to the \"P [facility] MED REFILLS\"  pool to inform the patient and the pharmacy.    Nunu Hall RN        "

## 2023-02-02 NOTE — TELEPHONE ENCOUNTER
"Last seen 1/18/23    Request for medication refill:  emtricitabine-tenofovir (TRUVADA) 200-300 MG per tablet  Providers if patient needs an appointment and you are willing to give a one month supply please refill for one month and  send a letter/MyChart using \".SMILLIMITEDREFILL\" .smillimited and route chart to \"P [facility] \" (Giving one month refill in non controlled medications is strongly recommended before denial)    If refill has been denied, meaning absolutely no refills without visit, please complete the smart phrase \".smirxrefuse\" and route it to the \"P [facility] MED REFILLS\"  pool to inform the patient and the pharmacy.    Laisha Archer RN        "

## 2023-02-09 ENCOUNTER — MYC MEDICAL ADVICE (OUTPATIENT)
Dept: FAMILY MEDICINE | Facility: CLINIC | Age: 43
End: 2023-02-09

## 2023-02-09 DIAGNOSIS — F90.2 ADHD (ATTENTION DEFICIT HYPERACTIVITY DISORDER), COMBINED TYPE: Primary | Chronic | ICD-10-CM

## 2023-02-09 RX ORDER — LISDEXAMFETAMINE DIMESYLATE 30 MG/1
30 CAPSULE ORAL EVERY MORNING
Qty: 30 CAPSULE | Refills: 0 | Status: SHIPPED | OUTPATIENT
Start: 2023-02-09 | End: 2023-03-10

## 2023-02-09 NOTE — TELEPHONE ENCOUNTER
Diagnoses and all orders for this visit:    ADHD (attention deficit hyperactivity disorder), combined type  -     lisdexamfetamine (VYVANSE) 30 MG capsule; Take 1 capsule (30 mg) by mouth every morning      Adderall on shortage nationally. Will trial vyvanse.    Jovani Montesinos DO

## 2023-02-13 ENCOUNTER — E-VISIT (OUTPATIENT)
Dept: URGENT CARE | Facility: CLINIC | Age: 43
End: 2023-02-13
Payer: COMMERCIAL

## 2023-02-13 DIAGNOSIS — H57.12 PAIN OF LEFT EYE: Primary | ICD-10-CM

## 2023-02-13 PROCEDURE — 99207 PR NON-BILLABLE SERV PER CHARTING: CPT | Performed by: PHYSICIAN ASSISTANT

## 2023-02-14 ENCOUNTER — OFFICE VISIT (OUTPATIENT)
Dept: FAMILY MEDICINE | Facility: CLINIC | Age: 43
End: 2023-02-14
Payer: COMMERCIAL

## 2023-02-14 VITALS
SYSTOLIC BLOOD PRESSURE: 127 MMHG | BODY MASS INDEX: 35.29 KG/M2 | DIASTOLIC BLOOD PRESSURE: 76 MMHG | OXYGEN SATURATION: 96 % | HEART RATE: 110 BPM | TEMPERATURE: 98.2 F | RESPIRATION RATE: 18 BRPM | WEIGHT: 253.04 LBS

## 2023-02-14 DIAGNOSIS — B30.9 VIRAL CONJUNCTIVITIS OF BOTH EYES: Primary | ICD-10-CM

## 2023-02-14 DIAGNOSIS — J30.2 SEASONAL ALLERGIC RHINITIS, UNSPECIFIED TRIGGER: ICD-10-CM

## 2023-02-14 PROCEDURE — 99214 OFFICE O/P EST MOD 30 MIN: CPT | Performed by: NURSE PRACTITIONER

## 2023-02-14 ASSESSMENT — ENCOUNTER SYMPTOMS: EYE PAIN: 1

## 2023-02-14 NOTE — PROGRESS NOTES
"  Assessment & Plan     Viral conjunctivitis of both eyes  Seasonal allergic rhinitis, unspecified trigger  Viral vs bacterial conjunctivitis given no response to polymyxin drops; symptoms consistent w/allergic conjunctivitis  - start antihistamine OTC Claritin, zyrtec   - start Flonase  - OTC allergy eye drops  - warm/cool compress  - follow up in 1-2 weeks if no improvement; will place referral to eye clinic       BMI:   Estimated body mass index is 35.29 kg/m  as calculated from the following:    Height as of 1/20/23: 1.803 m (5' 11\").    Weight as of this encounter: 114.8 kg (253 lb 0.6 oz).           No follow-ups on file.    CEDRICK Friend CNP  [facility]    Shana Adams is a 42 year old accompanied by his Self, presenting for the following health issues:  Eye Problem    - eye infection treated on 2/11/2023 with polymyxin through telehealth provider; no improvement now spreading to right; C/o clear drainage; wakes with stickiness; and grittiness; tried warm compression; + itchiness; redness; denies crusting     Eye Problem     History of Present Illness       Reason for visit:  Eye Infection  Symptom onset:  3-7 days ago  Symptoms include:  Redness, drainage, itching, runny nose, irritation  Symptom intensity:  Moderate  Symptom progression:  Staying the same  Had these symptoms before:  No    He eats 2-3 servings of fruits and vegetables daily.He consumes 0 sweetened beverage(s) daily.He exercises with enough effort to increase his heart rate 30 to 60 minutes per day.  He exercises with enough effort to increase his heart rate 5 days per week.   He is taking medications regularly.       Review of Systems   Eyes: Positive for pain.            Objective    /76 (BP Location: Right arm, Patient Position: Sitting, Cuff Size: Adult Large)   Pulse 110   Temp 98.2  F (36.8  C) (Temporal)   Resp 18   Wt 114.8 kg (253 lb 0.6 oz)   SpO2 96%   BMI 35.29 kg/m    Body mass " index is 35.29 kg/m .  Physical Exam   GENERAL: healthy, alert and no distress  EYES: conjunctiva/corneas- conjunctival injection OU  NECK: no adenopathy, no asymmetry, masses, or scars and thyroid normal to palpation  RESP: lungs clear to auscultation - no rales, rhonchi or wheezes  CV: regular rate and rhythm, normal S1 S2, no S3 or S4, no murmur, click or rub, no peripheral edema and peripheral pulses strong    No results found for any visits on 02/14/23.

## 2023-02-14 NOTE — PATIENT INSTRUCTIONS
Dear Bryan Salazar,    We are sorry you are not feeling well. Based on the responses you provided, it is recommended that you be seen in-person in urgent care so we can better evaluate your symptoms. Please click here to find the nearest urgent care location to you.   You will not be charged for this Visit. Thank you for trusting us with your care.    Carlitos Hilton PA-C

## 2023-03-03 DIAGNOSIS — Z20.6 CONTACT WITH AND (SUSPECTED) EXPOSURE TO HUMAN IMMUNODEFICIENCY VIRUS (HIV): ICD-10-CM

## 2023-03-08 RX ORDER — EMTRICITABINE AND TENOFOVIR DISOPROXIL FUMARATE 200; 300 MG/1; MG/1
TABLET, FILM COATED ORAL
Qty: 90 TABLET | Refills: 0 | Status: SHIPPED | OUTPATIENT
Start: 2023-03-08 | End: 2023-03-13

## 2023-03-08 NOTE — TELEPHONE ENCOUNTER
"Request for medication refill: emtricitabine-tenofovir (TRUVADA) 200-300 MG per tablet    Providers if patient needs an appointment and you are willing to give a one month supply please refill for one month and  send a letter/MyChart using \".SMILLIMITEDREFILL\" .smillimited and route chart to \"P [facility] \" (Giving one month refill in non controlled medications is strongly recommended before denial)    If refill has been denied, meaning absolutely no refills without visit, please complete the smart phrase \".smirxrefuse\" and route it to the \"P [facility] MED REFILLS\"  pool to inform the patient and the pharmacy.    Christine León, CMA      "

## 2023-03-10 ENCOUNTER — MYC REFILL (OUTPATIENT)
Dept: FAMILY MEDICINE | Facility: CLINIC | Age: 43
End: 2023-03-10

## 2023-03-10 DIAGNOSIS — F90.2 ADHD (ATTENTION DEFICIT HYPERACTIVITY DISORDER), COMBINED TYPE: Chronic | ICD-10-CM

## 2023-03-10 RX ORDER — LISDEXAMFETAMINE DIMESYLATE 30 MG/1
30 CAPSULE ORAL EVERY MORNING
Qty: 30 CAPSULE | Refills: 0 | Status: SHIPPED | OUTPATIENT
Start: 2023-03-10 | End: 2023-04-03 | Stop reason: DRUGHIGH

## 2023-03-10 NOTE — TELEPHONE ENCOUNTER
"Last seen 1/18/2023    Request for medication refill:  lisdexamfetamine (VYVANSE) 30 MG capsule  Providers if patient needs an appointment and you are willing to give a one month supply please refill for one month and  send a letter/MyChart using \".SMILLIMITEDREFILL\" .smillimited and route chart to \"P [facility] \" (Giving one month refill in non controlled medications is strongly recommended before denial)    If refill has been denied, meaning absolutely no refills without visit, please complete the smart phrase \".smirxrefuse\" and route it to the \"P [facility] MED REFILLS\"  pool to inform the patient and the pharmacy.    Laisha Archer RN        "

## 2023-03-13 DIAGNOSIS — Z20.6 CONTACT WITH AND (SUSPECTED) EXPOSURE TO HUMAN IMMUNODEFICIENCY VIRUS (HIV): ICD-10-CM

## 2023-03-13 RX ORDER — EMTRICITABINE AND TENOFOVIR DISOPROXIL FUMARATE 200; 300 MG/1; MG/1
TABLET, FILM COATED ORAL
Qty: 30 TABLET | Refills: 2 | Status: SHIPPED | OUTPATIENT
Start: 2023-03-13 | End: 2023-04-21

## 2023-04-05 ENCOUNTER — MYC MEDICAL ADVICE (OUTPATIENT)
Dept: FAMILY MEDICINE | Facility: CLINIC | Age: 43
End: 2023-04-05
Payer: COMMERCIAL

## 2023-04-05 DIAGNOSIS — F33.8 SEASONAL AFFECTIVE DISORDER (H): ICD-10-CM

## 2023-04-06 NOTE — TELEPHONE ENCOUNTER
Diagnoses and all orders for this visit:    Seasonal affective disorder (H)  -     sertraline (ZOLOFT) 50 MG tablet; Take 1 tablet (50 mg) by mouth daily      Dose increased form 25 mg to 50 mg    Jovani Montesinos DO

## 2023-04-20 DIAGNOSIS — K21.9 GASTROESOPHAGEAL REFLUX DISEASE, UNSPECIFIED WHETHER ESOPHAGITIS PRESENT: ICD-10-CM

## 2023-04-20 RX ORDER — FAMOTIDINE 40 MG/1
TABLET, FILM COATED ORAL
Qty: 180 TABLET | Refills: 0 | Status: SHIPPED | OUTPATIENT
Start: 2023-04-20 | End: 2023-04-21

## 2023-04-20 ASSESSMENT — ENCOUNTER SYMPTOMS
HEMATURIA: 0
DIARRHEA: 0
SHORTNESS OF BREATH: 0
FREQUENCY: 0
PARESTHESIAS: 0
HEMATOCHEZIA: 0
DYSURIA: 0
COUGH: 0
SORE THROAT: 0
CHILLS: 0
NERVOUS/ANXIOUS: 0
DIZZINESS: 0
FEVER: 0
ABDOMINAL PAIN: 0
WEAKNESS: 0
HEARTBURN: 0
CONSTIPATION: 0
NAUSEA: 0
JOINT SWELLING: 0
HEADACHES: 0
EYE PAIN: 0
MYALGIAS: 0
ARTHRALGIAS: 0
PALPITATIONS: 0

## 2023-04-20 NOTE — TELEPHONE ENCOUNTER
"Request for medication refill:  : famotidine (PEPCID) 40 MG tablet  Providers if patient needs an appointment and you are willing to give a one month supply please refill for one month and  send a letter/MyChart using \".SMILLIMITEDREFILL\" .smillimited and route chart to \"P [facility] \" (Giving one month refill in non controlled medications is strongly recommended before denial)    If refill has been denied, meaning absolutely no refills without visit, please complete the smart phrase \".smirxrefuse\" and route it to the \"P [facility] MED REFILLS\"  pool to inform the patient and the pharmacy.    Roxie Lopez      "

## 2023-04-21 ENCOUNTER — OFFICE VISIT (OUTPATIENT)
Dept: FAMILY MEDICINE | Facility: CLINIC | Age: 43
End: 2023-04-21
Payer: COMMERCIAL

## 2023-04-21 VITALS
SYSTOLIC BLOOD PRESSURE: 130 MMHG | HEIGHT: 71 IN | OXYGEN SATURATION: 97 % | RESPIRATION RATE: 18 BRPM | WEIGHT: 258.9 LBS | HEART RATE: 83 BPM | DIASTOLIC BLOOD PRESSURE: 86 MMHG | BODY MASS INDEX: 36.24 KG/M2

## 2023-04-21 DIAGNOSIS — I10 ESSENTIAL HYPERTENSION: ICD-10-CM

## 2023-04-21 DIAGNOSIS — N52.9 ERECTILE DYSFUNCTION, UNSPECIFIED ERECTILE DYSFUNCTION TYPE: ICD-10-CM

## 2023-04-21 DIAGNOSIS — L65.9 HAIR LOSS: ICD-10-CM

## 2023-04-21 DIAGNOSIS — G47.00 INSOMNIA, UNSPECIFIED TYPE: ICD-10-CM

## 2023-04-21 DIAGNOSIS — Z11.3 ROUTINE SCREENING FOR STI (SEXUALLY TRANSMITTED INFECTION): ICD-10-CM

## 2023-04-21 DIAGNOSIS — S39.012A STRAIN OF LUMBAR REGION, INITIAL ENCOUNTER: ICD-10-CM

## 2023-04-21 DIAGNOSIS — Z20.6 CONTACT WITH AND (SUSPECTED) EXPOSURE TO HUMAN IMMUNODEFICIENCY VIRUS (HIV): ICD-10-CM

## 2023-04-21 DIAGNOSIS — Z00.00 ANNUAL PHYSICAL EXAM: Primary | ICD-10-CM

## 2023-04-21 DIAGNOSIS — K21.9 GASTROESOPHAGEAL REFLUX DISEASE, UNSPECIFIED WHETHER ESOPHAGITIS PRESENT: ICD-10-CM

## 2023-04-21 DIAGNOSIS — Z11.4 SCREENING FOR HIV (HUMAN IMMUNODEFICIENCY VIRUS): ICD-10-CM

## 2023-04-21 DIAGNOSIS — J30.2 SEASONAL ALLERGIC RHINITIS, UNSPECIFIED TRIGGER: ICD-10-CM

## 2023-04-21 DIAGNOSIS — Z23 NEED FOR VACCINATION: ICD-10-CM

## 2023-04-21 DIAGNOSIS — R74.01 ELEVATED LIVER TRANSAMINASE LEVEL: ICD-10-CM

## 2023-04-21 DIAGNOSIS — F33.8 SEASONAL AFFECTIVE DISORDER (H): ICD-10-CM

## 2023-04-21 LAB
ALBUMIN SERPL BCG-MCNC: 4.3 G/DL (ref 3.5–5.2)
ALP SERPL-CCNC: 56 U/L (ref 40–129)
ALT SERPL W P-5'-P-CCNC: 77 U/L (ref 10–50)
ANION GAP SERPL CALCULATED.3IONS-SCNC: 11 MMOL/L (ref 7–15)
AST SERPL W P-5'-P-CCNC: 69 U/L (ref 10–50)
BILIRUB SERPL-MCNC: 0.4 MG/DL
BUN SERPL-MCNC: 9.4 MG/DL (ref 6–20)
CALCIUM SERPL-MCNC: 9.5 MG/DL (ref 8.6–10)
CHLORIDE SERPL-SCNC: 102 MMOL/L (ref 98–107)
CREAT SERPL-MCNC: 0.97 MG/DL (ref 0.67–1.17)
DEPRECATED HCO3 PLAS-SCNC: 25 MMOL/L (ref 22–29)
GFR SERPL CREATININE-BSD FRML MDRD: >90 ML/MIN/1.73M2
GLUCOSE SERPL-MCNC: 100 MG/DL (ref 70–99)
POTASSIUM SERPL-SCNC: 4.1 MMOL/L (ref 3.4–5.3)
PROT SERPL-MCNC: 7.5 G/DL (ref 6.4–8.3)
SODIUM SERPL-SCNC: 138 MMOL/L (ref 136–145)

## 2023-04-21 PROCEDURE — 99396 PREV VISIT EST AGE 40-64: CPT | Mod: 25 | Performed by: STUDENT IN AN ORGANIZED HEALTH CARE EDUCATION/TRAINING PROGRAM

## 2023-04-21 PROCEDURE — 86592 SYPHILIS TEST NON-TREP QUAL: CPT | Performed by: STUDENT IN AN ORGANIZED HEALTH CARE EDUCATION/TRAINING PROGRAM

## 2023-04-21 PROCEDURE — 90632 HEPA VACCINE ADULT IM: CPT | Performed by: STUDENT IN AN ORGANIZED HEALTH CARE EDUCATION/TRAINING PROGRAM

## 2023-04-21 PROCEDURE — 90472 IMMUNIZATION ADMIN EACH ADD: CPT | Performed by: STUDENT IN AN ORGANIZED HEALTH CARE EDUCATION/TRAINING PROGRAM

## 2023-04-21 PROCEDURE — 87591 N.GONORRHOEAE DNA AMP PROB: CPT | Performed by: STUDENT IN AN ORGANIZED HEALTH CARE EDUCATION/TRAINING PROGRAM

## 2023-04-21 PROCEDURE — 36415 COLL VENOUS BLD VENIPUNCTURE: CPT | Performed by: STUDENT IN AN ORGANIZED HEALTH CARE EDUCATION/TRAINING PROGRAM

## 2023-04-21 PROCEDURE — 87491 CHLMYD TRACH DNA AMP PROBE: CPT | Performed by: STUDENT IN AN ORGANIZED HEALTH CARE EDUCATION/TRAINING PROGRAM

## 2023-04-21 PROCEDURE — 87389 HIV-1 AG W/HIV-1&-2 AB AG IA: CPT | Performed by: STUDENT IN AN ORGANIZED HEALTH CARE EDUCATION/TRAINING PROGRAM

## 2023-04-21 PROCEDURE — 90471 IMMUNIZATION ADMIN: CPT | Performed by: STUDENT IN AN ORGANIZED HEALTH CARE EDUCATION/TRAINING PROGRAM

## 2023-04-21 PROCEDURE — 80053 COMPREHEN METABOLIC PANEL: CPT | Performed by: STUDENT IN AN ORGANIZED HEALTH CARE EDUCATION/TRAINING PROGRAM

## 2023-04-21 PROCEDURE — 86593 SYPHILIS TEST NON-TREP QUANT: CPT | Performed by: STUDENT IN AN ORGANIZED HEALTH CARE EDUCATION/TRAINING PROGRAM

## 2023-04-21 PROCEDURE — 90746 HEPB VACCINE 3 DOSE ADULT IM: CPT | Performed by: STUDENT IN AN ORGANIZED HEALTH CARE EDUCATION/TRAINING PROGRAM

## 2023-04-21 RX ORDER — FINASTERIDE 1 MG/1
1 TABLET, FILM COATED ORAL DAILY
Qty: 90 TABLET | Refills: 3 | Status: SHIPPED | OUTPATIENT
Start: 2023-04-21 | End: 2023-10-31

## 2023-04-21 RX ORDER — TADALAFIL 5 MG/1
5 TABLET ORAL EVERY 24 HOURS
Qty: 90 TABLET | Refills: 3 | Status: SHIPPED | OUTPATIENT
Start: 2023-04-21 | End: 2023-10-18

## 2023-04-21 RX ORDER — TRAZODONE HYDROCHLORIDE 50 MG/1
50-100 TABLET, FILM COATED ORAL AT BEDTIME
Qty: 180 TABLET | Refills: 3 | Status: SHIPPED | OUTPATIENT
Start: 2023-04-21 | End: 2023-06-26

## 2023-04-21 RX ORDER — EMTRICITABINE AND TENOFOVIR DISOPROXIL FUMARATE 200; 300 MG/1; MG/1
1 TABLET, FILM COATED ORAL DAILY
Qty: 90 TABLET | Refills: 0 | Status: CANCELLED | OUTPATIENT
Start: 2023-04-21

## 2023-04-21 RX ORDER — EMTRICITABINE AND TENOFOVIR DISOPROXIL FUMARATE 200; 300 MG/1; MG/1
1 TABLET, FILM COATED ORAL DAILY
Qty: 30 TABLET | Refills: 0 | Status: SHIPPED | OUTPATIENT
Start: 2023-04-21 | End: 2023-05-19

## 2023-04-21 RX ORDER — LISINOPRIL 5 MG/1
5 TABLET ORAL DAILY
Qty: 90 TABLET | Refills: 3 | Status: SHIPPED | OUTPATIENT
Start: 2023-04-21 | End: 2023-10-18

## 2023-04-21 RX ORDER — FLUTICASONE PROPIONATE 50 MCG
1 SPRAY, SUSPENSION (ML) NASAL DAILY
Qty: 16 G | Refills: 11 | Status: SHIPPED | OUTPATIENT
Start: 2023-04-21

## 2023-04-21 RX ORDER — CYCLOBENZAPRINE HCL 5 MG
5-10 TABLET ORAL 3 TIMES DAILY PRN
Qty: 90 TABLET | Refills: 3 | Status: SHIPPED | OUTPATIENT
Start: 2023-04-21 | End: 2024-06-20

## 2023-04-21 RX ORDER — FAMOTIDINE 40 MG/1
40 TABLET, FILM COATED ORAL 2 TIMES DAILY
Qty: 180 TABLET | Refills: 0 | Status: SHIPPED | OUTPATIENT
Start: 2023-04-21 | End: 2023-06-27

## 2023-04-21 ASSESSMENT — ENCOUNTER SYMPTOMS
ABDOMINAL PAIN: 0
DIZZINESS: 0
HEMATOCHEZIA: 0
JOINT SWELLING: 0
DIARRHEA: 0
COUGH: 0
FREQUENCY: 0
HEARTBURN: 0
SHORTNESS OF BREATH: 0
ARTHRALGIAS: 0
MYALGIAS: 0
DYSURIA: 0
HEMATURIA: 0
PARESTHESIAS: 0
NERVOUS/ANXIOUS: 0
EYE PAIN: 0
FEVER: 0
NAUSEA: 0
PALPITATIONS: 0
CHILLS: 0
HEADACHES: 0
SORE THROAT: 0
WEAKNESS: 0
CONSTIPATION: 0

## 2023-04-21 NOTE — PROGRESS NOTES
SUBJECTIVE:   CC: Bryan is an 43 year old who presents for preventative health visit.       4/21/2023    11:08 AM   Additional Questions   Roomed by Greg   Accompanied by Self   Patient has been advised of split billing requirements and indicates understanding: Yes  Healthy Habits:     Getting at least 3 servings of Calcium per day:  Yes    Bi-annual eye exam:  Yes    Dental care twice a year:  Yes    Sleep apnea or symptoms of sleep apnea:  Sleep apnea    Diet:  Regular (no restrictions)    Frequency of exercise:  4-5 days/week    Duration of exercise:  45-60 minutes    Taking medications regularly:  Yes    Medication side effects:  Not applicable    PHQ-2 Total Score: 0    Additional concerns today:  No    No specific concerns today    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)    Sleep apnea  - tries to use CPAP but hasn't been able to do it  - looking into dental appliance for sleep apnea  - pt has information for this and will call to make appointment    Diet  - tries to eat healthy  - chicken, beef, seafood  - fruits and veggies at least 2-3 servings/day  - water, diet soda 1-2 cans/day, tea, coffee 2-3cups/day    Exercise  - weight lifting and cardio at gym  - 3-4 days/week   - 1 hr    Social  - Work at [employer]  - former tobacco quit 5 years ago, 1/2pack-pack per day, on and off for 10-15 years , no current tobacco use  - rare marijuana use, smoking and edibles  - alcohol a few times a month, 2-3 drinks in a sitting when having alcohol  - [place] [facility] [place] [facility], lives with partner  - mood: pretty good  - no sexual concerns today    Meds  - flexeril 4mg prn for muscle spasms 1-2x/month  - Truvada daily  - Famotidine 40mg BID  - Finasteride 1mg daily   - Flonase  - Vyvanse 40mg  - Lisinpril 5mg daily  - Sertraline 50mg  - Cialis 5mg once/week  - Testosterone injection 200mg/mL once/week on Fridays  - Trazadone 50mg bid    Reflux  - notices  "occasionally early in morning  - x couple of years  - famotidine helps    BP  - lisinopril 5mg daily  - not regularly checking BP at home  - will check if having headaches or \"feeling off\"      Today's PHQ-2 Score:       2023     4:16 PM   PHQ-2 (  [employer])   Q1: Little interest or pleasure in doing things 0   Q2: Feeling down, depressed or hopeless 0   PHQ-2 Score 0   Q1: Little interest or pleasure in doing things Not at all   Q2: Feeling down, depressed or hopeless Not at all   PHQ-2 Score 0       Social History     Tobacco Use     Smoking status: Former     Types: Cigarettes     Quit date: 2018     Years since quittin.3     Smokeless tobacco: Never   Vaping Use     Vaping status: Never Used   Substance Use Topics     Alcohol use: Yes     Comment: 1-2 drinks a month             2023     4:16 PM   Alcohol Use   Prescreen: >3 drinks/day or >7 drinks/week? No       Last PSA: No results found for: PSA    Reviewed orders with patient. Reviewed health maintenance and updated orders accordingly - Yes      Reviewed and updated as needed this visit by clinical staff   Tobacco  Allergies  Meds  Problems  Med Hx  Surg Hx  Fam Hx          Reviewed and updated as needed this visit by Provider   Tobacco  Allergies  Meds  Problems  Med Hx  Surg Hx  Fam Hx             Review of Systems   Constitutional: Negative for chills and fever.   HENT: Negative for congestion, ear pain, hearing loss and sore throat.    Eyes: Negative for pain and visual disturbance.   Respiratory: Negative for cough and shortness of breath.    Cardiovascular: Negative for chest pain, palpitations and peripheral edema.   Gastrointestinal: Negative for abdominal pain, constipation, diarrhea, heartburn, hematochezia and nausea.   Genitourinary: Negative for dysuria, frequency, genital sores, hematuria, impotence, penile discharge and urgency.   Musculoskeletal: Negative for arthralgias, joint swelling and myalgias.   Skin: " "Negative for rash.   Neurological: Negative for dizziness, weakness, headaches and paresthesias.   Psychiatric/Behavioral: Negative for mood changes. The patient is not nervous/anxious.        OBJECTIVE:   /86   Pulse 83   Resp 18   Ht 1.803 m (5' 11\")   Wt 117.4 kg (258 lb 14.4 oz)   SpO2 97%   BMI 36.11 kg/m      Physical Exam  GENERAL: healthy, alert and no distress  EYES: Eyes grossly normal to inspection, conjunctivae and sclerae normal  HENT:normocephalic, atraumatic  RESP: lungs clear to auscultation - no rales, rhonchi or wheezes  CV: regular rate and rhythm, normal S1 S2, no S3 or S4, no murmur, click or rub, no peripheral edema and peripheral pulses strong  ABDOMEN: non-distended  RECTAL: external anal orifice normal with small skin tags, normal rectal sphincter tone  MS: no gross musculoskeletal defects noted, no edema  SKIN: no suspicious lesions or rashes  NEURO: Normal strength and tone, mentation intact and speech normal  PSYCH: mentation appears normal, affect normal/bright    Diagnostic Test Results:  Labs reviewed in Epic    ASSESSMENT/PLAN:   Bryan was seen today for physical and refill request.    Diagnoses and all orders for this visit:    Screening for HIV (human immunodeficiency virus)  Contact with and (suspected) exposure to human immunodeficiency virus (hiv)  PrEP Candidate  On Truvada, due for HIV screen.   -     HIV Antigen Antibody Combo; Future  -     emtricitabine-tenofovir (TRUVADA) 200-300 MG per tablet; Take 1 tablet by mouth daily    Routine screening for STI (sexually transmitted infection)  History of latent syphilis.   -     Chlamydia trachomatis/Neisseria gonorrhoeae by PCR - Clinic Collect; Future  -     Chlamydia trachomatis/Neisseria gonorrhoeae by PCR - Clinic Collect  -     Chlamydia trachomatis/Neisseria gonorrhoeae by PCR - Clinic Collect  -     Rapid Plasma Reagin w Rflx to TITER; Future  -     Chlamydia trachomatis/Neisseria gonorrhoeae by PCR - Clinic " Collect  -     Rapid Plasma Reagin w Rflx to TITER      Elevated liver transaminase level  Patient has had mildly elevated transaminase levels which are being monitored due to testosterone use. AST/ALT have not been double upper limits of normal so okay to continue testosterone and monitor AST/ALT.   -     Comprehensive Metabolic Panel; Future  -     Comprehensive Metabolic Panel    Strain of lumbar region, initial encounter  Uses cyclobenzaprine prn 1-2x/week. Spasms are well managed, continue current dosing.   -     cyclobenzaprine (FLEXERIL) 5 MG tablet; Take 1-2 tablets (5-10 mg) by mouth 3 times daily as needed for muscle spasms    Gastroesophageal reflux disease, unspecified whether esophagitis present  2 years of reflux symptoms. No red flag symptoms concerning for other etiology of reflux. Famotidine helps.   -     famotidine (PEPCID) 40 MG tablet; Take 1 tablet (40 mg) by mouth 2 times daily    Insomnia, unspecified type  Patient usually takes 2 tablets for sleep.   -     traZODone (DESYREL) 50 MG tablet; Take 1-2 tablets ( mg) by mouth At Bedtime    Seasonal affective disorder (H)  Patient endorsing good mood. Stable on current dose of Zoloft.   -     sertraline (ZOLOFT) 50 MG tablet; Take 1 tablet (50 mg) by mouth daily    Essential hypertension  BP good in clinic today (130/876). Not measuring BP at home, however stable on lisinopril 5mg at clinic visits. Continue current dose.   -     lisinopril (ZESTRIL) 5 MG tablet; Take 1 tablet (5 mg) by mouth daily    Erectile dysfunction, unspecified erectile dysfunction type  Patient uses 1x/week.   -     tadalafil (CIALIS) 5 MG tablet; Take 1 tablet (5 mg) by mouth every 24 hours    Hair loss  Likely secondary to testosterone use. Continue current management with finasteride.   -     finasteride (PROPECIA) 1 MG tablet; Take 1 tablet (1 mg) by mouth daily    Seasonal allergic rhinitis, unspecified trigger  -     fluticasone (FLONASE) 50 MCG/ACT nasal spray;  "Spray 1 spray into both nostrils daily    Need for vaccination  Recently joined emergency response team at work, requires HepB immunity for this. Also recommend HepA vaccine today due to pt being in MSM community.   -     HEPATITIS A VACCINE, ADULT  -     HEPATITIS B VACCINE,  ADULT (ENGERIX-B/RECOMBIVAX HB)      Patient has been advised of split billing requirements and indicates understanding: Yes    Annual physical  COUNSELING:   Reviewed preventive health counseling, as reflected in patient instructions       Regular exercise       Healthy diet/nutrition       Alcohol Use        PrEP retrovirus therapy for HIV prevention       BMI:   Estimated body mass index is 36.11 kg/m  as calculated from the following:    Height as of this encounter: 1.803 m (5' 11\").    Weight as of this encounter: 117.4 kg (258 lb 14.4 oz).   Weight management plan: Discussed healthy diet and exercise guidelines      He reports that he quit smoking about 5 years ago. He has never used smokeless tobacco.    Flavia Del Rio, MS3    I, Jovani Montesinos DO, was present with the medical student who participated in the service and in the documentation of the note.  I have verified the history and personally performed the physical exam and medical decision making, and have verified the content of the note, which accurately reflects me assessment of the plan of care as documented in the note.      Jovani Montesinos DO  [facility]  "

## 2023-04-22 LAB
C TRACH DNA SPEC QL PROBE+SIG AMP: NEGATIVE
HIV 1+2 AB+HIV1 P24 AG SERPL QL IA: NONREACTIVE
N GONORRHOEA DNA SPEC QL NAA+PROBE: NEGATIVE

## 2023-04-24 LAB
RPR SER QL: REACTIVE
RPR SER-TITR: ABNORMAL {TITER}

## 2023-04-29 DIAGNOSIS — N52.9 ERECTILE DYSFUNCTION, UNSPECIFIED ERECTILE DYSFUNCTION TYPE: ICD-10-CM

## 2023-05-02 ENCOUNTER — MYC MEDICAL ADVICE (OUTPATIENT)
Dept: FAMILY MEDICINE | Facility: CLINIC | Age: 43
End: 2023-05-02
Payer: COMMERCIAL

## 2023-05-02 RX ORDER — TADALAFIL 5 MG/1
TABLET ORAL
Qty: 30 TABLET | Refills: 11 | OUTPATIENT
Start: 2023-05-02

## 2023-05-03 NOTE — TELEPHONE ENCOUNTER
Patient sent My Chart message with breakdown of medications and where they were sent.    Laisha Archer RN

## 2023-05-09 ENCOUNTER — MYC MEDICAL ADVICE (OUTPATIENT)
Dept: FAMILY MEDICINE | Facility: CLINIC | Age: 43
End: 2023-05-09
Payer: COMMERCIAL

## 2023-05-09 DIAGNOSIS — F90.2 ADHD (ATTENTION DEFICIT HYPERACTIVITY DISORDER), COMBINED TYPE: Chronic | ICD-10-CM

## 2023-05-09 RX ORDER — DEXTROAMPHETAMINE SACCHARATE, AMPHETAMINE ASPARTATE MONOHYDRATE, DEXTROAMPHETAMINE SULFATE AND AMPHETAMINE SULFATE 5; 5; 5; 5 MG/1; MG/1; MG/1; MG/1
20 CAPSULE, EXTENDED RELEASE ORAL DAILY
Qty: 30 CAPSULE | Refills: 0 | Status: SHIPPED | OUTPATIENT
Start: 2023-06-09 | End: 2023-07-09

## 2023-05-09 RX ORDER — DEXTROAMPHETAMINE SACCHARATE, AMPHETAMINE ASPARTATE MONOHYDRATE, DEXTROAMPHETAMINE SULFATE AND AMPHETAMINE SULFATE 5; 5; 5; 5 MG/1; MG/1; MG/1; MG/1
20 CAPSULE, EXTENDED RELEASE ORAL DAILY
Qty: 30 CAPSULE | Refills: 0 | Status: SHIPPED | OUTPATIENT
Start: 2023-07-10 | End: 2023-07-13 | Stop reason: DRUGHIGH

## 2023-05-09 RX ORDER — DEXTROAMPHETAMINE SACCHARATE, AMPHETAMINE ASPARTATE MONOHYDRATE, DEXTROAMPHETAMINE SULFATE AND AMPHETAMINE SULFATE 5; 5; 5; 5 MG/1; MG/1; MG/1; MG/1
20 CAPSULE, EXTENDED RELEASE ORAL DAILY
Qty: 30 CAPSULE | Refills: 0 | Status: SHIPPED | OUTPATIENT
Start: 2023-05-09 | End: 2023-06-08

## 2023-05-09 NOTE — TELEPHONE ENCOUNTER
Diagnoses and all orders for this visit:    ADHD (attention deficit hyperactivity disorder), combined type  -     amphetamine-dextroamphetamine (ADDERALL XR) 20 MG 24 hr capsule; Take 1 capsule (20 mg) by mouth daily for 30 days  -     amphetamine-dextroamphetamine (ADDERALL XR) 20 MG 24 hr capsule; Take 1 capsule (20 mg) by mouth daily for 30 days  -     amphetamine-dextroamphetamine (ADDERALL XR) 20 MG 24 hr capsule; Take 1 capsule (20 mg) by mouth daily for 30 days      Medications Discontinued During This Encounter   Medication Reason     lisdexamfetamine (VYVANSE) 40 MG capsule      Change from vyvanse to adderal.     Jovani Montesinos, DO

## 2023-05-18 ASSESSMENT — PATIENT HEALTH QUESTIONNAIRE - PHQ9
SUM OF ALL RESPONSES TO PHQ QUESTIONS 1-9: 4
SUM OF ALL RESPONSES TO PHQ QUESTIONS 1-9: 4
10. IF YOU CHECKED OFF ANY PROBLEMS, HOW DIFFICULT HAVE THESE PROBLEMS MADE IT FOR YOU TO DO YOUR WORK, TAKE CARE OF THINGS AT HOME, OR GET ALONG WITH OTHER PEOPLE: SOMEWHAT DIFFICULT

## 2023-05-19 ENCOUNTER — OFFICE VISIT (OUTPATIENT)
Dept: FAMILY MEDICINE | Facility: CLINIC | Age: 43
End: 2023-05-19
Payer: COMMERCIAL

## 2023-05-19 VITALS
HEIGHT: 71 IN | HEART RATE: 91 BPM | DIASTOLIC BLOOD PRESSURE: 71 MMHG | RESPIRATION RATE: 18 BRPM | BODY MASS INDEX: 36.26 KG/M2 | OXYGEN SATURATION: 97 % | SYSTOLIC BLOOD PRESSURE: 112 MMHG | WEIGHT: 259 LBS | TEMPERATURE: 97.8 F

## 2023-05-19 DIAGNOSIS — Z20.6 CONTACT WITH AND (SUSPECTED) EXPOSURE TO HUMAN IMMUNODEFICIENCY VIRUS (HIV): ICD-10-CM

## 2023-05-19 DIAGNOSIS — Z23 IMMUNIZATION DUE: ICD-10-CM

## 2023-05-19 DIAGNOSIS — R73.03 PREDIABETES: Primary | ICD-10-CM

## 2023-05-19 DIAGNOSIS — F90.2 ADHD (ATTENTION DEFICIT HYPERACTIVITY DISORDER), COMBINED TYPE: ICD-10-CM

## 2023-05-19 LAB — HBA1C MFR BLD: 5.7 % (ref 0–5.6)

## 2023-05-19 PROCEDURE — 90746 HEPB VACCINE 3 DOSE ADULT IM: CPT | Performed by: STUDENT IN AN ORGANIZED HEALTH CARE EDUCATION/TRAINING PROGRAM

## 2023-05-19 PROCEDURE — 36415 COLL VENOUS BLD VENIPUNCTURE: CPT | Performed by: STUDENT IN AN ORGANIZED HEALTH CARE EDUCATION/TRAINING PROGRAM

## 2023-05-19 PROCEDURE — 83036 HEMOGLOBIN GLYCOSYLATED A1C: CPT | Performed by: STUDENT IN AN ORGANIZED HEALTH CARE EDUCATION/TRAINING PROGRAM

## 2023-05-19 PROCEDURE — 99214 OFFICE O/P EST MOD 30 MIN: CPT | Mod: 25 | Performed by: STUDENT IN AN ORGANIZED HEALTH CARE EDUCATION/TRAINING PROGRAM

## 2023-05-19 PROCEDURE — 90471 IMMUNIZATION ADMIN: CPT | Performed by: STUDENT IN AN ORGANIZED HEALTH CARE EDUCATION/TRAINING PROGRAM

## 2023-05-19 RX ORDER — EMTRICITABINE AND TENOFOVIR DISOPROXIL FUMARATE 200; 300 MG/1; MG/1
1 TABLET, FILM COATED ORAL DAILY
Qty: 90 TABLET | Refills: 0 | Status: SHIPPED | OUTPATIENT
Start: 2023-05-19 | End: 2023-08-15

## 2023-05-19 NOTE — PROGRESS NOTES
"Answers for HPI/ROS submitted by the patient on 5/18/2023  If you checked off any problems, how difficult have these problems made it for you to do your work, take care of things at home, or get along with other people?: Somewhat difficult  PHQ9 TOTAL SCORE: 4      Assessment & Plan     Bryan was seen today for recheck medication.    Diagnoses and all orders for this visit:    Prediabetes  -     Hemoglobin A1c; Future  -     Hemoglobin A1c  Prev elevated fasting gluc. A1c in prediabetes range. Pt to work on lifestyle modifications. Will consider metformin at next visit.     ADHD (attention deficit hyperactivity disorder), combined type  Chronic stable concern. Continue adderall.     PrEP Candidate  -     emtricitabine-tenofovir (TRUVADA) 200-300 MG per tablet; Take 1 tablet by mouth daily  HIV neg 1 month ago. Refill today. Will follow up in 2 months.     Immunization due  -     HEPATITIS B VACCINE,ADULT,IM  Hep B #2 today.         Ordering of each unique test  Prescription drug management    Return in about 2 months (around 7/19/2023).    DO HEDY Grullon [facility] ASHA Adams is a 43 year old, presenting for the following health issues:  Recheck Medication (Pt here for med check.)        5/19/2023     8:18 AM   Additional Questions   Roomed by Roseline   Accompanied by Self         5/19/2023     8:18 AM   Patient Reported Additional Medications   Patient reports taking the following new medications No     HPI     ADHD- stable on adderal.    Gluc elevated in past- a1c today    PrEP refill.        1/18/2023     1:24 PM 5/18/2023    11:40 PM 5/19/2023     8:19 AM   PHQ   PHQ-9 Total Score 2 4 3   Q9: Thoughts of better off dead/self-harm past 2 weeks Not at all Not at all Not at all         Objective    /71 (BP Location: Left arm, Patient Position: Sitting, Cuff Size: Adult Large)   Pulse 91   Temp 97.8  F (36.6  C) (Oral)   Resp 18   Ht 1.815 m (5' 11.46\")   Wt 117.5 kg (259 " lb)   SpO2 97%   BMI 35.66 kg/m    Body mass index is 35.66 kg/m .  Physical Exam   General: Alert and oriented, in no acute distress.  Skin: Warm and dry, no abnormalities noted.  Eyes: Extra-ocular muscles grossly intact, pupils equal.  ENT: Speech intact, nasal passages open, no hearing impairment noted.  CV: No cyanosis or pallor, warm and well perfused.  Respiratory: No respiratory distress, no accessory muscle use.  Neuro: Gait and station normal, comprehension intact. Gross and fine motor skills intact.   Psychiatric: Mood and affect appear normal.   Extremities: Warm, able to move all four extremities at will.      Results for orders placed or performed in visit on 05/19/23 (from the past 24 hour(s))   Hemoglobin A1c   Result Value Ref Range    Hemoglobin A1C 5.7 (H) 0.0 - 5.6 %

## 2023-06-08 ENCOUNTER — DOCUMENTATION ONLY (OUTPATIENT)
Dept: FAMILY MEDICINE | Facility: CLINIC | Age: 43
End: 2023-06-08
Payer: COMMERCIAL

## 2023-06-08 NOTE — PROGRESS NOTES
"Form has been completed by provider.     Form sent out via: Fax to [facility] at Fax Number: [phone]  Patient informed: No  Output date: June 8, 2023    Laisha Archer RN      **Please close the encounter**        When opening a documentation only encounter, be sure to enter in \"Chief Complaint\" Forms and in \" Comments\" Title of form, description if needed.    Bryan is a 43 year old  male  Form received via: My Chart  Form now resides in: Provider Ready    Laisha Archer RN                  "

## 2023-06-13 ENCOUNTER — MYC REFILL (OUTPATIENT)
Dept: FAMILY MEDICINE | Facility: CLINIC | Age: 43
End: 2023-06-13
Payer: COMMERCIAL

## 2023-06-13 DIAGNOSIS — F90.2 ADHD (ATTENTION DEFICIT HYPERACTIVITY DISORDER), COMBINED TYPE: Chronic | ICD-10-CM

## 2023-06-21 RX ORDER — DEXTROAMPHETAMINE SACCHARATE, AMPHETAMINE ASPARTATE MONOHYDRATE, DEXTROAMPHETAMINE SULFATE AND AMPHETAMINE SULFATE 5; 5; 5; 5 MG/1; MG/1; MG/1; MG/1
20 CAPSULE, EXTENDED RELEASE ORAL DAILY
Qty: 30 CAPSULE | Refills: 0 | OUTPATIENT
Start: 2023-06-21

## 2023-06-22 ENCOUNTER — MYC MEDICAL ADVICE (OUTPATIENT)
Dept: FAMILY MEDICINE | Facility: CLINIC | Age: 43
End: 2023-06-22
Payer: COMMERCIAL

## 2023-06-25 DIAGNOSIS — G47.00 INSOMNIA, UNSPECIFIED TYPE: ICD-10-CM

## 2023-06-26 DIAGNOSIS — K21.9 GASTROESOPHAGEAL REFLUX DISEASE, UNSPECIFIED WHETHER ESOPHAGITIS PRESENT: ICD-10-CM

## 2023-06-26 RX ORDER — TRAZODONE HYDROCHLORIDE 50 MG/1
TABLET, FILM COATED ORAL
Qty: 180 TABLET | Refills: 3 | Status: SHIPPED | OUTPATIENT
Start: 2023-06-26 | End: 2024-05-28

## 2023-06-26 NOTE — TELEPHONE ENCOUNTER
"Request for medication refill:  traZODone (DESYREL) 50 MG tablet    Providers if patient needs an appointment and you are willing to give a one month supply please refill for one month and  send a letter/MyChart using \".SMILLIMITEDREFILL\" .smillimited and route chart to \"P [facility] \" (Giving one month refill in non controlled medications is strongly recommended before denial)    If refill has been denied, meaning absolutely no refills without visit, please complete the smart phrase \".smirxrefuse\" and route it to the \"P [facility] MED REFILLS\"  pool to inform the patient and the pharmacy.    Karla Clements MA        "

## 2023-06-27 RX ORDER — FAMOTIDINE 40 MG/1
TABLET, FILM COATED ORAL
Qty: 180 TABLET | Refills: 3 | Status: SHIPPED | OUTPATIENT
Start: 2023-06-27 | End: 2024-06-21

## 2023-06-27 NOTE — TELEPHONE ENCOUNTER
"Request for medication refill:  famotidine (PEPCID) 40 MG tablet  Providers if patient needs an appointment and you are willing to give a one month supply please refill for one month and  send a letter/MyChart using \".SMILLIMITEDREFILL\" .smillimited and route chart to \"P [facility] \" (Giving one month refill in non controlled medications is strongly recommended before denial)    If refill has been denied, meaning absolutely no refills without visit, please complete the smart phrase \".smirxrefuse\" and route it to the \"P [facility] MED REFILLS\"  pool to inform the patient and the pharmacy.    Roseline Mosher MA        "

## 2023-08-15 ENCOUNTER — OFFICE VISIT (OUTPATIENT)
Dept: FAMILY MEDICINE | Facility: CLINIC | Age: 43
End: 2023-08-15
Payer: COMMERCIAL

## 2023-08-15 VITALS
HEART RATE: 74 BPM | BODY MASS INDEX: 36.06 KG/M2 | SYSTOLIC BLOOD PRESSURE: 126 MMHG | WEIGHT: 257.6 LBS | HEIGHT: 71 IN | OXYGEN SATURATION: 98 % | RESPIRATION RATE: 18 BRPM | DIASTOLIC BLOOD PRESSURE: 85 MMHG

## 2023-08-15 DIAGNOSIS — F90.2 ADHD (ATTENTION DEFICIT HYPERACTIVITY DISORDER), COMBINED TYPE: Primary | Chronic | ICD-10-CM

## 2023-08-15 DIAGNOSIS — R74.01 ELEVATED LIVER TRANSAMINASE LEVEL: ICD-10-CM

## 2023-08-15 DIAGNOSIS — Z11.3 ROUTINE SCREENING FOR STI (SEXUALLY TRANSMITTED INFECTION): ICD-10-CM

## 2023-08-15 DIAGNOSIS — Z11.4 SCREENING FOR HIV (HUMAN IMMUNODEFICIENCY VIRUS): ICD-10-CM

## 2023-08-15 DIAGNOSIS — Z20.6 CONTACT WITH AND (SUSPECTED) EXPOSURE TO HUMAN IMMUNODEFICIENCY VIRUS (HIV): ICD-10-CM

## 2023-08-15 LAB
ALBUMIN SERPL BCG-MCNC: 4.2 G/DL (ref 3.5–5.2)
ALP SERPL-CCNC: 65 U/L (ref 40–129)
ALT SERPL W P-5'-P-CCNC: 42 U/L (ref 0–70)
ANION GAP SERPL CALCULATED.3IONS-SCNC: 10 MMOL/L (ref 7–15)
AST SERPL W P-5'-P-CCNC: 41 U/L (ref 0–45)
BILIRUB SERPL-MCNC: 0.2 MG/DL
BUN SERPL-MCNC: 10.6 MG/DL (ref 6–20)
CALCIUM SERPL-MCNC: 9.6 MG/DL (ref 8.6–10)
CHLORIDE SERPL-SCNC: 103 MMOL/L (ref 98–107)
CREAT SERPL-MCNC: 0.93 MG/DL (ref 0.67–1.17)
DEPRECATED HCO3 PLAS-SCNC: 25 MMOL/L (ref 22–29)
GFR SERPL CREATININE-BSD FRML MDRD: >90 ML/MIN/1.73M2
GLUCOSE SERPL-MCNC: 89 MG/DL (ref 70–99)
HIV 1+2 AB+HIV1 P24 AG SERPL QL IA: NONREACTIVE
POTASSIUM SERPL-SCNC: 3.8 MMOL/L (ref 3.4–5.3)
PROT SERPL-MCNC: 7.2 G/DL (ref 6.4–8.3)
SODIUM SERPL-SCNC: 138 MMOL/L (ref 136–145)

## 2023-08-15 PROCEDURE — 87491 CHLMYD TRACH DNA AMP PROBE: CPT | Performed by: STUDENT IN AN ORGANIZED HEALTH CARE EDUCATION/TRAINING PROGRAM

## 2023-08-15 PROCEDURE — 86593 SYPHILIS TEST NON-TREP QUANT: CPT | Performed by: STUDENT IN AN ORGANIZED HEALTH CARE EDUCATION/TRAINING PROGRAM

## 2023-08-15 PROCEDURE — 87389 HIV-1 AG W/HIV-1&-2 AB AG IA: CPT | Performed by: STUDENT IN AN ORGANIZED HEALTH CARE EDUCATION/TRAINING PROGRAM

## 2023-08-15 PROCEDURE — 87591 N.GONORRHOEAE DNA AMP PROB: CPT | Performed by: STUDENT IN AN ORGANIZED HEALTH CARE EDUCATION/TRAINING PROGRAM

## 2023-08-15 PROCEDURE — 36415 COLL VENOUS BLD VENIPUNCTURE: CPT | Performed by: STUDENT IN AN ORGANIZED HEALTH CARE EDUCATION/TRAINING PROGRAM

## 2023-08-15 PROCEDURE — 86592 SYPHILIS TEST NON-TREP QUAL: CPT | Performed by: STUDENT IN AN ORGANIZED HEALTH CARE EDUCATION/TRAINING PROGRAM

## 2023-08-15 PROCEDURE — 80053 COMPREHEN METABOLIC PANEL: CPT | Performed by: STUDENT IN AN ORGANIZED HEALTH CARE EDUCATION/TRAINING PROGRAM

## 2023-08-15 PROCEDURE — 99214 OFFICE O/P EST MOD 30 MIN: CPT | Performed by: STUDENT IN AN ORGANIZED HEALTH CARE EDUCATION/TRAINING PROGRAM

## 2023-08-15 RX ORDER — EMTRICITABINE AND TENOFOVIR DISOPROXIL FUMARATE 200; 300 MG/1; MG/1
1 TABLET, FILM COATED ORAL DAILY
Qty: 90 TABLET | Refills: 0 | Status: SHIPPED | OUTPATIENT
Start: 2023-08-15 | End: 2023-09-19

## 2023-08-15 RX ORDER — DEXTROAMPHETAMINE SACCHARATE, AMPHETAMINE ASPARTATE MONOHYDRATE, DEXTROAMPHETAMINE SULFATE AND AMPHETAMINE SULFATE 7.5; 7.5; 7.5; 7.5 MG/1; MG/1; MG/1; MG/1
30 CAPSULE, EXTENDED RELEASE ORAL DAILY
Qty: 30 CAPSULE | Refills: 0 | Status: SHIPPED | OUTPATIENT
Start: 2023-08-15 | End: 2023-09-14

## 2023-08-15 RX ORDER — DEXTROAMPHETAMINE SACCHARATE, AMPHETAMINE ASPARTATE MONOHYDRATE, DEXTROAMPHETAMINE SULFATE AND AMPHETAMINE SULFATE 7.5; 7.5; 7.5; 7.5 MG/1; MG/1; MG/1; MG/1
30 CAPSULE, EXTENDED RELEASE ORAL DAILY
Qty: 30 CAPSULE | Refills: 0 | Status: SHIPPED | OUTPATIENT
Start: 2023-10-16 | End: 2023-11-15

## 2023-08-15 RX ORDER — DEXTROAMPHETAMINE SACCHARATE, AMPHETAMINE ASPARTATE MONOHYDRATE, DEXTROAMPHETAMINE SULFATE AND AMPHETAMINE SULFATE 7.5; 7.5; 7.5; 7.5 MG/1; MG/1; MG/1; MG/1
30 CAPSULE, EXTENDED RELEASE ORAL DAILY
Qty: 30 CAPSULE | Refills: 0 | Status: SHIPPED | OUTPATIENT
Start: 2023-09-15 | End: 2023-09-27

## 2023-08-15 NOTE — PROGRESS NOTES
Assessment & Plan     Bryan was seen today for follow up.    Diagnoses and all orders for this visit:    ADHD (attention deficit hyperactivity disorder), combined type  -     amphetamine-dextroamphetamine (ADDERALL XR) 30 MG 24 hr capsule; Take 1 capsule (30 mg) by mouth daily for 30 days  -     amphetamine-dextroamphetamine (ADDERALL XR) 30 MG 24 hr capsule; Take 1 capsule (30 mg) by mouth daily for 30 days  -     amphetamine-dextroamphetamine (ADDERALL XR) 30 MG 24 hr capsule; Take 1 capsule (30 mg) by mouth daily for 30 days  Dose working well. No concerns. 3 month refill    PrEP Candidate  -     emtricitabine-tenofovir (TRUVADA) 200-300 MG per tablet; Take 1 tablet by mouth daily  Continue med as he is prep eligible.     Routine screening for STI (sexually transmitted infection)  -     Rapid Plasma Reagin w Rflx to TITER; Future  -     Chlamydia trachomatis/Neisseria gonorrhoeae by PCR - Clinic Collect  -     Chlamydia trachomatis/Neisseria gonorrhoeae by PCR - Clinic Collect  -     Rapid Plasma Reagin w Rflx to TITER  -     Rapid Plasma Reagin Titer  Testing negative    Screening for HIV (human immunodeficiency virus)  -     HIV Antigen Antibody Combo; Future  -     HIV Antigen Antibody Combo  Testing negative    Elevated liver transaminase level  -     Comprehensive Metabolic Panel; Future  -     Comprehensive Metabolic Panel  Previously elevated, now returned to normal. Has been working on diet. Still considering if fatty liver related or T related.         Ordering of each unique test  Prescription drug management        Return in about 3 months (around 11/15/2023).    Jovani Montesinos DO  [facility] ASHA Adams is a 43 year old, presenting for the following health issues:  Follow Up (Prep check up)        8/15/2023     8:49 AM   Additional Questions   Roomed by Greg   Accompanied by Self       HPI   PrEP visit  No new exposures/ no symptoms.  Swabs but not urine  "needed.      Hopefully less job stress soon-To day shift          Objective    /85   Pulse 74   Resp 18   Ht 1.803 m (5' 11\")   Wt 116.8 kg (257 lb 9.6 oz)   SpO2 98%   BMI 35.93 kg/m    Body mass index is 35.93 kg/m .  Physical Exam   General: Alert and oriented, in no acute distress.  Skin: Warm and dry, no abnormalities noted.  Eyes: Extra-ocular muscles grossly intact, pupils equal.  ENT: Speech intact, nasal passages open, no hearing impairment noted.  CV: No cyanosis or pallor, warm and well perfused.  Respiratory: No respiratory distress, no accessory muscle use.  Neuro: Gait and station normal, comprehension intact. Gross and fine motor skills intact.   Psychiatric: Mood and affect appear normal.   Extremities: Warm, able to move all four extremities at will.  : offered chaperone and declined. Normal external anus no lesions.                "

## 2023-08-16 LAB
C TRACH DNA SPEC QL PROBE+SIG AMP: NEGATIVE
C TRACH DNA SPEC QL PROBE+SIG AMP: NEGATIVE
N GONORRHOEA DNA SPEC QL NAA+PROBE: NEGATIVE
N GONORRHOEA DNA SPEC QL NAA+PROBE: NEGATIVE
RPR SER QL: REACTIVE
RPR SER-TITR: ABNORMAL {TITER}

## 2023-09-14 ENCOUNTER — MYC MEDICAL ADVICE (OUTPATIENT)
Dept: FAMILY MEDICINE | Facility: CLINIC | Age: 43
End: 2023-09-14
Payer: COMMERCIAL

## 2023-09-19 ENCOUNTER — MYC REFILL (OUTPATIENT)
Dept: FAMILY MEDICINE | Facility: CLINIC | Age: 43
End: 2023-09-19
Payer: COMMERCIAL

## 2023-09-19 DIAGNOSIS — Z20.6 CONTACT WITH AND (SUSPECTED) EXPOSURE TO HUMAN IMMUNODEFICIENCY VIRUS (HIV): ICD-10-CM

## 2023-09-20 RX ORDER — EMTRICITABINE AND TENOFOVIR DISOPROXIL FUMARATE 200; 300 MG/1; MG/1
1 TABLET, FILM COATED ORAL DAILY
Qty: 90 TABLET | Refills: 0 | Status: SHIPPED | OUTPATIENT
Start: 2023-09-20 | End: 2023-11-14

## 2023-09-20 NOTE — TELEPHONE ENCOUNTER
"Request for medication refill:  emtricitabine-tenofovir (TRUVADA) 200-300 MG per tablet   Providers if patient needs an appointment and you are willing to give a one month supply please refill for one month and  send a letter/MyChart using \".SMILLIMITEDREFILL\" .smillimited and route chart to \"P [facility] \" (Giving one month refill in non controlled medications is strongly recommended before denial)    If refill has been denied, meaning absolutely no refills without visit, please complete the smart phrase \".smirxrefuse\" and route it to the \"P [facility] MED REFILLS\"  pool to inform the patient and the pharmacy.    Laisha Archer RN      "

## 2023-09-27 ENCOUNTER — MYC MEDICAL ADVICE (OUTPATIENT)
Dept: FAMILY MEDICINE | Facility: CLINIC | Age: 43
End: 2023-09-27
Payer: COMMERCIAL

## 2023-09-27 DIAGNOSIS — F90.2 ADHD (ATTENTION DEFICIT HYPERACTIVITY DISORDER), COMBINED TYPE: Chronic | ICD-10-CM

## 2023-09-27 RX ORDER — DEXTROAMPHETAMINE SACCHARATE, AMPHETAMINE ASPARTATE MONOHYDRATE, DEXTROAMPHETAMINE SULFATE AND AMPHETAMINE SULFATE 7.5; 7.5; 7.5; 7.5 MG/1; MG/1; MG/1; MG/1
30 CAPSULE, EXTENDED RELEASE ORAL DAILY
Qty: 30 CAPSULE | Refills: 0 | Status: SHIPPED | OUTPATIENT
Start: 2023-09-27 | End: 2023-10-16

## 2023-09-27 NOTE — TELEPHONE ENCOUNTER
Pt is requesting script be sent to different [facility]'s due to stocking issues. RN queued up medication and forwarding to provider.     Juana Barros RN

## 2023-10-18 ENCOUNTER — OFFICE VISIT (OUTPATIENT)
Dept: FAMILY MEDICINE | Facility: CLINIC | Age: 43
End: 2023-10-18
Payer: COMMERCIAL

## 2023-10-18 VITALS
BODY MASS INDEX: 36.44 KG/M2 | SYSTOLIC BLOOD PRESSURE: 114 MMHG | OXYGEN SATURATION: 98 % | HEART RATE: 108 BPM | HEIGHT: 71 IN | WEIGHT: 260.3 LBS | RESPIRATION RATE: 18 BRPM | DIASTOLIC BLOOD PRESSURE: 78 MMHG

## 2023-10-18 DIAGNOSIS — Z23 IMMUNIZATION DUE: ICD-10-CM

## 2023-10-18 DIAGNOSIS — F33.8 SEASONAL AFFECTIVE DISORDER (H): ICD-10-CM

## 2023-10-18 DIAGNOSIS — I10 ESSENTIAL HYPERTENSION: ICD-10-CM

## 2023-10-18 DIAGNOSIS — F90.2 ADHD (ATTENTION DEFICIT HYPERACTIVITY DISORDER), COMBINED TYPE: Primary | Chronic | ICD-10-CM

## 2023-10-18 DIAGNOSIS — N52.9 ERECTILE DYSFUNCTION, UNSPECIFIED ERECTILE DYSFUNCTION TYPE: ICD-10-CM

## 2023-10-18 PROCEDURE — 90472 IMMUNIZATION ADMIN EACH ADD: CPT | Performed by: STUDENT IN AN ORGANIZED HEALTH CARE EDUCATION/TRAINING PROGRAM

## 2023-10-18 PROCEDURE — 99214 OFFICE O/P EST MOD 30 MIN: CPT | Mod: 25 | Performed by: STUDENT IN AN ORGANIZED HEALTH CARE EDUCATION/TRAINING PROGRAM

## 2023-10-18 PROCEDURE — 90471 IMMUNIZATION ADMIN: CPT | Performed by: STUDENT IN AN ORGANIZED HEALTH CARE EDUCATION/TRAINING PROGRAM

## 2023-10-18 PROCEDURE — 90746 HEPB VACCINE 3 DOSE ADULT IM: CPT | Performed by: STUDENT IN AN ORGANIZED HEALTH CARE EDUCATION/TRAINING PROGRAM

## 2023-10-18 PROCEDURE — 90651 9VHPV VACCINE 2/3 DOSE IM: CPT | Performed by: STUDENT IN AN ORGANIZED HEALTH CARE EDUCATION/TRAINING PROGRAM

## 2023-10-18 RX ORDER — LISINOPRIL 5 MG/1
5 TABLET ORAL DAILY
Qty: 90 TABLET | Refills: 3 | Status: SHIPPED | OUTPATIENT
Start: 2023-10-18 | End: 2024-08-15

## 2023-10-18 RX ORDER — DEXTROAMPHETAMINE SACCHARATE, AMPHETAMINE ASPARTATE MONOHYDRATE, DEXTROAMPHETAMINE SULFATE AND AMPHETAMINE SULFATE 7.5; 7.5; 7.5; 7.5 MG/1; MG/1; MG/1; MG/1
30 CAPSULE, EXTENDED RELEASE ORAL DAILY
Qty: 30 CAPSULE | Refills: 0 | Status: SHIPPED | OUTPATIENT
Start: 2023-12-19 | End: 2024-01-18

## 2023-10-18 RX ORDER — DEXTROAMPHETAMINE SACCHARATE, AMPHETAMINE ASPARTATE MONOHYDRATE, DEXTROAMPHETAMINE SULFATE AND AMPHETAMINE SULFATE 7.5; 7.5; 7.5; 7.5 MG/1; MG/1; MG/1; MG/1
30 CAPSULE, EXTENDED RELEASE ORAL DAILY
Qty: 30 CAPSULE | Refills: 0 | Status: CANCELLED | OUTPATIENT
Start: 2023-10-18

## 2023-10-18 RX ORDER — DEXTROAMPHETAMINE SACCHARATE, AMPHETAMINE ASPARTATE MONOHYDRATE, DEXTROAMPHETAMINE SULFATE AND AMPHETAMINE SULFATE 7.5; 7.5; 7.5; 7.5 MG/1; MG/1; MG/1; MG/1
30 CAPSULE, EXTENDED RELEASE ORAL DAILY
Qty: 30 CAPSULE | Refills: 0 | Status: SHIPPED | OUTPATIENT
Start: 2023-11-18 | End: 2023-12-18

## 2023-10-18 RX ORDER — TADALAFIL 5 MG/1
5 TABLET ORAL EVERY 24 HOURS
Qty: 90 TABLET | Refills: 3 | Status: SHIPPED | OUTPATIENT
Start: 2023-10-18 | End: 2024-05-22

## 2023-10-18 NOTE — PROGRESS NOTES
"  Assessment & Plan     Bryan was seen today for imm/inj and recheck medication.    Diagnoses and all orders for this visit:    ADHD (attention deficit hyperactivity disorder), combined type  -     amphetamine-dextroamphetamine (ADDERALL XR) 30 MG 24 hr capsule; Take 1 capsule (30 mg) by mouth daily for 30 days  -     amphetamine-dextroamphetamine (ADDERALL XR) 30 MG 24 hr capsule; Take 1 capsule (30 mg) by mouth daily for 30 days  Sable ADHD and doing well. Just picked up this month Rx and will order 2 more months.     Essential hypertension  -     lisinopril (ZESTRIL) 5 MG tablet; Take 1 tablet (5 mg) by mouth daily  At goal.     Seasonal affective disorder (H24)  -     sertraline (ZOLOFT) 50 MG tablet; Take 1 tablet (50 mg) by mouth daily  At goal    Erectile dysfunction, unspecified erectile dysfunction type  -     tadalafil (CIALIS) 5 MG tablet; Take 1 tablet (5 mg) by mouth every 24 hours  Refilled    Immunization due  -     HEPATITIS B VACCINE,ADULT,IM  -     HPV9 (Gardasil 9 )  Updated shots. Hep A shot next month.     Prescription drug management     BMI:   Estimated body mass index is 36.3 kg/m  as calculated from the following:    Height as of this encounter: 1.803 m (5' 11\").    Weight as of this encounter: 118.1 kg (260 lb 4.8 oz).   Weight management plan: deferred      Return in about 4 weeks (around 11/15/2023).    Jovani Montesinos DO  [facility] ASHA Adams is a 43 year old, presenting for the following health issues:  Imm/Inj (Vaccines) and Recheck Medication (Refill)      10/18/2023     8:24 AM   Additional Questions   Roomed by Greg   Accompanied by Self       HPI     Shots today    Meds working well          Objective    /78   Pulse 108   Resp 18   Ht 1.803 m (5' 11\")   Wt 118.1 kg (260 lb 4.8 oz)   SpO2 98%   BMI 36.30 kg/m    Body mass index is 36.3 kg/m .  Physical Exam   General: Alert and oriented, in no acute distress.  Skin: Warm and dry, no " abnormalities noted.  Eyes: Extra-ocular muscles grossly intact, pupils equal.  ENT: Speech intact, nasal passages open, no hearing impairment noted.  CV: No cyanosis or pallor, warm and well perfused.  Respiratory: No respiratory distress, no accessory muscle use.  Neuro: Gait and station normal, comprehension intact. Gross and fine motor skills intact.   Psychiatric: Mood and affect appear normal.   Extremities: Warm, able to move all four extremities at will.

## 2023-10-31 ENCOUNTER — MYC REFILL (OUTPATIENT)
Dept: FAMILY MEDICINE | Facility: CLINIC | Age: 43
End: 2023-10-31
Payer: COMMERCIAL

## 2023-10-31 DIAGNOSIS — L65.9 HAIR LOSS: ICD-10-CM

## 2023-11-06 RX ORDER — FINASTERIDE 1 MG/1
1 TABLET, FILM COATED ORAL DAILY
Qty: 90 TABLET | Refills: 3 | Status: SHIPPED | OUTPATIENT
Start: 2023-11-06 | End: 2024-05-22

## 2023-11-06 NOTE — TELEPHONE ENCOUNTER
"Request for medication refill:  finasteride (PROPECIA) 1 MG tablet     Providers if patient needs an appointment and you are willing to give a one month supply please refill for one month and  send a letter/MyChart using \".SMILLIMITEDREFILL\" .smillimited and route chart to \"[facility] \" (Giving one month refill in non controlled medications is strongly recommended before denial)    If refill has been denied, meaning absolutely no refills without visit, please complete the smart phrase \".smirxrefuse\" and route it to the \"[facility] MED REFILLS\"  pool to inform the patient and the pharmacy.    Roseline Mosher, [facility]      "

## 2023-11-14 ENCOUNTER — OFFICE VISIT (OUTPATIENT)
Dept: FAMILY MEDICINE | Facility: CLINIC | Age: 43
End: 2023-11-14
Payer: COMMERCIAL

## 2023-11-14 VITALS
SYSTOLIC BLOOD PRESSURE: 127 MMHG | DIASTOLIC BLOOD PRESSURE: 77 MMHG | OXYGEN SATURATION: 97 % | HEART RATE: 82 BPM | WEIGHT: 258 LBS | HEIGHT: 71 IN | BODY MASS INDEX: 36.12 KG/M2

## 2023-11-14 DIAGNOSIS — Z20.6 CONTACT WITH AND (SUSPECTED) EXPOSURE TO HUMAN IMMUNODEFICIENCY VIRUS (HIV): Primary | ICD-10-CM

## 2023-11-14 DIAGNOSIS — R74.01 ELEVATED LIVER TRANSAMINASE LEVEL: ICD-10-CM

## 2023-11-14 DIAGNOSIS — Z11.4 SCREENING FOR HIV (HUMAN IMMUNODEFICIENCY VIRUS): ICD-10-CM

## 2023-11-14 DIAGNOSIS — Z23 IMMUNIZATION DUE: ICD-10-CM

## 2023-11-14 DIAGNOSIS — Z11.3 ROUTINE SCREENING FOR STI (SEXUALLY TRANSMITTED INFECTION): ICD-10-CM

## 2023-11-14 DIAGNOSIS — R73.03 PREDIABETES: ICD-10-CM

## 2023-11-14 LAB
ALBUMIN SERPL BCG-MCNC: 4.1 G/DL (ref 3.5–5.2)
ALP SERPL-CCNC: 53 U/L (ref 40–150)
ALT SERPL W P-5'-P-CCNC: 83 U/L (ref 0–70)
ANION GAP SERPL CALCULATED.3IONS-SCNC: 9 MMOL/L (ref 7–15)
AST SERPL W P-5'-P-CCNC: 50 U/L (ref 0–45)
BILIRUB SERPL-MCNC: 0.3 MG/DL
BUN SERPL-MCNC: 9.5 MG/DL (ref 6–20)
CALCIUM SERPL-MCNC: 9.1 MG/DL (ref 8.6–10)
CHLORIDE SERPL-SCNC: 108 MMOL/L (ref 98–107)
CREAT SERPL-MCNC: 0.83 MG/DL (ref 0.67–1.17)
DEPRECATED HCO3 PLAS-SCNC: 23 MMOL/L (ref 22–29)
EGFRCR SERPLBLD CKD-EPI 2021: >90 ML/MIN/1.73M2
GLUCOSE SERPL-MCNC: 103 MG/DL (ref 70–99)
HBA1C MFR BLD: 5.3 % (ref 0–5.6)
POTASSIUM SERPL-SCNC: 4.3 MMOL/L (ref 3.4–5.3)
PROT SERPL-MCNC: 6.8 G/DL (ref 6.4–8.3)
SODIUM SERPL-SCNC: 140 MMOL/L (ref 135–145)

## 2023-11-14 PROCEDURE — 90632 HEPA VACCINE ADULT IM: CPT | Performed by: STUDENT IN AN ORGANIZED HEALTH CARE EDUCATION/TRAINING PROGRAM

## 2023-11-14 PROCEDURE — 90471 IMMUNIZATION ADMIN: CPT | Performed by: STUDENT IN AN ORGANIZED HEALTH CARE EDUCATION/TRAINING PROGRAM

## 2023-11-14 PROCEDURE — 99214 OFFICE O/P EST MOD 30 MIN: CPT | Mod: 25 | Performed by: STUDENT IN AN ORGANIZED HEALTH CARE EDUCATION/TRAINING PROGRAM

## 2023-11-14 PROCEDURE — 80053 COMPREHEN METABOLIC PANEL: CPT | Performed by: STUDENT IN AN ORGANIZED HEALTH CARE EDUCATION/TRAINING PROGRAM

## 2023-11-14 PROCEDURE — 36415 COLL VENOUS BLD VENIPUNCTURE: CPT | Performed by: STUDENT IN AN ORGANIZED HEALTH CARE EDUCATION/TRAINING PROGRAM

## 2023-11-14 PROCEDURE — 83036 HEMOGLOBIN GLYCOSYLATED A1C: CPT | Performed by: STUDENT IN AN ORGANIZED HEALTH CARE EDUCATION/TRAINING PROGRAM

## 2023-11-14 RX ORDER — MINOXIDIL 10 MG/1
2 TABLET ORAL
COMMUNITY
Start: 2023-10-21 | End: 2023-12-13

## 2023-11-14 RX ORDER — EMTRICITABINE AND TENOFOVIR DISOPROXIL FUMARATE 200; 300 MG/1; MG/1
1 TABLET, FILM COATED ORAL DAILY
Qty: 90 TABLET | Refills: 0 | Status: SHIPPED | OUTPATIENT
Start: 2023-11-14 | End: 2024-01-04

## 2023-11-14 NOTE — PROGRESS NOTES
"  Assessment & Plan     Bryan was seen today for injections and follow up.    Diagnoses and all orders for this visit:    PrEP Candidate  -     emtricitabine-tenofovir (TRUVADA) 200-300 MG per tablet; Take 1 tablet by mouth daily  Refilled medication. Chronic stable condition.     Routine screening for STI (sexually transmitted infection)  -     Treponema Abs w Reflex to RPR and Titer; Future    Screening for HIV (human immunodeficiency virus)  -     HIV Antigen Antibody Combo Cascade; Future    Elevated liver transaminase level  -     Comprehensive Metabolic Panel; Future  -     Comprehensive Metabolic Panel  AST and ALT stable and elevated. Not 1.5x above upper limit of normal, so will monitor every 6 months.     Prediabetes  -     Hemoglobin A1c; Future  -     Hemoglobin A1c  Improved and now down to normal range. Will check q6 months.     Immunization due  -     HEPATITIS A 19+ (HAVRIX/VAQTA)    Ordering of each unique test  Prescription drug management    DO HEDY [facility] ASHA Adams is a 43 year old, presenting for the following health issues:  Injections (Hep A) and Follow Up (Prep Labs )      11/14/2023     8:49 AM   Additional Questions   Roomed by Greg   Accompanied by Self       HPI     Here for labs and Hep A shot    PrEP- going well. No new exposures. No symptoms.    Review of Systems   DENIES: Denies lymphadenopathy,fevers,chills,rigors,night sweats,weight loss,oral thrush,mouth lesions,dyspnea,cough,diarrhea, edema.  DENIES: Denies  discharge, rash, genital lesions.        Objective    /77   Pulse 82   Ht 1.803 m (5' 11\")   Wt 117 kg (258 lb)   SpO2 97%   BMI 35.98 kg/m    Body mass index is 35.98 kg/m .  Physical Exam   General: Alert and oriented, in no acute distress.  Skin: Warm and dry, no abnormalities noted.  Eyes: Extra-ocular muscles grossly intact, pupils equal.  ENT: Speech intact, nasal passages open, no hearing impairment noted.  CV: " No cyanosis or pallor, warm and well perfused.  Respiratory: No respiratory distress, no accessory muscle use.  Neuro: Gait and station normal, comprehension intact. Gross and fine motor skills intact.   Psychiatric: Mood and affect appear normal.   Extremities: Warm, able to move all four extremities at will.      Results for orders placed or performed in visit on 11/14/23 (from the past 24 hour(s))   Comprehensive Metabolic Panel   Result Value Ref Range    Sodium 140 135 - 145 mmol/L    Potassium 4.3 3.4 - 5.3 mmol/L    Carbon Dioxide (CO2) 23 22 - 29 mmol/L    Anion Gap 9 7 - 15 mmol/L    Urea Nitrogen 9.5 6.0 - 20.0 mg/dL    Creatinine 0.83 0.67 - 1.17 mg/dL    GFR Estimate >90 >60 mL/min/1.73m2    Calcium 9.1 8.6 - 10.0 mg/dL    Chloride 108 (H) 98 - 107 mmol/L    Glucose 103 (H) 70 - 99 mg/dL    Alkaline Phosphatase 53 40 - 150 U/L    AST 50 (H) 0 - 45 U/L    ALT 83 (H) 0 - 70 U/L    Protein Total 6.8 6.4 - 8.3 g/dL    Albumin 4.1 3.5 - 5.2 g/dL    Bilirubin Total 0.3 <=1.2 mg/dL   Hemoglobin A1c   Result Value Ref Range    Hemoglobin A1C 5.3 0.0 - 5.6 %

## 2023-12-11 NOTE — COMMUNITY RESOURCES LIST (ENGLISH)
12/11/2023   [facility]  N/A  For questions about this resource list or additional care needs, please contact your primary care clinic or care manager.  Phone: [phone]   Email: N/A   Address: [address] [place] [id number]   Hours: N/A        Hotlines and Helplines       Hotline - Housing crisis  1  [facility] Distance: 0.59 miles      Phone/Virtual   [address] [place] [address]  Language: English  Hours: Mon - Sun Open 24 Hours   Phone: (537) 284-3485 Email: communications@\[facility]\""-[place].org Website: https://[facility]-[place].org/oursaviourshousing/     2  [facility] Distance: 1.1 miles      Phone/Virtual   [address] [place] [address]  Language: English  Hours: Mon - Sun Open 24 Hours   Phone: (902) 156-3029 Email: info@[facility].org Website: http://www.[facility].org          Housing       Coordinated Entry access point  3  [facility] [employer] [facility] [place] ([facility] - Housing Services Distance: 0.38 miles      In-Person   [address] [place] [address]  Language: English  Hours: Mon - Fri 9:00 AM - 5:00 PM  Fees: Free   Phone: (536) 176-9401 Email: housing@[facility].org Website: http://www.[facility].org/housing     4  [facility] - Adult nursing home [facility] Distance: 1.28 miles      In-Person   [address] [place] [address]  Language: English  Hours: Mon - Sat 10:00 PM - 5:00 PM  Fees: Free   Phone: (449) 445-6651 Email: info@[facility].[employer] Website: http://www.[facility].org/     Drop-in center or day shelter  5  [facility] Distance: 0.64 miles      In-Person   [address] [place] [address]  Language: English  Hours: Mon - Fri 12:00 PM - 3:00 PM  Fees: Free   Phone: (466) 563-4850 Email: [employer] ([employer])communTranscriptic@[facility].[employer]mon Website: http://[employer]uel.org/     6  [facility] Distance: 0.87 miles      In-Person   [address] [place] [address]  Language: English, Kosovan, Somali  Hours: Mon - Sat 7:00 AM - 3:00 PM  Fees: Free, Self Pay   Phone: (215) 874-7243 Email: info@[employer].tk Website: https://www.[employer].org/locations/opportunity-center/     Housing search assistance  7  [facility] [employer] [facility] ([facility]) Distance: 1.04 miles      In-Person   [address] [place] [address]  Language: English, Kosovan, Somali  Hours: Mon - Fri 8:30 AM - 4:30 PM  Fees: Free   Phone: (192) 172-1979 Email: robin@[facility].org Website: http://www.[facility] Communicationsmn.org/     8  [facility] - Housing and Shelter - Homeless to Housing Distance: 1.4 miles      Phone/Virtual   [address] [facility] [address]th [place] [address]  Language: English  Hours: Mon - Fri 9:00 AM - 4:00 PM  Fees: Free   Phone: (218) 691-6749 Website: https://www.[facility]/residents#human-services     Shelter for families  9  [facility] Distance: 19.5 miles      In-Person   [address] [place] [address]  Language: English  Hours: Mon - Fri 3:00 PM - 9:00 AM , Sat - Sun Open 24 Hours  Fees: Free   Phone: (324) 143-7154 Ext.1 Website: https://www.[facility].org/2020/07/03/emergency-family-shelter/     Shelter for individuals  10  [facility] Distance: 0.59 miles      In-Person   [address] [place] [address]  Language: English  Hours: Mon - Sun Open 24 Hours  Fees: Free   Phone: (136) 622-4423 Email: communications@[employer].org Website: https://[facility]-[place].org/oursaviourshousing/     11  [facility] Distance: 1.58 miles      In-Person   [address] [place] [address]  Language: English  Hours: Mon - Fri 4:00 PM - 9:00 AM  Fees: Free   Phone: (928) 389-7752 Email: funmilayo@[facility].salvationarmy.org Website: https://[facility].salvationarmy.org/[facility]/          Important Numbers & Websites       Emergency  Services   911  [facility]   311  Poison Control   (485) 845-1161  Suicide Prevention Lifeline   (104) 278-7711 (TALK)  Child Abuse Hotline   (318) 478-7818 (4-A-Child)  Sexual Assault Hotline   (306) 648-6744 (HOPE)  National Runaway Safeline   (851) 291-2724 (RUNAWAY)  All-Options Talkline   (111) 255-6014  Substance Abuse Referral   (128) 620-6869 (HELP)

## 2023-12-18 ENCOUNTER — VIRTUAL VISIT (OUTPATIENT)
Dept: FAMILY MEDICINE | Facility: CLINIC | Age: 43
End: 2023-12-18
Payer: COMMERCIAL

## 2023-12-18 DIAGNOSIS — R11.0 NAUSEA: ICD-10-CM

## 2023-12-18 DIAGNOSIS — E66.812 CLASS 2 OBESITY WITHOUT SERIOUS COMORBIDITY WITH BODY MASS INDEX (BMI) OF 35.0 TO 35.9 IN ADULT, UNSPECIFIED OBESITY TYPE: Primary | ICD-10-CM

## 2023-12-18 PROCEDURE — 99214 OFFICE O/P EST MOD 30 MIN: CPT | Mod: 95 | Performed by: STUDENT IN AN ORGANIZED HEALTH CARE EDUCATION/TRAINING PROGRAM

## 2023-12-18 RX ORDER — ONDANSETRON 4 MG/1
4 TABLET, ORALLY DISINTEGRATING ORAL EVERY 8 HOURS PRN
Qty: 27 TABLET | Refills: 5 | Status: SHIPPED | OUTPATIENT
Start: 2023-12-18 | End: 2024-05-23

## 2023-12-18 NOTE — PROGRESS NOTES
"Bryan is a 43 year old who is being evaluated via a billable video visit.      How would you like to obtain your AVS? MyChart  If the video visit is dropped, the invitation should be resent by: Text to cell phone: [phone]  Will anyone else be joining your video visit? No      Assessment & Plan     Bryan was seen today for medication follow-up.    Diagnoses and all orders for this visit:    Class 2 obesity without serious comorbidity with body mass index (BMI) of 35.0 to 35.9 in adult, unspecified obesity type  -     Discontinue: Semaglutide-Weight Management (WEGOVY) 0.25 MG/0.5ML pen; Inject 0.25 mg Subcutaneous once a week  -     Discontinue: Semaglutide-Weight Management (WEGOVY) 0.5 MG/0.5ML pen; Inject 0.5 mg Subcutaneous once a week  -     Semaglutide-Weight Management (WEGOVY) 0.25 MG/0.5ML pen; Inject 0.25 mg Subcutaneous once a week  -     Semaglutide-Weight Management (WEGOVY) 0.5 MG/0.5ML pen; Inject 0.5 mg Subcutaneous once a week  Pt with chronic weight concerns that are not at his goal. BMI 35. Discussed obesity management and pt qualifies for wegovy. [facility] Pending PA.     Nausea  -     ondansetron (ZOFRAN ODT) 4 MG ODT tab; Take 1 tablet (4 mg) by mouth every 8 hours as needed for nausea    Pt sensitive to nausea dn will provide zofran preventatively.       Prescription drug management        Return in about 4 weeks (around 1/15/2024).    Jovani Montesinos DO  [facility] ASHA Saldana   Bryan is a 43 year old, presenting for the following health issues:  Medication Follow-up (Wegovy )        12/18/2023     9:03 AM   Additional Questions   Roomed by Greg   Accompanied by Self       HPI     Weight    Wt 258 lbs. BMI 35.98.  Has been working out quite a but      Review of Systems      Objective    Vitals - Patient Reported  Weight (Patient Reported): 117 kg (258 lb)  Height (Patient Reported): 180.3 cm (5' 11\")  BMI (Based on Pt Reported Ht/Wt): " 35.98      Vitals:  No vitals were obtained today due to virtual visit.    Physical Exam   GENERAL: Healthy, alert and no distress  EYES: Eyes grossly normal to inspection.  No discharge or erythema, or obvious scleral/conjunctival abnormalities.  RESP: No audible wheeze, cough, or visible cyanosis.  No visible retractions or increased work of breathing.    SKIN: Visible skin clear. No significant rash, abnormal pigmentation or lesions.  NEURO: Cranial nerves grossly intact.  Mentation and speech appropriate for age.  PSYCH: Mentation appears normal, affect normal/bright, judgement and insight intact, normal speech and appearance well-groomed.          Video-Visit Details    Type of service:  Video Visit     Originating Location (pt. Location): Other [facility]    Distant Location (provider location):  On-site  Platform used for Video Visit: Jori

## 2023-12-26 ENCOUNTER — MYC MEDICAL ADVICE (OUTPATIENT)
Dept: FAMILY MEDICINE | Facility: CLINIC | Age: 43
End: 2023-12-26
Payer: COMMERCIAL

## 2023-12-26 DIAGNOSIS — Z86.19 HISTORY OF LATENT SYPHILIS: ICD-10-CM

## 2023-12-26 DIAGNOSIS — Z13.220 LIPID SCREENING: ICD-10-CM

## 2023-12-26 DIAGNOSIS — Z11.4 SCREENING FOR HIV (HUMAN IMMUNODEFICIENCY VIRUS): ICD-10-CM

## 2023-12-26 DIAGNOSIS — E29.1 HYPOGONADISM MALE: Primary | ICD-10-CM

## 2023-12-27 ENCOUNTER — LAB (OUTPATIENT)
Dept: LAB | Facility: CLINIC | Age: 43
End: 2023-12-27
Payer: COMMERCIAL

## 2023-12-27 DIAGNOSIS — Z86.19 HISTORY OF LATENT SYPHILIS: ICD-10-CM

## 2023-12-27 DIAGNOSIS — Z11.4 SCREENING FOR HIV (HUMAN IMMUNODEFICIENCY VIRUS): ICD-10-CM

## 2023-12-27 DIAGNOSIS — Z13.220 LIPID SCREENING: ICD-10-CM

## 2023-12-27 DIAGNOSIS — E29.1 HYPOGONADISM MALE: ICD-10-CM

## 2023-12-27 LAB
CHOLEST SERPL-MCNC: 143 MG/DL
FASTING STATUS PATIENT QL REPORTED: ABNORMAL
HDLC SERPL-MCNC: 26 MG/DL
HIV 1+2 AB+HIV1 P24 AG SERPL QL IA: NONREACTIVE
LDLC SERPL CALC-MCNC: 77 MG/DL
NONHDLC SERPL-MCNC: 117 MG/DL
SHBG SERPL-SCNC: 26 NMOL/L (ref 11–80)
TRIGL SERPL-MCNC: 198 MG/DL

## 2023-12-27 PROCEDURE — 84403 ASSAY OF TOTAL TESTOSTERONE: CPT

## 2023-12-27 PROCEDURE — 87389 HIV-1 AG W/HIV-1&-2 AB AG IA: CPT

## 2023-12-27 PROCEDURE — 84270 ASSAY OF SEX HORMONE GLOBUL: CPT

## 2023-12-27 PROCEDURE — 36415 COLL VENOUS BLD VENIPUNCTURE: CPT

## 2023-12-27 PROCEDURE — 80061 LIPID PANEL: CPT

## 2023-12-27 PROCEDURE — 86593 SYPHILIS TEST NON-TREP QUANT: CPT

## 2023-12-27 PROCEDURE — 86592 SYPHILIS TEST NON-TREP QUAL: CPT

## 2023-12-28 LAB
RPR SER QL: REACTIVE
RPR SER-TITR: ABNORMAL {TITER}

## 2023-12-29 ENCOUNTER — LAB (OUTPATIENT)
Dept: LAB | Facility: CLINIC | Age: 43
End: 2023-12-29
Payer: COMMERCIAL

## 2023-12-29 DIAGNOSIS — E29.1 MALE HYPOGONADISM: Primary | ICD-10-CM

## 2023-12-29 PROCEDURE — 84403 ASSAY OF TOTAL TESTOSTERONE: CPT

## 2023-12-29 PROCEDURE — 36415 COLL VENOUS BLD VENIPUNCTURE: CPT

## 2023-12-31 LAB
TESTOST FREE SERPL-MCNC: 3.57 NG/DL
TESTOST SERPL-MCNC: 166 NG/DL (ref 240–950)

## 2024-01-02 ENCOUNTER — TRANSFERRED RECORDS (OUTPATIENT)
Dept: HEALTH INFORMATION MANAGEMENT | Facility: CLINIC | Age: 44
End: 2024-01-02
Payer: COMMERCIAL

## 2024-01-03 ENCOUNTER — OFFICE VISIT (OUTPATIENT)
Dept: FAMILY MEDICINE | Facility: CLINIC | Age: 44
End: 2024-01-03
Payer: COMMERCIAL

## 2024-01-03 VITALS
DIASTOLIC BLOOD PRESSURE: 77 MMHG | BODY MASS INDEX: 36.68 KG/M2 | HEART RATE: 91 BPM | OXYGEN SATURATION: 98 % | WEIGHT: 263 LBS | SYSTOLIC BLOOD PRESSURE: 130 MMHG

## 2024-01-03 DIAGNOSIS — E29.1 HYPOGONADISM MALE: Primary | ICD-10-CM

## 2024-01-03 DIAGNOSIS — E66.812 CLASS 2 OBESITY WITHOUT SERIOUS COMORBIDITY WITH BODY MASS INDEX (BMI) OF 35.0 TO 35.9 IN ADULT, UNSPECIFIED OBESITY TYPE: ICD-10-CM

## 2024-01-03 PROCEDURE — 99214 OFFICE O/P EST MOD 30 MIN: CPT | Performed by: STUDENT IN AN ORGANIZED HEALTH CARE EDUCATION/TRAINING PROGRAM

## 2024-01-03 NOTE — PROGRESS NOTES
Assessment & Plan     Bryan was seen today for consult.    Diagnoses and all orders for this visit:    Hypogonadism male  -     testosterone cypionate (DEPOTESTOSTERONE) 200 MG/ML injection; Inject 0.3 mLs (60 mg) into the muscle once a week    Symptomatic hypogonadism with 2 lab values below 200 between 8-10 AM. Testosterone replacement therapy indicated. Will initiate weekly dosing for more steady state. Discussed potential side effects of testicular shrinkage. Briefly looked into [facility] medication, but not available in [place] vs too expensive.       Class 2 obesity without serious comorbidity with body mass index (BMI) of 35.0 to 35.9 in adult, unspecified obesity type  -     Semaglutide-Weight Management (WEGOVY) 0.25 MG/0.5ML pen; Inject 0.25 mg Subcutaneous once a week  -     Semaglutide-Weight Management (WEGOVY) 0.5 MG/0.5ML pen; Inject 0.5 mg Subcutaneous once a week  Reordered to different pharmacy. Supply issue.         Prescription drug management      Return in about 2 months (around 3/3/2024). For TRT check in.      Jovani Montesinos [facility] ASHA    Shana Adams is a 43 year old, presenting for the following health issues:  Consult (Pt here to discuss getting back on testocerone therapy..)      HPI     Labs follow up    Difficulty finding wegovy at pharmacies.    Low T  - Fatigue, sexual side effects, low mood.          Objective    /77   Pulse 91   Wt 119.3 kg (263 lb)   SpO2 98%   BMI 36.68 kg/m    Body mass index is 36.68 kg/m .  Physical Exam   General: Alert and oriented, in no acute distress.  Skin: Warm and dry, no abnormalities noted.  Eyes: Extra-ocular muscles grossly intact, pupils equal.  ENT: Speech intact, nasal passages open, no hearing impairment noted.  CV: No cyanosis or pallor, warm and well perfused.  Respiratory: No respiratory distress, no accessory muscle use.  Neuro: Gait and station normal, comprehension intact. Gross and fine motor skills intact.    Psychiatric: Mood and affect appear baseline  Extremities: Warm, able to move all four extremities at will.         Component Date Value Ref Range Status    Testosterone Total 12/29/2023 9:44AM 152 (L)  240 - 950 ng/dL Final     Testosterone Total 12/27/2023  8:17AM 166 (L)  240 - 950 ng/dL Final

## 2024-01-04 ENCOUNTER — MYC REFILL (OUTPATIENT)
Dept: FAMILY MEDICINE | Facility: CLINIC | Age: 44
End: 2024-01-04
Payer: COMMERCIAL

## 2024-01-04 ENCOUNTER — MYC MEDICAL ADVICE (OUTPATIENT)
Dept: FAMILY MEDICINE | Facility: CLINIC | Age: 44
End: 2024-01-04
Payer: COMMERCIAL

## 2024-01-04 DIAGNOSIS — Z20.6 CONTACT WITH AND (SUSPECTED) EXPOSURE TO HUMAN IMMUNODEFICIENCY VIRUS (HIV): ICD-10-CM

## 2024-01-04 LAB — TESTOST SERPL-MCNC: 152 NG/DL (ref 240–950)

## 2024-01-04 RX ORDER — TESTOSTERONE CYPIONATE 200 MG/ML
60 INJECTION, SOLUTION INTRAMUSCULAR WEEKLY
Qty: 4 ML | Refills: 1 | Status: SHIPPED | OUTPATIENT
Start: 2024-01-04 | End: 2024-02-13

## 2024-01-05 RX ORDER — EMTRICITABINE AND TENOFOVIR DISOPROXIL FUMARATE 200; 300 MG/1; MG/1
1 TABLET, FILM COATED ORAL DAILY
Qty: 90 TABLET | Refills: 0 | Status: SHIPPED | OUTPATIENT
Start: 2024-01-05 | End: 2024-02-13

## 2024-01-05 NOTE — TELEPHONE ENCOUNTER
"Request for medication refill:  emtricitabine-tenofovir (TRUVADA) 200-300 MG per tablet     Providers if patient needs an appointment and you are willing to give a one month supply please refill for one month and  send a letter/MyChart using \".SMILLIMITEDREFILL\" .smillimited and route chart to \"P [facility] \" (Giving one month refill in non controlled medications is strongly recommended before denial)    If refill has been denied, meaning absolutely no refills without visit, please complete the smart phrase \".smirxrefuse\" and route it to the \"P [facility] MED REFILLS\"  pool to inform the patient and the pharmacy.    Roseline Mosher CMA      "

## 2024-01-09 NOTE — TELEPHONE ENCOUNTER
[facility] and spoke with Yoselin, who looked it up and said that script was transferred to [facility], their specialty pharmacy, He contacted them and put Luisa on the line, she said that the script was processed on 1/6/24 and should arrive to patients home by 1/10/24. I sent patient My Chart message.    Laisha Archer RN

## 2024-01-23 ENCOUNTER — ANCILLARY PROCEDURE (OUTPATIENT)
Dept: GENERAL RADIOLOGY | Facility: CLINIC | Age: 44
End: 2024-01-23
Attending: PHYSICIAN ASSISTANT
Payer: COMMERCIAL

## 2024-01-23 ENCOUNTER — OFFICE VISIT (OUTPATIENT)
Dept: URGENT CARE | Facility: URGENT CARE | Age: 44
End: 2024-01-23
Payer: COMMERCIAL

## 2024-01-23 VITALS
WEIGHT: 260 LBS | OXYGEN SATURATION: 98 % | TEMPERATURE: 98.4 F | HEART RATE: 77 BPM | RESPIRATION RATE: 17 BRPM | BODY MASS INDEX: 36.4 KG/M2 | HEIGHT: 71 IN | DIASTOLIC BLOOD PRESSURE: 80 MMHG | SYSTOLIC BLOOD PRESSURE: 127 MMHG

## 2024-01-23 DIAGNOSIS — M79.672 LEFT FOOT PAIN: ICD-10-CM

## 2024-01-23 DIAGNOSIS — M76.60 PAIN IN ACHILLES TENDON: Primary | ICD-10-CM

## 2024-01-23 PROCEDURE — 99214 OFFICE O/P EST MOD 30 MIN: CPT | Performed by: PHYSICIAN ASSISTANT

## 2024-01-23 PROCEDURE — 73610 X-RAY EXAM OF ANKLE: CPT | Mod: TC | Performed by: STUDENT IN AN ORGANIZED HEALTH CARE EDUCATION/TRAINING PROGRAM

## 2024-01-23 PROCEDURE — 73630 X-RAY EXAM OF FOOT: CPT | Mod: TC | Performed by: STUDENT IN AN ORGANIZED HEALTH CARE EDUCATION/TRAINING PROGRAM

## 2024-01-23 NOTE — PROGRESS NOTES
SUBJECTIVE:  Chief Complaint   Patient presents with    Ankle Pain     Slipped on ice x1 day ago, pain, swelling, hurts to walk on left ankle     Foot Pain     Pain in left foot going down to big toe      Bryan Salazar is a 43 year old male presents with a chief complaint of left ankle and foot pain.  The injury occurred 1 day(s) ago.   The injury happened while at home. How: slipped on black ice. The patient complained of moderate pain  and has not had decreased ROM.  Pain exacerbated by weight-bearing.  Relieved by rest and ice.  He treated it initially with Tylenol and Ibuprofen. This is the first time this type of injury has occurred to this patient.     Past Medical History:   Diagnosis Date    Hypertension      Current Outpatient Medications   Medication Sig Dispense Refill    cyclobenzaprine (FLEXERIL) 5 MG tablet Take 1-2 tablets (5-10 mg) by mouth 3 times daily as needed for muscle spasms 90 tablet 3    emtricitabine-tenofovir (TRUVADA) 200-300 MG per tablet Take 1 tablet by mouth daily 90 tablet 0    famotidine (PEPCID) 40 MG tablet TAKE 1 TABLET TWICE A  tablet 3    finasteride (PROPECIA) 1 MG tablet Take 1 tablet (1 mg) by mouth daily 90 tablet 3    fluticasone (FLONASE) 50 MCG/ACT nasal spray Spray 1 spray into both nostrils daily 16 g 11    lisinopril (ZESTRIL) 5 MG tablet Take 1 tablet (5 mg) by mouth daily 90 tablet 3    ondansetron (ZOFRAN ODT) 4 MG ODT tab Take 1 tablet (4 mg) by mouth every 8 hours as needed for nausea 27 tablet 5    [START ON 1/31/2024] Semaglutide-Weight Management (WEGOVY) 0.5 MG/0.5ML pen Inject 0.5 mg Subcutaneous once a week 2 mL 0    sertraline (ZOLOFT) 50 MG tablet Take 1 tablet (50 mg) by mouth daily 90 tablet 3    tadalafil (CIALIS) 5 MG tablet Take 1 tablet (5 mg) by mouth every 24 hours 90 tablet 3    testosterone cypionate (DEPOTESTOSTERONE) 200 MG/ML injection Inject 0.3 mLs (60 mg) into the muscle once a week 4 mL 1    testosterone cypionate (DEPOTESTOSTERONE)  "200 MG/ML injection Inject 1 mL (200 mg) into the muscle once a week      traZODone (DESYREL) 50 MG tablet TAKE 1-2 TABLETS BY MOUTH AT BEDTIME. 180 tablet 3    Semaglutide-Weight Management (WEGOVY) 0.25 MG/0.5ML pen Inject 0.25 mg Subcutaneous once a week 2 mL 0     Social History     Tobacco Use    Smoking status: Former     Types: Cigarettes     Quit date: 2018     Years since quittin.0    Smokeless tobacco: Never   Substance Use Topics    Alcohol use: Yes     Comment: 1-2 drinks a month       ROS:  Review of systems negative except as stated above.    EXAM:   /80   Pulse 77   Temp 98.4  F (36.9  C) (Temporal)   Resp 17   Ht 1.803 m (5' 11\")   Wt 117.9 kg (260 lb)   SpO2 98%   BMI 36.26 kg/m    Gen: healthy,alert,no distress  Extremity: ankle has tenderness at achilles insertion.   There is not compromise to the distal circulation.  Pulses are +2 and CRT is brisk  GENERAL APPEARANCE: healthy, alert and no distress  EXTREMITIES: peripheral pulses normal  MS: no gross deformities noted, no evidence of inflammation in joints, FROM in all extremities.  SKIN: no suspicious lesions or rashes  NEURO: Normal strength and tone, sensory exam grossly normal, mentation intact and speech normal      Results for orders placed or performed in visit on 24   XR Ankle Left G/E 3 Views     Status: None    Narrative    XR FOOT LEFT G/E 3 VIEWS, XR ANKLE LEFT G/E 3 VIEWS 2024 2:15 PM     HISTORY: Left foot/ankle pain    COMPARISON: None.       Impression    IMPRESSION:    Normal joint spaces and alignment. No fracture. Soft tissue swelling  over the anterior aspect of the ankle. Small plantar and  retrocalcaneal enthesophytes.    MARSHA DO MD         SYSTEM ID:  [id number]   Results for orders placed or performed in visit on 24   XR Foot Left G/E 3 Views     Status: None    Narrative    XR FOOT LEFT G/E 3 VIEWS, XR ANKLE LEFT G/E 3 VIEWS 2024 2:15 PM     HISTORY: Left foot/ankle " pain    COMPARISON: None.       Impression    IMPRESSION:    Normal joint spaces and alignment. No fracture. Soft tissue swelling  over the anterior aspect of the ankle. Small plantar and  retrocalcaneal enthesophytes.    MARSHA DO MD         SYSTEM ID:  [id number]         ASSESSMENT:   (M76.60) Pain in Achilles tendon  (primary encounter diagnosis)  (M79.672) Left foot pain  Comment: concern for partial strain  Plan: XR Foot Left G/E 3 Views, XR Ankle Left G/E 3         Views, Ankle/Foot Bracing Supplies Order         Walking Boot; Left; Pneumatic; Tall, Orthopedic         Referral      Red flags and emergent follow up discussed, and understood by patient  Follow up with PCP if symptoms worsen or fail to improve

## 2024-01-25 ENCOUNTER — MYC MEDICAL ADVICE (OUTPATIENT)
Dept: FAMILY MEDICINE | Facility: CLINIC | Age: 44
End: 2024-01-25
Payer: COMMERCIAL

## 2024-01-25 DIAGNOSIS — E66.01 MORBID OBESITY (H): Primary | Chronic | ICD-10-CM

## 2024-01-31 ENCOUNTER — PRE VISIT (OUTPATIENT)
Dept: ORTHOPEDICS | Facility: CLINIC | Age: 44
End: 2024-01-31

## 2024-01-31 ENCOUNTER — OFFICE VISIT (OUTPATIENT)
Dept: ORTHOPEDICS | Facility: CLINIC | Age: 44
End: 2024-01-31
Payer: COMMERCIAL

## 2024-01-31 DIAGNOSIS — S99.912A LEFT ANKLE INJURY, INITIAL ENCOUNTER: Primary | ICD-10-CM

## 2024-01-31 PROCEDURE — 99203 OFFICE O/P NEW LOW 30 MIN: CPT | Performed by: FAMILY MEDICINE

## 2024-01-31 NOTE — LETTER
[facility] [address]  4TH FLOOR  [facility] [address]  [phone]        January 31, 2024    Regarding:  Bryan Salazar  [address]  [facility] [address]              To Whom It May Concern;    This patient was seen in sports medicine clinic today for his left ankle injury that occurred 1/22/2024.  He has favorable x-ray findings, and his ankle is improving.  He plans on weaning himself from the cam walking boot over the next 2 weeks, and continue to advance his weightbearing as tolerated.  He is not required to limit his weightbearing at this time.          Sincerely,        Deandre Monroe MD

## 2024-01-31 NOTE — LETTER
1/31/2024      RE: Bryan Salazar  [address]  [facility] [address]     Dear Colleague,    Thank you for referring your patient, Bryan Salazar, to the [facility] [place]. Please see a copy of my visit note below.    Sports Medicine Clinic Visit    PCP: Jovani Montesinos    Bryan Salazar is a 43 year old male who is seen  in consultation at the request of Dr. Augustine presenting with left ankle.     Injury: Patient reports that on 1/22/24 he was taking out the trash and slipped on ice and twisted his ankle     Location of Pain: left ankle; achilles   Duration of Pain: 9 days  Rating of Pain: 1/10  Pain is better with: Walking boot   Pain is worse with: Weightbearing   Additional Features: Swelling   Treatment so far consists of: Walking boot   Prior History of related problems: None    There were no vitals taken for this visit.             Imaging study below reviewed by me  XR FOOT LEFT G/E 3 VIEWS, XR ANKLE LEFT G/E 3 VIEWS 1/23/2024 2:15 PM      HISTORY: Left foot/ankle pain     COMPARISON: None.                                                                       IMPRESSION:     Normal joint spaces and alignment. No fracture. Soft tissue swelling  over the anterior aspect of the ankle. Small plantar and  retrocalcaneal enthesophytes.     MARSHA DO MD       PMH:  Past Medical History:   Diagnosis Date     Hypertension        Active problem list:  Patient Active Problem List   Diagnosis     PrEP Candidate     Essential hypertension     BRENDA (obstructive sleep apnea)     Hypogonadism male     History of latent syphilis     ADHD (attention deficit hyperactivity disorder), combined type     Seasonal affective disorder (H24)     Insomnia     Morbid obesity (H)       FH:  Family History   Problem Relation Age of Onset     Breast Cancer Mother      Hypertension Father      Hyperlipidemia Father      Arthritis Sister      Arthritis Maternal Grandmother      Lung Cancer Maternal  Grandfather      Arthritis Maternal Grandfather      Arthritis Paternal Grandmother      Prostate Cancer Paternal Grandfather      Arthritis Paternal Grandfather      Diabetes Paternal Uncle      Coronary Artery Disease No family hx of      Heart Disease No family hx of      Kidney Disease No family hx of      Asthma No family hx of      Thrombosis No family hx of      Thyroid Disease No family hx of        SH:  Social History     Socioeconomic History     Marital status: Single     Spouse name: Not on file     Number of children: Not on file     Years of education: Not on file     Highest education level: Not on file   Occupational History     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[employer]   Tobacco Use     Smoking status: Former     Types: Cigarettes     Quit date: 2018     Years since quittin.0     Smokeless tobacco: Never   Vaping Use     Vaping Use: Never used   Substance and Sexual Activity     Alcohol use: Yes     Comment: 1-2 drinks a month     Drug use: Not Currently     Sexual activity: Yes     Partners: Male   Other Topics Concern     Not on file   Social History Narrative    Exec Assistant- working remote for company in [place]      Social Determinants of Health     Financial Resource Strain: Low Risk  (2024)    Financial Resource Strain      Within the past 12 months, have you or your family members you live with been unable to get utilities (heat, electricity) when it was really needed?: No   Food Insecurity: Low Risk  (2024)    Food Insecurity      Within the past 12 months, did you worry that your food would run out before you got money to buy more?: No      Within the past 12 months, did the food you bought just not last and you didn t have money to get more?: No   Transportation Needs: Low Risk  (2024)    Transportation Needs      Within the past 12 months, has lack of transportation kept you from medical appointments, getting your medicines, non-medical meetings or appointments, work, or from  getting things that you need?: No   Physical Activity: Not on file   Stress: Not on file   Social Connections: Not on file   Interpersonal Safety: Low Risk  (10/18/2023)    Interpersonal Safety      Do you feel physically and emotionally safe where you currently live?: Yes      Within the past 12 months, have you been hit, slapped, kicked or otherwise physically hurt by someone?: No      Within the past 12 months, have you been humiliated or emotionally abused in other ways by your partner or ex-partner?: No   Housing Stability: Low Risk  (1/2/2024)    Housing Stability      Do you have housing? : Yes      Are you worried about losing your housing?: No   Recent Concern: Housing Stability - High Risk (12/11/2023)    Housing Stability      Do you have housing? : No      Are you worried about losing your housing?: No       MEDS:  See EMR, reviewed  ALL:  See EMR, reviewed    REVIEW OF SYSTEMS:  CONSTITUTIONAL:NEGATIVE for fever, chills, change in weight  INTEGUMENTARY/SKIN: NEGATIVE for worrisome rashes, moles or lesions  EYES: NEGATIVE for vision changes or irritation  ENT/MOUTH: NEGATIVE for ear, mouth and throat problems  RESP:NEGATIVE for significant cough or SOB  BREAST: NEGATIVE for masses, tenderness or discharge  CV: NEGATIVE for chest pain, palpitations or peripheral edema  GI: NEGATIVE for nausea, abdominal pain, heartburn, or change in bowel habits  :NEGATIVE for frequency, dysuria, or hematuria  :NEGATIVE for frequency, dysuria, or hematuria  NEURO: NEGATIVE for weakness, dizziness or paresthesias  ENDOCRINE: NEGATIVE for temperature intolerance, skin/hair changes  HEME/ALLERGY/IMMUNE: NEGATIVE for bleeding problems  PSYCHIATRIC: NEGATIVE for changes in mood or affect    Objective: No discoloration about the ankle, no significant swelling noted.  Nontender over the anterior talofibular, calcaneofibular or posterior talofibular ligament.  Nontender at the syndesmosis.  Nontender over the deltoid.   Nontender over the distal tibia or distal fibula.  Nontender over the navicular or the area of Lisfranc.  Nontender about the fifth metatarsal.  Nontender at the proximal fibula at the knee.  Nontender along the course of the Achilles tendon including at the base of the heel.  Squeeze test of the calcaneus is negative.  In the prone position on the table squeeze test of the calf shows that the Achilles tendon is intact.  Overlying skin is normal.  Appropriate in conversation and affect.    Personally viewed with the patient the previous x-rays of the ankle that show no fracture.  He has nonspecific calcification at the distal Achilles tendon attachment with no acute fracture noted.    Assessment: Left-sided ankle discomfort, improving    Plan: Patient is tolerating weightbearing and he feels that his ankle pain is improving.  I encouraged him to wean from the cam walker boot over the next 2 weeks as tolerated and he was taught some early range of motion exercises and balance exercises and proprioception exercises.  We discussed the option of a lace up ankle brace.  A note was provided for work.  He will follow-up in the next 4 weeks if not improving.  He declines the need for physical therapy today.                    Again, thank you for allowing me to participate in the care of your patient.      Sincerely,    Deandre Monroe MD

## 2024-01-31 NOTE — PROGRESS NOTES
Sports Medicine Clinic Visit    PCP: Jovani Montesinos    Bryan Salazar is a 43 year old male who is seen  in consultation at the request of Dr. Augustine presenting with left ankle.     Injury: Patient reports that on 1/22/24 he was taking out the trash and slipped on ice and twisted his ankle     Location of Pain: left ankle; achilles   Duration of Pain: 9 days  Rating of Pain: 1/10  Pain is better with: Walking boot   Pain is worse with: Weightbearing   Additional Features: Swelling   Treatment so far consists of: Walking boot   Prior History of related problems: None    There were no vitals taken for this visit.             Imaging study below reviewed by me  XR FOOT LEFT G/E 3 VIEWS, XR ANKLE LEFT G/E 3 VIEWS 1/23/2024 2:15 PM      HISTORY: Left foot/ankle pain     COMPARISON: None.                                                                       IMPRESSION:     Normal joint spaces and alignment. No fracture. Soft tissue swelling  over the anterior aspect of the ankle. Small plantar and  retrocalcaneal enthesophytes.     MARSHA DO MD       PMH:  Past Medical History:   Diagnosis Date    Hypertension        Active problem list:  Patient Active Problem List   Diagnosis    PrEP Candidate    Essential hypertension    BRENDA (obstructive sleep apnea)    Hypogonadism male    History of latent syphilis    ADHD (attention deficit hyperactivity disorder), combined type    Seasonal affective disorder (H24)    Insomnia    Morbid obesity (H)       FH:  Family History   Problem Relation Age of Onset    Breast Cancer Mother     Hypertension Father     Hyperlipidemia Father     Arthritis Sister     Arthritis Maternal Grandmother     Lung Cancer Maternal Grandfather     Arthritis Maternal Grandfather     Arthritis Paternal Grandmother     Prostate Cancer Paternal Grandfather     Arthritis Paternal Grandfather     Diabetes Paternal Uncle     Coronary Artery Disease No family hx of     Heart Disease No family hx of     Kidney  Disease No family hx of     Asthma No family hx of     Thrombosis No family hx of     Thyroid Disease No family hx of        SH:  Social History     Socioeconomic History    Marital status: Single     Spouse name: Not on file    Number of children: Not on file    Years of education: Not on file    Highest education level: Not on file   Occupational History    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[employer]   Tobacco Use    Smoking status: Former     Types: Cigarettes     Quit date: 2018     Years since quittin.0    Smokeless tobacco: Never   Vaping Use    Vaping Use: Never used   Substance and Sexual Activity    Alcohol use: Yes     Comment: 1-2 drinks a month    Drug use: Not Currently    Sexual activity: Yes     Partners: Male   Other Topics Concern    Not on file   Social History Narrative    Exec Assistant- working remote for company in [place]      Social Determinants of Health     Financial Resource Strain: Low Risk  (2024)    Financial Resource Strain     Within the past 12 months, have you or your family members you live with been unable to get utilities (heat, electricity) when it was really needed?: No   Food Insecurity: Low Risk  (2024)    Food Insecurity     Within the past 12 months, did you worry that your food would run out before you got money to buy more?: No     Within the past 12 months, did the food you bought just not last and you didn t have money to get more?: No   Transportation Needs: Low Risk  (2024)    Transportation Needs     Within the past 12 months, has lack of transportation kept you from medical appointments, getting your medicines, non-medical meetings or appointments, work, or from getting things that you need?: No   Physical Activity: Not on file   Stress: Not on file   Social Connections: Not on file   Interpersonal Safety: Low Risk  (10/18/2023)    Interpersonal Safety     Do you feel physically and emotionally safe where you currently live?: Yes     Within the past 12 months,  have you been hit, slapped, kicked or otherwise physically hurt by someone?: No     Within the past 12 months, have you been humiliated or emotionally abused in other ways by your partner or ex-partner?: No   Housing Stability: Low Risk  (1/2/2024)    Housing Stability     Do you have housing? : Yes     Are you worried about losing your housing?: No   Recent Concern: Housing Stability - High Risk (12/11/2023)    Housing Stability     Do you have housing? : No     Are you worried about losing your housing?: No       MEDS:  See EMR, reviewed  ALL:  See EMR, reviewed    REVIEW OF SYSTEMS:  CONSTITUTIONAL:NEGATIVE for fever, chills, change in weight  INTEGUMENTARY/SKIN: NEGATIVE for worrisome rashes, moles or lesions  EYES: NEGATIVE for vision changes or irritation  ENT/MOUTH: NEGATIVE for ear, mouth and throat problems  RESP:NEGATIVE for significant cough or SOB  BREAST: NEGATIVE for masses, tenderness or discharge  CV: NEGATIVE for chest pain, palpitations or peripheral edema  GI: NEGATIVE for nausea, abdominal pain, heartburn, or change in bowel habits  :NEGATIVE for frequency, dysuria, or hematuria  :NEGATIVE for frequency, dysuria, or hematuria  NEURO: NEGATIVE for weakness, dizziness or paresthesias  ENDOCRINE: NEGATIVE for temperature intolerance, skin/hair changes  HEME/ALLERGY/IMMUNE: NEGATIVE for bleeding problems  PSYCHIATRIC: NEGATIVE for changes in mood or affect    Objective: No discoloration about the ankle, no significant swelling noted.  Nontender over the anterior talofibular, calcaneofibular or posterior talofibular ligament.  Nontender at the syndesmosis.  Nontender over the deltoid.  Nontender over the distal tibia or distal fibula.  Nontender over the navicular or the area of Lisfranc.  Nontender about the fifth metatarsal.  Nontender at the proximal fibula at the knee.  Nontender along the course of the Achilles tendon including at the base of the heel.  Squeeze test of the calcaneus is  negative.  In the prone position on the table squeeze test of the calf shows that the Achilles tendon is intact.  Overlying skin is normal.  Appropriate in conversation and affect.    Personally viewed with the patient the previous x-rays of the ankle that show no fracture.  He has nonspecific calcification at the distal Achilles tendon attachment with no acute fracture noted.    Assessment: Left-sided ankle discomfort, improving    Plan: Patient is tolerating weightbearing and he feels that his ankle pain is improving.  I encouraged him to wean from the cam walker boot over the next 2 weeks as tolerated and he was taught some early range of motion exercises and balance exercises and proprioception exercises.  We discussed the option of a lace up ankle brace.  A note was provided for work.  He will follow-up in the next 4 weeks if not improving.  He declines the need for physical therapy today.

## 2024-02-02 ENCOUNTER — VIRTUAL VISIT (OUTPATIENT)
Dept: FAMILY MEDICINE | Facility: CLINIC | Age: 44
End: 2024-02-02
Payer: COMMERCIAL

## 2024-02-02 DIAGNOSIS — G47.33 OSA (OBSTRUCTIVE SLEEP APNEA): ICD-10-CM

## 2024-02-02 DIAGNOSIS — F90.2 ADHD (ATTENTION DEFICIT HYPERACTIVITY DISORDER), COMBINED TYPE: Primary | ICD-10-CM

## 2024-02-02 PROCEDURE — 99214 OFFICE O/P EST MOD 30 MIN: CPT | Mod: 95

## 2024-02-02 RX ORDER — METHYLPHENIDATE HYDROCHLORIDE 36 MG/1
36 TABLET ORAL EVERY MORNING
Qty: 30 TABLET | Refills: 0 | Status: SHIPPED | OUTPATIENT
Start: 2024-02-02 | End: 2024-02-13

## 2024-02-02 NOTE — PROGRESS NOTES
Bryan is a 43 year old who is being evaluated via a billable video visit.      How would you like to obtain your AVS? MyChart  If the video visit is dropped, the invitation should be resent by: Text to cell phone: [phone]  Will anyone else be joining your video visit? No        Assessment & Plan     ADHD (attention deficit hyperactivity disorder), combined type  Not responding as well to adderall XR 30mg since mid-December, Bryan and those around him are noticing that it isn't working as well. Has tried adderall 20mg XR, adderall 30mg XR, and vyvanse 40mg since first starting medication in May 2022. Would like to try something new, friends have had good results with concerta. Discussed that executive functioning and ADHD symptoms are multifactorial; in this case concerned that untreated obstructive sleep apnea is contributing to symptom burden.   - methylphenidate HCl ER, OSM, (CONCERTA) 36 MG CR tablet; Take 1 tablet (36 mg) by mouth every morning for 30 days    BRENDA (obstructive sleep apnea)  Previously diagnosed, last saw sleep medicine Jan 2023. Has not tolerated CPAP in the past. Was recommended to try mandibular advancement device. Was due to follow-up in April 2023. Will place new referral given has not seen provider in slightly over one year. Discussed that, in some cases, ADHD symptoms are largely or entirely due to obstructive sleep apnea. If able to get this under control, ADHD may become easier to manage.   - Adult Sleep Eval & Management Referral; Future    No follow-ups on file.    Subjective   Bryan is a 43 year old, presenting for the following health issues:  Follow Up (Medication follow up )      2/2/2024     2:35 PM   Additional Questions   Roomed by Lizzie CISNEROS     Noticed last month or so adderall isn't working  as well. Trouble focusing, staying on task. Fidgeting, talking really fast. Wondering if he has developed tolerance and needs to increase dose.     Currently on adderall 30mg XR.  Takes that in the morning - around 8-9am on days off; 4:30am on work days. Noticed it not working as well since mid-December. Takes it most days, trying to not take it on non-work days last few weeks.    Has some friends who switched to concerta, who had benefit with that.     Trouble sleeping lately. Sleep-onset insomnia. Taking trazodone sometimes.     Severe untreated BRENDA. Has tried CPAP but didn't tolerate. Recently got an adjustible bed base, sleeping elevated.         Objective         Vitals:  No vitals were obtained today due to virtual visit.    Physical Exam   GENERAL: alert and no distress  EYES: Eyes grossly normal to inspection.  No discharge or erythema, or obvious scleral/conjunctival abnormalities.  RESP: No audible wheeze, cough, or visible cyanosis.    SKIN: Visible skin clear. No significant rash, abnormal pigmentation or lesions.  NEURO: Cranial nerves grossly intact.  Mentation and speech appropriate for age.  PSYCH: Appropriate affect, tone, and pace of words        Video-Visit Details    Type of service:  Video Visit     Originating Location (pt. Location): Home    Distant Location (provider location):  On-site  Platform used for Video Visit: Jori  Signed Electronically by: DELMY Power MD

## 2024-02-02 NOTE — PROGRESS NOTES
Preceptor Attestation:    I discussed the patient with the resident and evaluated the patient in person. I have verified the content of the note, which accurately reflects my assessment of the patient and the plan of care.   Supervising Physician:  Francisco J Pratt MD, MD.

## 2024-02-02 NOTE — PATIENT INSTRUCTIONS
Patient Education   Here is the plan from today's visit    1. ADHD (attention deficit hyperactivity disorder), combined type  - methylphenidate HCl ER, OSM, (CONCERTA) 36 MG CR tablet; Take 1 tablet (36 mg) by mouth every morning for 30 days  Dispense: 30 tablet; Refill: 0    2. BRENDA (obstructive sleep apnea  - Adult Sleep Eval & Management Referral; Future      Please call or return to clinic if your symptoms don't go away.    Follow up plan  Return in about 4 weeks (around 3/1/2024) for Follow up ADHD medication .    Thank you for coming to [facility] today.  Lab Testing:  **If you had lab testing today and your results are reassuring or normal they will be mailed to you or sent through [employer] within 7 days.   **If the lab tests need quick action we will call you with the results.  **If you are having labs done on a different day, please call [phone] to schedule at [facility] or [phone] for other [facility] locations. Labs do not offer walk-in appointments.  The phone number we will call with results is # [phone] (home) . If this is not the best number please call our clinic and change the number.  Medication Refills:  If you need any refills please call your pharmacy and they will contact us.   If you need to  your refill at a new pharmacy, please contact the new pharmacy directly. The new pharmacy will help you get your medications transferred faster.   Scheduling:  If you have any concerns about today's visit or wish to schedule another appointment please call our office during normal business hours [phone] (8-5:00 M-F). If you can no longer make a scheduled visit, please cancel via [employer] or call us to cancel.   If a referral was made to an [facility] specialty provider and you do not get a call from central scheduling, please refer to directions on your visit summary or call our office during normal business hours for assistance.   If a Mammogram  was ordered for you at the [facility] call [phone] to schedule or change your appointment.  If you had an XRay/CT/Ultrasound/MRI ordered the number is [phone] to schedule or change your radiology appointment.   [facility] has limited ultrasound appointments available on Wednesdays, if you would like your ultrasound at [facility], please call [phone] to schedule.   Medical Concerns:  If you have urgent medical concerns please call [phone] at any time of the day.    DELMY Power MD

## 2024-02-08 ENCOUNTER — LAB (OUTPATIENT)
Dept: LAB | Facility: CLINIC | Age: 44
End: 2024-02-08
Payer: COMMERCIAL

## 2024-02-08 DIAGNOSIS — E29.1 HYPOGONADISM MALE: ICD-10-CM

## 2024-02-08 DIAGNOSIS — Z11.4 SCREENING FOR HIV (HUMAN IMMUNODEFICIENCY VIRUS): ICD-10-CM

## 2024-02-08 PROCEDURE — 36415 COLL VENOUS BLD VENIPUNCTURE: CPT

## 2024-02-08 PROCEDURE — 87389 HIV-1 AG W/HIV-1&-2 AB AG IA: CPT

## 2024-02-08 PROCEDURE — 84403 ASSAY OF TOTAL TESTOSTERONE: CPT

## 2024-02-08 PROCEDURE — 84270 ASSAY OF SEX HORMONE GLOBUL: CPT

## 2024-02-09 LAB
HIV 1+2 AB+HIV1 P24 AG SERPL QL IA: NONREACTIVE
SHBG SERPL-SCNC: 22 NMOL/L (ref 11–80)

## 2024-02-10 LAB
TESTOST FREE SERPL-MCNC: 13.9 NG/DL
TESTOST SERPL-MCNC: 532 NG/DL (ref 240–950)

## 2024-02-13 ENCOUNTER — OFFICE VISIT (OUTPATIENT)
Dept: FAMILY MEDICINE | Facility: CLINIC | Age: 44
End: 2024-02-13
Payer: COMMERCIAL

## 2024-02-13 VITALS
DIASTOLIC BLOOD PRESSURE: 85 MMHG | HEIGHT: 71 IN | HEART RATE: 109 BPM | TEMPERATURE: 98.3 F | WEIGHT: 268.3 LBS | BODY MASS INDEX: 37.56 KG/M2 | SYSTOLIC BLOOD PRESSURE: 130 MMHG | OXYGEN SATURATION: 97 %

## 2024-02-13 DIAGNOSIS — E29.1 HYPOGONADISM MALE: Primary | ICD-10-CM

## 2024-02-13 DIAGNOSIS — F33.8 SEASONAL AFFECTIVE DISORDER (H): ICD-10-CM

## 2024-02-13 DIAGNOSIS — E66.01 MORBID OBESITY (H): ICD-10-CM

## 2024-02-13 DIAGNOSIS — F90.2 ADHD (ATTENTION DEFICIT HYPERACTIVITY DISORDER), COMBINED TYPE: ICD-10-CM

## 2024-02-13 DIAGNOSIS — Z20.6 CONTACT WITH AND (SUSPECTED) EXPOSURE TO HUMAN IMMUNODEFICIENCY VIRUS (HIV): ICD-10-CM

## 2024-02-13 PROCEDURE — 99214 OFFICE O/P EST MOD 30 MIN: CPT | Performed by: STUDENT IN AN ORGANIZED HEALTH CARE EDUCATION/TRAINING PROGRAM

## 2024-02-13 RX ORDER — MINOXIDIL 10 MG/1
2 TABLET ORAL
COMMUNITY
Start: 2023-12-29 | End: 2024-02-13

## 2024-02-13 RX ORDER — EMTRICITABINE AND TENOFOVIR DISOPROXIL FUMARATE 200; 300 MG/1; MG/1
1 TABLET, FILM COATED ORAL DAILY
Qty: 90 TABLET | Refills: 0 | Status: SHIPPED | OUTPATIENT
Start: 2024-02-13 | End: 2024-05-22

## 2024-02-13 RX ORDER — TESTOSTERONE CYPIONATE 200 MG/ML
80 INJECTION, SOLUTION INTRAMUSCULAR WEEKLY
Qty: 12 ML | Refills: 1 | Status: SHIPPED | OUTPATIENT
Start: 2024-02-13 | End: 2024-04-15

## 2024-02-13 RX ORDER — METHYLPHENIDATE HYDROCHLORIDE 36 MG/1
36 TABLET ORAL EVERY MORNING
Qty: 30 TABLET | Refills: 0 | Status: SHIPPED | OUTPATIENT
Start: 2024-03-04 | End: 2024-03-26 | Stop reason: DRUGHIGH

## 2024-02-13 ASSESSMENT — ANXIETY QUESTIONNAIRES
2. NOT BEING ABLE TO STOP OR CONTROL WORRYING: NOT AT ALL
1. FEELING NERVOUS, ANXIOUS, OR ON EDGE: NOT AT ALL
7. FEELING AFRAID AS IF SOMETHING AWFUL MIGHT HAPPEN: NOT AT ALL
IF YOU CHECKED OFF ANY PROBLEMS ON THIS QUESTIONNAIRE, HOW DIFFICULT HAVE THESE PROBLEMS MADE IT FOR YOU TO DO YOUR WORK, TAKE CARE OF THINGS AT HOME, OR GET ALONG WITH OTHER PEOPLE: NOT DIFFICULT AT ALL
3. WORRYING TOO MUCH ABOUT DIFFERENT THINGS: NOT AT ALL
GAD7 TOTAL SCORE: 1
GAD7 TOTAL SCORE: 1
5. BEING SO RESTLESS THAT IT IS HARD TO SIT STILL: SEVERAL DAYS
6. BECOMING EASILY ANNOYED OR IRRITABLE: NOT AT ALL

## 2024-02-13 ASSESSMENT — PATIENT HEALTH QUESTIONNAIRE - PHQ9
SUM OF ALL RESPONSES TO PHQ QUESTIONS 1-9: 3
5. POOR APPETITE OR OVEREATING: NOT AT ALL

## 2024-02-13 NOTE — PROGRESS NOTES
Assessment & Plan     Bryan was seen today for follow up.    Diagnoses and all orders for this visit:    Hypogonadism male  -     testosterone cypionate (DEPOTESTOSTERONE) 200 MG/ML injection; Inject 0.4 mLs (80 mg) into the muscle once a week  Reviewed labs ordered prior to visit. Midcycle T in middle of range with room to increase. Will increase from 60 to 80 mg a week. Recheck in 2 months. Will recheck CMP at that time.    PrEP Candidate  -     emtricitabine-tenofovir (TRUVADA) 200-300 MG per tablet; Take 1 tablet by mouth daily  Continues to be eligible to PrEP. Refilled. HIV prior to visit negative.     Seasonal affective disorder (H24)  Doing well from mood standpoint. PHQ below stable. Will monitor.     Morbid obesity (H)  Taking zepbound medication and tolerating. Will plan dose increase in 2 weeks, already ordered.    ADHD (attention deficit hyperactivity disorder), combined type  -     methylphenidate HCl ER, OSM, (CONCERTA) 36 MG CR tablet; Take 1 tablet (36 mg) by mouth every morning  Switching to concerta this week. Refill ordered and will follow up in 6 weeks.         Ordering of each unique test  Prescription drug management    Subjective   Bryan is a 43 year old, presenting for the following health issues:  Follow Up (Testosterone therapy follow up)        2/13/2024    11:08 AM   Additional Questions   Roomed by Lizzie CISNEROS   Hypogonadism  Shot Day Monday.   Midweek was 532.     Weight  Zepbound going OK  Some nausea around, but is managed          5/18/2023    11:40 PM 5/19/2023     8:19 AM 2/13/2024    11:34 AM   PHQ   PHQ-9 Total Score 4 3 3   Q9: Thoughts of better off dead/self-harm past 2 weeks Not at all Not at all Not at all         2/13/2024    11:34 AM   CHARLEE-7 SCORE   Total Score 1       HIV Prevention / PrEP - Follow up  Bryan is here for follow up concerning pre-exposure prophylaxis for HIV.    Ongoing risk factors for acquiring HIV infection:  Patient is member of high prevalence group  "(MSM, non-monogmous partnership): no/yes: YES  Any anal sex without condoms in the past 6 months: no/yes: YES  Has a current partner that is HIV positive: no/yes: no   ANY Bacterial STI in the last 6 months: no/yes: no       Review of Systems   DENIES: Denies lymphadenopathy,fevers,chills,rigors,night sweats,weight loss,oral thrush,mouth lesions,dyspnea,cough,diarrhea, edema.  DENIES: Denies  discharge, rash, genital lesions.            Objective    /85   Pulse 109   Temp 98.3  F (36.8  C) (Oral)   Ht 1.803 m (5' 11\")   Wt 121.7 kg (268 lb 4.8 oz)   SpO2 97%   BMI 37.42 kg/m    Body mass index is 37.42 kg/m .  Physical Exam   General: Alert and oriented, in no acute distress.  Skin: Warm and dry, no abnormalities noted.  Eyes: Extra-ocular muscles grossly intact, pupils equal.  ENT: Speech intact, nasal passages open, no hearing impairment noted.  CV: No cyanosis or pallor, warm and well perfused.  Respiratory: No respiratory distress, no accessory muscle use.  Neuro: Gait and station normal, comprehension intact. Gross and fine motor skills intact.   Psychiatric: Mood and affect appear normal.   Extremities: Warm, able to move all four extremities at will.      Results from last visit:  Lab on 02/08/2024   Component Date Value Ref Range Status    HIV Antigen Antibody Combo 02/08/2024 Nonreactive  Nonreactive Final    Negative HIV-1/-2 antigen and antibody screening test results usually indicate the absence of HIV-1 and HIV-2 infection. However, such negative results do not rule-out acute HIV infection.  If acute HIV-1 or HIV-2 infection is suspected, detection of HIV-1 or HIV-2 RNA  is recommended.     Sex Hormone Binding Globulin 02/08/2024 22  11 - 80 nmol/L Final    Free Testosterone Calculated 02/08/2024 13.90  ng/dL Final    Male Harshil Ranges:  Harshil Stage I: Less than or equal to 0.37 ng/dL  Harshil Stage II: 0.03-2.1 ng/dL  Harshil Stage III: 0.10-9.8 ng/dL  Harshil Stage IV: 3.5-16.9 " ng/dL  Harshil Stage V: 4.1-23.9 ng/dL    Testosterone Total 02/08/2024 532  240 - 950 ng/dL Final             Signed Electronically by: Jovani Montesinos DO

## 2024-03-19 ENCOUNTER — MYC MEDICAL ADVICE (OUTPATIENT)
Dept: FAMILY MEDICINE | Facility: CLINIC | Age: 44
End: 2024-03-19
Payer: COMMERCIAL

## 2024-03-19 DIAGNOSIS — E66.01 MORBID OBESITY (H): Chronic | ICD-10-CM

## 2024-03-26 ENCOUNTER — VIRTUAL VISIT (OUTPATIENT)
Dept: FAMILY MEDICINE | Facility: CLINIC | Age: 44
End: 2024-03-26
Payer: COMMERCIAL

## 2024-03-26 DIAGNOSIS — E66.01 MORBID OBESITY (H): Chronic | ICD-10-CM

## 2024-03-26 DIAGNOSIS — F90.2 ADHD (ATTENTION DEFICIT HYPERACTIVITY DISORDER), COMBINED TYPE: Primary | Chronic | ICD-10-CM

## 2024-03-26 PROCEDURE — 99214 OFFICE O/P EST MOD 30 MIN: CPT | Mod: 95 | Performed by: STUDENT IN AN ORGANIZED HEALTH CARE EDUCATION/TRAINING PROGRAM

## 2024-03-26 RX ORDER — METHYLPHENIDATE HYDROCHLORIDE 54 MG/1
54 TABLET ORAL DAILY
Qty: 30 TABLET | Refills: 0 | Status: SHIPPED | OUTPATIENT
Start: 2024-03-26 | End: 2024-04-25

## 2024-03-26 RX ORDER — METHYLPHENIDATE HYDROCHLORIDE 54 MG/1
54 TABLET ORAL DAILY
Qty: 30 TABLET | Refills: 0 | Status: SHIPPED | OUTPATIENT
Start: 2024-04-26 | End: 2024-05-26

## 2024-03-26 NOTE — PROGRESS NOTES
Bryan is a 44 year old who is being evaluated via a billable video visit.    How would you like to obtain your AVS? MyChart  If the video visit is dropped, the invitation should be resent by: Text to cell phone: [phone]  Will anyone else be joining your video visit? No      Assessment & Plan     Bryan was seen today for follow up.    Diagnoses and all orders for this visit:    ADHD (attention deficit hyperactivity disorder), combined type  -     methylphenidate HCl ER, OSM, (CONCERTA) 54 MG CR tablet; Take 1 tablet (54 mg) by mouth daily for 30 days  -     methylphenidate HCl ER, OSM, (CONCERTA) 54 MG CR tablet; Take 1 tablet (54 mg) by mouth daily for 30 days  Chronic condition not at goal, especially focus. Will increase from 36mg to 54 mg now. Follow up in 3 weeks.     Morbid obesity (H)  - Working on getting zepbound filled. Has last dose at current level today and it is going quite well. Less cravings. Better portion control.       Prescription drug management    Return in 3 weeks (on 4/16/2024).    Subjective   Bryan is a 44 year old, presenting for the following health issues:  Follow Up (Follow up)        3/26/2024     2:31 PM   Additional Questions   Roomed by Lizzie         3/26/2024    Information    services provided? No     HPI     ADHD  - On Concerta 36mg. Started on 8 weeks.   - Is still having leg shaking that is a sign of inattention.  - Focus is ok but not at goal.   - Tasks are happening but not as easy as he'd like.   - Sleep is ok, difficulty going to sleep some nights.          Objective           Vitals:  No vitals were obtained today due to virtual visit.    Physical Exam   GENERAL: alert and no distress  EYES: Eyes grossly normal to inspection.  No discharge or erythema, or obvious scleral/conjunctival abnormalities.  RESP: No audible wheeze, cough, or visible cyanosis.    SKIN: Visible skin clear. No significant rash, abnormal pigmentation or lesions.  NEURO: Cranial  nerves grossly intact.  Mentation and speech appropriate for age.  PSYCH: Appropriate affect, tone, and pace of words      Video-Visit Details    Type of service:  Video Visit   Originating Location (pt. Location): Home    Distant Location (provider location):  On-site  Platform used for Video Visit: Jori  Signed Electronically by: Jovani Montesinos DO

## 2024-03-26 NOTE — PATIENT INSTRUCTIONS
Changing from 36 mg to 54mg.   -- Will check in in 3 weeks  -- Do not expect bug change overnight.

## 2024-04-02 ENCOUNTER — TELEPHONE (OUTPATIENT)
Dept: FAMILY MEDICINE | Facility: CLINIC | Age: 44
End: 2024-04-02
Payer: COMMERCIAL

## 2024-04-02 DIAGNOSIS — R23.8 SKIN IRRITATION: Primary | ICD-10-CM

## 2024-04-02 RX ORDER — BETAMETHASONE DIPROPIONATE 0.5 MG/G
CREAM TOPICAL 2 TIMES DAILY
Qty: 45 G | Refills: 1 | Status: SHIPPED | OUTPATIENT
Start: 2024-04-02

## 2024-04-02 NOTE — TELEPHONE ENCOUNTER
Diagnoses and all orders for this visit:    Skin irritation  -     betamethasone dipropionate (DIPROSONE) 0.05 % external cream; Apply topically 2 times daily Maximum 2 weeks at a time per episode      Skin irritation at site on penis where glans meets frenulum. Looks like mild cracking. Will plan STI screening labs in 2 weeks and physical exam.     Jovani Montesinos, DO

## 2024-04-04 ENCOUNTER — MYC MEDICAL ADVICE (OUTPATIENT)
Dept: FAMILY MEDICINE | Facility: CLINIC | Age: 44
End: 2024-04-04
Payer: COMMERCIAL

## 2024-04-04 DIAGNOSIS — E66.01 MORBID OBESITY (H): Chronic | ICD-10-CM

## 2024-04-09 SDOH — HEALTH STABILITY: PHYSICAL HEALTH: ON AVERAGE, HOW MANY MINUTES DO YOU ENGAGE IN EXERCISE AT THIS LEVEL?: 30 MIN

## 2024-04-09 SDOH — HEALTH STABILITY: PHYSICAL HEALTH: ON AVERAGE, HOW MANY DAYS PER WEEK DO YOU ENGAGE IN MODERATE TO STRENUOUS EXERCISE (LIKE A BRISK WALK)?: 3 DAYS

## 2024-04-09 ASSESSMENT — SOCIAL DETERMINANTS OF HEALTH (SDOH): HOW OFTEN DO YOU GET TOGETHER WITH FRIENDS OR RELATIVES?: MORE THAN THREE TIMES A WEEK

## 2024-04-16 ENCOUNTER — OFFICE VISIT (OUTPATIENT)
Dept: FAMILY MEDICINE | Facility: CLINIC | Age: 44
End: 2024-04-16
Payer: COMMERCIAL

## 2024-04-16 VITALS
TEMPERATURE: 97.6 F | HEART RATE: 76 BPM | SYSTOLIC BLOOD PRESSURE: 124 MMHG | OXYGEN SATURATION: 97 % | BODY MASS INDEX: 36.24 KG/M2 | WEIGHT: 258.9 LBS | RESPIRATION RATE: 16 BRPM | HEIGHT: 71 IN | DIASTOLIC BLOOD PRESSURE: 84 MMHG

## 2024-04-16 DIAGNOSIS — N48.89 PENILE PAIN: ICD-10-CM

## 2024-04-16 DIAGNOSIS — Z11.4 SCREENING FOR HIV (HUMAN IMMUNODEFICIENCY VIRUS): ICD-10-CM

## 2024-04-16 DIAGNOSIS — Z11.3 SCREENING EXAMINATION FOR STI: ICD-10-CM

## 2024-04-16 DIAGNOSIS — F90.2 ADHD (ATTENTION DEFICIT HYPERACTIVITY DISORDER), COMBINED TYPE: Chronic | ICD-10-CM

## 2024-04-16 DIAGNOSIS — Z20.6 CONTACT WITH AND (SUSPECTED) EXPOSURE TO HUMAN IMMUNODEFICIENCY VIRUS (HIV): ICD-10-CM

## 2024-04-16 DIAGNOSIS — Z00.00 ANNUAL PHYSICAL EXAM: Primary | ICD-10-CM

## 2024-04-16 DIAGNOSIS — F33.8 SEASONAL AFFECTIVE DISORDER (H): Chronic | ICD-10-CM

## 2024-04-16 DIAGNOSIS — E29.1 HYPOGONADISM MALE: ICD-10-CM

## 2024-04-16 PROCEDURE — 99396 PREV VISIT EST AGE 40-64: CPT | Performed by: STUDENT IN AN ORGANIZED HEALTH CARE EDUCATION/TRAINING PROGRAM

## 2024-04-16 NOTE — PROGRESS NOTES
"Preventive Care Visit  [facility]  Apr 16, 2024        Bryan was seen today for physical.    Diagnoses and all orders for this visit:    Annual physical exam    Counseling  Appropriate preventive services were discussed with this patient, including applicable screening as appropriate for fall prevention, nutrition, physical activity, Tobacco-use cessation, weight loss and cognition.  Checklist reviewing preventive services available has been given to the patient.  Reviewed patient's diet, addressing concerns and/or questions.   He is at risk for lack of exercise and has been provided with information to increase physical activity for the benefit of his well-being.     Hypogonadism male  -     Testosterone Free and Total; Future  -     Comprehensive Metabolic Panel; Future  -testosterone levels and CMP at follow up visit in June. Ordered for mid cycle.     Screening examination for STI  -     Rapid Plasma Reagin w Rflx to TITER; Future  Ordered for future.    Screening for HIV (human immunodeficiency virus)  -     HIV Antigen Antibody Combo; Future  Ordered for future.    Penile pain  Skin tear  -start emollient such as aquaphor  -continue betamethasone ointment for skin thinning, up to 2 weeks total of use.      PrEP  -continue truvada 200-300 per tablet 1x/d    Seasonal Affective Disorder, controlled  -Continue zoloft    Severe Obesity  -advance tirzaepatide to 10 then 12.5 as pharmacy is able to fill it.    ADHD, combined type  - continue methylphenidate 36 (Concerta) since 2/13, stable and working dose.     Patient has been advised of split billing requirements and indicates understanding: Yes  Prescription drug management       BMI  Estimated body mass index is 36.11 kg/m  as calculated from the following:    Height as of this encounter: 1.803 m (5' 11\").    Weight as of this encounter: 117.4 kg (258 lb 14.4 oz).   Weight management plan: See GLP1 plan " above          Return in about 3 months (around 7/16/2024).      Subjective   Bryan is a 44 year old, presenting for the following:  Physical        4/16/2024     9:38 AM   Additional Questions   Roomed by Lynnette   Accompanied by self        Health Care Directive  Patient does not have a Health Care Directive or Living Will: Discussed advance care planning with patient; information given to patient to review.    HPI    Penile pain  Skin tear  2 weeks of stable pain associated with no pruritus or skin component, worse with erection  Betamethasone dipropionate cream provides temporary relief.   On exam appears to be small skin tear along frenulum, with no signs of cellulitis, inflammation, or other infection.  No dysuria or hematuria, no systemic or constitutional symptoms.     Male hypogonadism  -depotestosterone 200 1x/w  -desires refill    PrEP  -truvada 200-300 per tablet 1x/d    Seasonal Affective Disorder  -mood is satisfactory this year for the first time, managed with zoloft    Severe Obesity  -tirzepatide 7.5, will advance to 10 or 12.5 as pharmacy is able to fill it.    ADHD, combined type  -methylphenidate 36 (Concerta) since 2/13. Stable and doing well.  -switched from adderall, prefers concerta.         4/9/2024   General Health   How would you rate your overall physical health? Good   Feel stress (tense, anxious, or unable to sleep) To some extent   (!) STRESS CONCERN      4/9/2024   Nutrition   Three or more servings of calcium each day? Yes   Diet: Regular (no restrictions)   How many servings of fruit and vegetables per day? (!) 2-3   How many sweetened beverages each day? 0-1         4/9/2024   Exercise   Days per week of moderate/strenous exercise 3 days   Average minutes spent exercising at this level 30 min         4/9/2024   Social Factors   Frequency of gathering with friends or relatives More than three times a week   Worry food won't last until get money to buy more No   Food not last or not  have enough money for food? No   Do you have housing?  Yes   Are you worried about losing your housing? No   Lack of transportation? No   Unable to get utilities (heat,electricity)? No         2024   Dental   Dentist two times every year? Yes         2024   TB Screening   Were you born outside of the [place]? No           Today's PHQ-2 Score:       1/3/2024     1:32 PM   PHQ-2 (  [employer])   Q1: Little interest or pleasure in doing things 0   Q2: Feeling down, depressed or hopeless 0   PHQ-2 Score 0   Q1: Little interest or pleasure in doing things Not at all   Q2: Feeling down, depressed or hopeless Not at all   PHQ-2 Score 0         2024   Substance Use   Alcohol more than 3/day or more than 7/wk No   Do you use any other substances recreationally? No     Social History     Tobacco Use    Smoking status: Former     Current packs/day: 0.00     Types: Cigarettes     Quit date: 2018     Years since quittin.3    Smokeless tobacco: Never   Vaping Use    Vaping status: Never Used   Substance Use Topics    Alcohol use: Yes     Comment: 1-2 drinks a month    Drug use: Not Currently           2024   STI Screening   New sexual partner(s) since last STI/HIV test? No   ASCVD Risk   The 10-year ASCVD risk score (Avery WARREN, et al., 2019) is: 2.6%    Values used to calculate the score:      Age: 44 years      Sex: Male      Is Non- : No      Diabetic: No      Tobacco smoker: No      Systolic Blood Pressure: 124 mmHg      Is BP treated: Yes      HDL Cholesterol: 26 mg/dL      Total Cholesterol: 143 mg/dL        2024   Contraception/Family Planning   Questions about contraception or family planning No        Reviewed and updated as needed this visit by Provider   Tobacco  Allergies  Meds  Problems  Med Hx  Surg Hx  Fam Hx                Review of Systems   ROS: 10 point ROS neg other than the symptoms noted above in the HPI and:    Gi: diarrhea with start  "oftirzepatide, resolved with avoidance of fiber rich foods.       Objective    Exam  /84   Pulse 76   Temp 97.6  F (36.4  C) (Oral)   Resp 16   Ht 1.803 m (5' 11\")   Wt 117.4 kg (258 lb 14.4 oz)   SpO2 97%   BMI 36.11 kg/m     Estimated body mass index is 36.11 kg/m  as calculated from the following:    Height as of this encounter: 1.803 m (5' 11\").    Weight as of this encounter: 117.4 kg (258 lb 14.4 oz).    Physical Exam  GENERAL: alert and no distress  RESP: lungs clear to auscultation - no rales, rhonchi or wheezes  CV: regular rate and rhythm, normal S1 S2, no S3 or S4, no murmur, click or rub, no peripheral edema  ABDOMEN: soft, nontender to light and deep palpation, no hepatosplenomegaly, no masses and bowel sounds normal  MS: no gross musculoskeletal defects noted, no edema  SKIN: small <1cm tear along penile frenulum with some skin thickening        Signed Electronically by: Jovani Monetsinos, DO    "

## 2024-04-18 PROBLEM — K21.9 GASTROESOPHAGEAL REFLUX DISEASE WITHOUT ESOPHAGITIS: Status: ACTIVE | Noted: 2024-04-18

## 2024-04-19 ENCOUNTER — DOCUMENTATION ONLY (OUTPATIENT)
Dept: FAMILY MEDICINE | Facility: CLINIC | Age: 44
End: 2024-04-19

## 2024-04-19 NOTE — PROGRESS NOTES
"Form has been completed by provider.     Form sent out via: Per patient request, emailed to patient   Patient informed: Yes  Output date: April 19, 2024    Laisha Archer RN      **Please close the encounter**     When opening a documentation only encounter, be sure to enter in \"Chief Complaint\" Forms and in \" Comments\" Title of form, description if needed.    Bryan is a 44 year old  male  Form received via: My Chart  Form now resides in: Provider Ready    Laisha Archer RN               "

## 2024-05-04 ENCOUNTER — MYC MEDICAL ADVICE (OUTPATIENT)
Dept: FAMILY MEDICINE | Facility: CLINIC | Age: 44
End: 2024-05-04
Payer: COMMERCIAL

## 2024-05-04 DIAGNOSIS — E66.01 MORBID OBESITY (H): Chronic | ICD-10-CM

## 2024-05-16 ENCOUNTER — LAB (OUTPATIENT)
Dept: LAB | Facility: CLINIC | Age: 44
End: 2024-05-16
Payer: COMMERCIAL

## 2024-05-16 DIAGNOSIS — Z11.3 SCREENING EXAMINATION FOR STI: ICD-10-CM

## 2024-05-16 DIAGNOSIS — Z11.4 SCREENING FOR HIV (HUMAN IMMUNODEFICIENCY VIRUS): ICD-10-CM

## 2024-05-16 DIAGNOSIS — E29.1 HYPOGONADISM MALE: ICD-10-CM

## 2024-05-16 PROCEDURE — 84403 ASSAY OF TOTAL TESTOSTERONE: CPT

## 2024-05-16 PROCEDURE — 87389 HIV-1 AG W/HIV-1&-2 AB AG IA: CPT

## 2024-05-16 PROCEDURE — 86592 SYPHILIS TEST NON-TREP QUAL: CPT

## 2024-05-16 PROCEDURE — 36415 COLL VENOUS BLD VENIPUNCTURE: CPT

## 2024-05-16 PROCEDURE — 84270 ASSAY OF SEX HORMONE GLOBUL: CPT

## 2024-05-16 PROCEDURE — 86593 SYPHILIS TEST NON-TREP QUANT: CPT

## 2024-05-16 PROCEDURE — 80053 COMPREHEN METABOLIC PANEL: CPT

## 2024-05-17 LAB
ALBUMIN SERPL BCG-MCNC: 4.4 G/DL (ref 3.5–5.2)
ALP SERPL-CCNC: 50 U/L (ref 40–150)
ALT SERPL W P-5'-P-CCNC: 33 U/L (ref 0–70)
ANION GAP SERPL CALCULATED.3IONS-SCNC: 10 MMOL/L (ref 7–15)
AST SERPL W P-5'-P-CCNC: 39 U/L (ref 0–45)
BILIRUB SERPL-MCNC: 0.2 MG/DL
BUN SERPL-MCNC: 14.8 MG/DL (ref 6–20)
CALCIUM SERPL-MCNC: 8.8 MG/DL (ref 8.6–10)
CHLORIDE SERPL-SCNC: 106 MMOL/L (ref 98–107)
CREAT SERPL-MCNC: 0.95 MG/DL (ref 0.67–1.17)
DEPRECATED HCO3 PLAS-SCNC: 23 MMOL/L (ref 22–29)
EGFRCR SERPLBLD CKD-EPI 2021: >90 ML/MIN/1.73M2
GLUCOSE SERPL-MCNC: 82 MG/DL (ref 70–99)
HIV 1+2 AB+HIV1 P24 AG SERPL QL IA: NONREACTIVE
POTASSIUM SERPL-SCNC: 4 MMOL/L (ref 3.4–5.3)
PROT SERPL-MCNC: 7.2 G/DL (ref 6.4–8.3)
RPR SER QL: REACTIVE
RPR SER-TITR: ABNORMAL {TITER}
SHBG SERPL-SCNC: 27 NMOL/L (ref 11–80)
SODIUM SERPL-SCNC: 139 MMOL/L (ref 135–145)

## 2024-05-18 LAB
TESTOST FREE SERPL-MCNC: 21.28 NG/DL
TESTOST SERPL-MCNC: 829 NG/DL (ref 240–950)

## 2024-05-20 ENCOUNTER — MYC REFILL (OUTPATIENT)
Dept: FAMILY MEDICINE | Facility: CLINIC | Age: 44
End: 2024-05-20
Payer: COMMERCIAL

## 2024-05-20 DIAGNOSIS — E29.1 HYPOGONADISM MALE: ICD-10-CM

## 2024-05-21 RX ORDER — TESTOSTERONE CYPIONATE 200 MG/ML
200 INJECTION, SOLUTION INTRAMUSCULAR WEEKLY
Qty: 12 ML | Refills: 3 | Status: SHIPPED | OUTPATIENT
Start: 2024-05-21

## 2024-05-21 NOTE — TELEPHONE ENCOUNTER
"Request for medication refill: testosterone cypionate (DEPOTESTOSTERONE) 200 MG/ML injection     Providers if patient needs an appointment and you are willing to give a one month supply please refill for one month and  send a letter/MyChart using \".SMILLIMITEDREFILL\" .smillimited and route chart to \"P [facility] \" (Giving one month refill in non controlled medications is strongly recommended before denial)    If refill has been denied, meaning absolutely no refills without visit, please complete the smart phrase \".smirxrefuse\" and route it to the \"P [facility] MED REFILLS\"  pool to inform the patient and the pharmacy.    Christine León, CMA    "

## 2024-05-22 ENCOUNTER — MYC REFILL (OUTPATIENT)
Dept: FAMILY MEDICINE | Facility: CLINIC | Age: 44
End: 2024-05-22
Payer: COMMERCIAL

## 2024-05-22 DIAGNOSIS — Z20.6 CONTACT WITH AND (SUSPECTED) EXPOSURE TO HUMAN IMMUNODEFICIENCY VIRUS (HIV): ICD-10-CM

## 2024-05-22 DIAGNOSIS — L65.9 HAIR LOSS: ICD-10-CM

## 2024-05-22 DIAGNOSIS — N52.9 ERECTILE DYSFUNCTION, UNSPECIFIED ERECTILE DYSFUNCTION TYPE: ICD-10-CM

## 2024-05-22 DIAGNOSIS — F90.2 ADHD (ATTENTION DEFICIT HYPERACTIVITY DISORDER), COMBINED TYPE: Chronic | ICD-10-CM

## 2024-05-22 RX ORDER — EMTRICITABINE AND TENOFOVIR DISOPROXIL FUMARATE 200; 300 MG/1; MG/1
1 TABLET, FILM COATED ORAL DAILY
Qty: 90 TABLET | Refills: 0 | Status: SHIPPED | OUTPATIENT
Start: 2024-05-22 | End: 2024-08-15

## 2024-05-22 RX ORDER — METHYLPHENIDATE HYDROCHLORIDE 54 MG/1
54 TABLET ORAL DAILY
Qty: 30 TABLET | Refills: 0 | Status: CANCELLED | OUTPATIENT
Start: 2024-05-22

## 2024-05-22 NOTE — TELEPHONE ENCOUNTER
"Request for medication refill:methylphenidate HCl ER, OSM, (CONCERTA) 54 MG CR tablet     Providers if patient needs an appointment and you are willing to give a one month supply please refill for one month and  send a letter/MyChart using \".SMILLIMITEDREFILL\" .smillimited and route chart to \"P [facility] \" (Giving one month refill in non controlled medications is strongly recommended before denial)    If refill has been denied, meaning absolutely no refills without visit, please complete the smart phrase \".smirxrefuse\" and route it to the \"P [facility] MED REFILLS\"  pool to inform the patient and the pharmacy.    Carol Ricardo MA      "

## 2024-05-22 NOTE — TELEPHONE ENCOUNTER
"Request for medication refill:    emtricitabine-tenofovir (TRUVADA) 200-300 MG per tablet       Providers if patient needs an appointment and you are willing to give a one month supply please refill for one month and  send a letter/MyChart using \".SMILLIMITEDREFILL\" .smillimited and route chart to \"P [facility] \" (Giving one month refill in non controlled medications is strongly recommended before denial)    If refill has been denied, meaning absolutely no refills without visit, please complete the smart phrase \".smirxrefuse\" and route it to the \"P [facility] MED REFILLS\"  pool to inform the patient and the pharmacy.    Carol Ricardo MA      "

## 2024-05-23 ENCOUNTER — MYC REFILL (OUTPATIENT)
Dept: FAMILY MEDICINE | Facility: CLINIC | Age: 44
End: 2024-05-23
Payer: COMMERCIAL

## 2024-05-23 DIAGNOSIS — R11.0 NAUSEA: ICD-10-CM

## 2024-05-23 RX ORDER — METHYLPHENIDATE HYDROCHLORIDE 54 MG/1
54 TABLET ORAL DAILY
Qty: 30 TABLET | Refills: 0 | Status: SHIPPED | OUTPATIENT
Start: 2024-05-23 | End: 2024-06-22

## 2024-05-23 RX ORDER — METHYLPHENIDATE HYDROCHLORIDE 54 MG/1
54 TABLET ORAL DAILY
Qty: 30 TABLET | Refills: 0 | Status: SHIPPED | OUTPATIENT
Start: 2024-07-22 | End: 2024-08-15

## 2024-05-23 RX ORDER — METHYLPHENIDATE HYDROCHLORIDE 54 MG/1
54 TABLET ORAL DAILY
Qty: 30 TABLET | Refills: 0 | Status: SHIPPED | OUTPATIENT
Start: 2024-06-22 | End: 2024-07-22

## 2024-05-24 RX ORDER — ONDANSETRON 4 MG/1
4 TABLET, ORALLY DISINTEGRATING ORAL EVERY 8 HOURS PRN
Qty: 27 TABLET | Refills: 5 | Status: SHIPPED | OUTPATIENT
Start: 2024-05-24 | End: 2024-07-27

## 2024-05-24 RX ORDER — TADALAFIL 5 MG/1
5 TABLET ORAL EVERY 24 HOURS
Qty: 90 TABLET | Refills: 3 | Status: SHIPPED | OUTPATIENT
Start: 2024-05-24

## 2024-05-24 RX ORDER — FINASTERIDE 1 MG/1
1 TABLET, FILM COATED ORAL DAILY
Qty: 90 TABLET | Refills: 3 | Status: SHIPPED | OUTPATIENT
Start: 2024-05-24

## 2024-05-24 NOTE — TELEPHONE ENCOUNTER
"Request for medication refill:  ondansetron (ZOFRAN ODT) 4 MG ODT tab    Providers if patient needs an appointment and you are willing to give a one month supply please refill for one month and  send a letter/MyChart using \".SMILLIMITEDREFILL\" .smillimited and route chart to \"P [facility] \" (Giving one month refill in non controlled medications is strongly recommended before denial)    If refill has been denied, meaning absolutely no refills without visit, please complete the smart phrase \".smirxrefuse\" and route it to the \"P [facility] MED REFILLS\"  pool to inform the patient and the pharmacy.    Christine León, CMA    "

## 2024-05-24 NOTE — TELEPHONE ENCOUNTER
"Request for medication refill: tadalafil (CIALIS) 5 MG tablet       Providers if patient needs an appointment and you are willing to give a one month supply please refill for one month and  send a letter/MyChart using \".SMILLIMITEDREFILL\" .smillimited and route chart to \"P [facility] \" (Giving one month refill in non controlled medications is strongly recommended before denial)    If refill has been denied, meaning absolutely no refills without visit, please complete the smart phrase \".smirxrefuse\" and route it to the \"P [facility] MED REFILLS\"  pool to inform the patient and the pharmacy.    Christine León, CMA    "

## 2024-05-24 NOTE — TELEPHONE ENCOUNTER
"Request for medication refill: finasteride (PROPECIA) 1 MG tablet     Providers if patient needs an appointment and you are willing to give a one month supply please refill for one month and  send a letter/MyChart using \".SMILLIMITEDREFILL\" .smillimited and route chart to \"P [facility] \" (Giving one month refill in non controlled medications is strongly recommended before denial)    If refill has been denied, meaning absolutely no refills without visit, please complete the smart phrase \".smirxrefuse\" and route it to the \"P [facility] MED REFILLS\"  pool to inform the patient and the pharmacy.    Christine León, CMA    "

## 2024-05-27 DIAGNOSIS — G47.00 INSOMNIA, UNSPECIFIED TYPE: ICD-10-CM

## 2024-05-28 RX ORDER — TRAZODONE HYDROCHLORIDE 50 MG/1
50-100 TABLET, FILM COATED ORAL AT BEDTIME
Qty: 180 TABLET | Refills: 3 | Status: SHIPPED | OUTPATIENT
Start: 2024-05-28 | End: 2024-08-15

## 2024-05-28 NOTE — TELEPHONE ENCOUNTER
"Request for medication refill: traZODone (DESYREL) 50 MG tablet     Providers if patient needs an appointment and you are willing to give a one month supply please refill for one month and  send a letter/MyChart using \".SMILLIMITEDREFILL\" .smillimited and route chart to \"P [facility] \" (Giving one month refill in non controlled medications is strongly recommended before denial)    If refill has been denied, meaning absolutely no refills without visit, please complete the smart phrase \".smirxrefuse\" and route it to the \"P [facility] MED REFILLS\"  pool to inform the patient and the pharmacy.    Christine León, CMA    "

## 2024-06-03 ENCOUNTER — MYC REFILL (OUTPATIENT)
Dept: FAMILY MEDICINE | Facility: CLINIC | Age: 44
End: 2024-06-03
Payer: COMMERCIAL

## 2024-06-03 DIAGNOSIS — E66.01 MORBID OBESITY (H): Chronic | ICD-10-CM

## 2024-06-03 NOTE — TELEPHONE ENCOUNTER
"Request for medication refill:  tirzepatide-Weight Management (ZEPBOUND) 12.5 MG/0.5ML prefilled pen     Providers if patient needs an appointment and you are willing to give a one month supply please refill for one month and  send a letter/MyChart using \".SMILLIMITEDREFILL\" .smillimited and route chart to \"P [facility] \" (Giving one month refill in non controlled medications is strongly recommended before denial)    If refill has been denied, meaning absolutely no refills without visit, please complete the smart phrase \".smirxrefuse\" and route it to the \"P [facility] MED REFILLS\"  pool to inform the patient and the pharmacy.    Christine León, CMA    "

## 2024-06-05 ENCOUNTER — MYC REFILL (OUTPATIENT)
Dept: FAMILY MEDICINE | Facility: CLINIC | Age: 44
End: 2024-06-05
Payer: COMMERCIAL

## 2024-06-05 DIAGNOSIS — E66.01 MORBID OBESITY (H): Chronic | ICD-10-CM

## 2024-06-06 ENCOUNTER — MYC MEDICAL ADVICE (OUTPATIENT)
Dept: FAMILY MEDICINE | Facility: CLINIC | Age: 44
End: 2024-06-06
Payer: COMMERCIAL

## 2024-06-10 NOTE — TELEPHONE ENCOUNTER
tirzepatide-Weight Management (ZEPBOUND) 12.5 MG/0.5ML prefilled pen     Was sent on 6/3/24, My Chart message sent to patient.    Laisha Archer RN

## 2024-06-17 ENCOUNTER — TELEPHONE (OUTPATIENT)
Dept: FAMILY MEDICINE | Facility: CLINIC | Age: 44
End: 2024-06-17

## 2024-06-17 ENCOUNTER — OFFICE VISIT (OUTPATIENT)
Dept: FAMILY MEDICINE | Facility: CLINIC | Age: 44
End: 2024-06-17
Payer: COMMERCIAL

## 2024-06-17 VITALS
HEIGHT: 71 IN | RESPIRATION RATE: 16 BRPM | BODY MASS INDEX: 34.03 KG/M2 | DIASTOLIC BLOOD PRESSURE: 68 MMHG | SYSTOLIC BLOOD PRESSURE: 100 MMHG | OXYGEN SATURATION: 96 % | WEIGHT: 243.1 LBS | HEART RATE: 78 BPM | TEMPERATURE: 98.3 F

## 2024-06-17 DIAGNOSIS — Z11.3 SCREENING EXAMINATION FOR VENEREAL DISEASE: ICD-10-CM

## 2024-06-17 DIAGNOSIS — E66.01 MORBID OBESITY (H): Primary | Chronic | ICD-10-CM

## 2024-06-17 DIAGNOSIS — A60.00 RECURRENT GENITAL HERPES: ICD-10-CM

## 2024-06-17 LAB
C TRACH DNA SPEC QL PROBE+SIG AMP: NEGATIVE
N GONORRHOEA DNA SPEC QL NAA+PROBE: NEGATIVE

## 2024-06-17 PROCEDURE — 99214 OFFICE O/P EST MOD 30 MIN: CPT | Performed by: STUDENT IN AN ORGANIZED HEALTH CARE EDUCATION/TRAINING PROGRAM

## 2024-06-17 PROCEDURE — 87591 N.GONORRHOEAE DNA AMP PROB: CPT | Performed by: STUDENT IN AN ORGANIZED HEALTH CARE EDUCATION/TRAINING PROGRAM

## 2024-06-17 PROCEDURE — 87491 CHLMYD TRACH DNA AMP PROBE: CPT | Performed by: STUDENT IN AN ORGANIZED HEALTH CARE EDUCATION/TRAINING PROGRAM

## 2024-06-17 PROCEDURE — G2211 COMPLEX E/M VISIT ADD ON: HCPCS | Performed by: STUDENT IN AN ORGANIZED HEALTH CARE EDUCATION/TRAINING PROGRAM

## 2024-06-17 RX ORDER — TIRZEPATIDE 12.5 MG/.5ML
12.5 INJECTION, SOLUTION SUBCUTANEOUS
Qty: 6 ML | Refills: 0 | Status: SHIPPED | OUTPATIENT
Start: 2024-06-17 | End: 2024-08-15

## 2024-06-17 RX ORDER — VALACYCLOVIR HYDROCHLORIDE 500 MG/1
500 TABLET, FILM COATED ORAL 2 TIMES DAILY
Qty: 6 TABLET | Refills: 3 | Status: SHIPPED | OUTPATIENT
Start: 2024-06-17 | End: 2024-06-20

## 2024-06-17 NOTE — PROGRESS NOTES
Assessment & Plan     Bryan was seen today for follow up and wound check.    Diagnoses and all orders for this visit:    Morbid obesity (H)  -     tirzepatide-Weight Management (ZEPBOUND) 12.5 MG/0.5ML prefilled pen; Inject 0.5 mLs (12.5 mg) Subcutaneous every 7 days  Chronic condition improving. Will keep dose increasing per protocol.     Screening examination for venereal disease  -     First voided urine-Chlamydia trachomatis/Neisseria gonorrhoeae by PCR; Future  -     Throat-Chlamydia trachomatis/Neisseria gonorrhoeae by PCR  -     Rectum-Chlamydia trachomatis/Neisseria gonorrhoeae by PCR  -     First voided urine-Chlamydia trachomatis/Neisseria gonorrhoeae by PCR    3 site screen neg.  Recurrent genital herpes  -     valACYclovir (VALTREX) 500 MG tablet; Take 1 tablet (500 mg) by mouth 2 times daily for 3 days  Known HSV on anal canal, not in flare now. Penile erythemia ddx skin tear, abrasion, HSV. Does not look like HPV associated lesion at this time. Has tried topical steroid without improvement but no worsening either, which makes fungal unlikely. Recent RPR testing shows stable titer, not indicative of new infection. Will watch conservatively at this time. Consider referral to urology if no improvement in 8-12 weeks. Will give HSV ppx to have on hand if flare. Cannot rule out that this skin lesion is HSV related.    The longitudinal plan of care for the diagnosis(es)/condition(s) as documented were addressed during this visit. Due to the added complexity in care, I will continue to support Bryan in the subsequent management and with ongoing continuity of care.        Return in about 3 months (around 9/17/2024).    Subjective   Bryan is a 44 year old, presenting for the following health issues:  Follow Up (STD screening ) and Wound Check (Laceration on penis )      6/17/2024    10:09 AM   Additional Questions   Roomed by Greg         6/17/2024    Information    services provided? No  "    HPI   Spot on penis with some tingling. Has had HSV on lips before, and anally, never penile.       Wt loss meds doing well. Is keeping up with increased physical activity and is dieting.           Objective    /68   Pulse 78   Temp 98.3  F (36.8  C) (Temporal)   Resp 16   Ht 1.803 m (5' 11\")   Wt 110.3 kg (243 lb 1.6 oz)   SpO2 96%   BMI 33.91 kg/m    Body mass index is 33.91 kg/m .  Physical Exam  Genitourinary:     Penis: Erythema (Mild- healing skin at glans-frenulum junction.) present. No discharge.         General: Alert and oriented, in no acute distress.  Skin: Warm and dry, no abnormalities noted.  Eyes: Extra-ocular muscles grossly intact, pupils equal.  ENT: Speech intact, nasal passages open, no hearing impairment noted.  CV: No cyanosis or pallor, warm and well perfused.  Respiratory: No respiratory distress, no accessory muscle use.  Neuro: Gait and station normal, comprehension intact. Gross and fine motor skills intact.   Psychiatric: Mood and affect appear normal.   Extremities: Warm, able to move all four extremities at will.      Results for orders placed or performed in visit on 06/17/24   Throat-Chlamydia trachomatis/Neisseria gonorrhoeae by PCR     Status: Normal    Specimen: Throat; Swab   Result Value Ref Range    Chlamydia Trachomatis Negative Negative    Neisseria gonorrhoeae Negative Negative   Rectum-Chlamydia trachomatis/Neisseria gonorrhoeae by PCR     Status: Normal    Specimen: Rectum; Swab   Result Value Ref Range    Chlamydia Trachomatis Negative Negative    Neisseria gonorrhoeae Negative Negative   First voided urine-Chlamydia trachomatis/Neisseria gonorrhoeae by PCR     Status: Normal    Specimen: Urine, Voided   Result Value Ref Range    Chlamydia Trachomatis Negative Negative    Neisseria gonorrhoeae Negative Negative           Signed Electronically by: Jovani Montesinos, DO    "

## 2024-06-17 NOTE — TELEPHONE ENCOUNTER
Retail Pharmacy Prior Authorization Team   Phone: [phone]    Note: Due to record-high volumes, our turn-around time is taking longer than usual . We are currently 2 weeks behind in the pools.   We are working diligently to submit all requests in a timely manner and in the order they are received. Please only flag TRUE URGENT requests as high priority to the pool at this time.   If you have questions on status of PA's,  please send a note/message in the active PA encounter and send back to the [facility] PA pool [[id number]].    If you have questions about the turn-around time or about our process, please reach out to our supervisor Aleta Oneal.   Thank you!   RPPA (Retail Pharmacy Prior Authorization) team

## 2024-06-20 ENCOUNTER — MYC REFILL (OUTPATIENT)
Dept: FAMILY MEDICINE | Facility: CLINIC | Age: 44
End: 2024-06-20
Payer: COMMERCIAL

## 2024-06-20 DIAGNOSIS — S39.012A STRAIN OF LUMBAR REGION, INITIAL ENCOUNTER: ICD-10-CM

## 2024-06-21 DIAGNOSIS — K21.9 GASTROESOPHAGEAL REFLUX DISEASE, UNSPECIFIED WHETHER ESOPHAGITIS PRESENT: ICD-10-CM

## 2024-06-21 RX ORDER — FAMOTIDINE 40 MG/1
TABLET, FILM COATED ORAL
Qty: 180 TABLET | Refills: 3 | Status: SHIPPED | OUTPATIENT
Start: 2024-06-21

## 2024-06-21 NOTE — TELEPHONE ENCOUNTER
"Request for medication refill:    famotidine (PEPCID) 40 MG tablet    Providers if patient needs an appointment and you are willing to give a one month supply please refill for one month and  send a letter/MyChart using \".SMILLIMITEDREFILL\" .smillimited and route chart to \"P [facility] \" (Giving one month refill in non controlled medications is strongly recommended before denial)    If refill has been denied, meaning absolutely no refills without visit, please complete the smart phrase \".smirxrefuse\" and route it to the \"P [facility] MED REFILLS\"  pool to inform the patient and the pharmacy.    Sarah Arreaga MA      "

## 2024-06-24 NOTE — TELEPHONE ENCOUNTER
Prior Authorization Not Needed per Insurance    Medication: ZEPBOUND 12.5 MG/0.5ML SC SOAJ  Insurance Company: [employer] Non-Specialty PA's - Phone [phone] Fax [phone]  Expected CoPay: $    Pharmacy Filling the Rx: [facility] - [place], [place] - [address] [place] [address]  Pharmacy Notified: No  Patient Notified: No    Was refill too soon

## 2024-06-25 RX ORDER — CYCLOBENZAPRINE HCL 5 MG
5-10 TABLET ORAL 3 TIMES DAILY PRN
Qty: 90 TABLET | Refills: 3 | Status: SHIPPED | OUTPATIENT
Start: 2024-06-25

## 2024-06-25 NOTE — TELEPHONE ENCOUNTER
"Request for medication refill: cyclobenzaprine (FLEXERIL) 5 MG tablet     Providers if patient needs an appointment and you are willing to give a one month supply please refill for one month and  send a letter/MyChart using \".SMILLIMITEDREFILL\" .smillimited and route chart to \"P [facility] \" (Giving one month refill in non controlled medications is strongly recommended before denial)    If refill has been denied, meaning absolutely no refills without visit, please complete the smart phrase \".smirxrefuse\" and route it to the \"P [facility] MED REFILLS\"  pool to inform the patient and the pharmacy.    Christine León, CMA    "

## 2024-07-27 ENCOUNTER — MYC REFILL (OUTPATIENT)
Dept: FAMILY MEDICINE | Facility: CLINIC | Age: 44
End: 2024-07-27
Payer: COMMERCIAL

## 2024-07-27 DIAGNOSIS — R11.0 NAUSEA: ICD-10-CM

## 2024-07-29 RX ORDER — ONDANSETRON 4 MG/1
4 TABLET, ORALLY DISINTEGRATING ORAL EVERY 8 HOURS PRN
Qty: 27 TABLET | Refills: 0 | Status: SHIPPED | OUTPATIENT
Start: 2024-07-29 | End: 2024-08-15

## 2024-07-29 NOTE — TELEPHONE ENCOUNTER
"Request for medication refill:  ondansetron (ZOFRAN ODT) 4 MG ODT tab     Providers if patient needs an appointment and you are willing to give a one month supply please refill for one month and  send a letter/MyChart using \".SMILLIMITEDREFILL\" .smillimited and route chart to \"P [facility] \" (Giving one month refill in non controlled medications is strongly recommended before denial)    If refill has been denied, meaning absolutely no refills without visit, please complete the smart phrase \".smirxrefuse\" and route it to the \"P [facility] MED REFILLS\"  pool to inform the patient and the pharmacy.    Roseline Mosher, CMA      "

## 2024-08-15 ENCOUNTER — OFFICE VISIT (OUTPATIENT)
Dept: FAMILY MEDICINE | Facility: CLINIC | Age: 44
End: 2024-08-15
Payer: COMMERCIAL

## 2024-08-15 VITALS
BODY MASS INDEX: 33.1 KG/M2 | SYSTOLIC BLOOD PRESSURE: 96 MMHG | HEART RATE: 112 BPM | TEMPERATURE: 98.3 F | DIASTOLIC BLOOD PRESSURE: 67 MMHG | OXYGEN SATURATION: 98 % | HEIGHT: 71 IN | WEIGHT: 236.4 LBS | RESPIRATION RATE: 19 BRPM

## 2024-08-15 DIAGNOSIS — G47.00 INSOMNIA, UNSPECIFIED TYPE: ICD-10-CM

## 2024-08-15 DIAGNOSIS — Z20.6 CONTACT WITH AND (SUSPECTED) EXPOSURE TO HUMAN IMMUNODEFICIENCY VIRUS (HIV): ICD-10-CM

## 2024-08-15 DIAGNOSIS — Z11.3 SCREENING EXAMINATION FOR VENEREAL DISEASE: Primary | ICD-10-CM

## 2024-08-15 DIAGNOSIS — R11.0 NAUSEA: ICD-10-CM

## 2024-08-15 DIAGNOSIS — I10 ESSENTIAL HYPERTENSION: ICD-10-CM

## 2024-08-15 DIAGNOSIS — F33.8 SEASONAL AFFECTIVE DISORDER (H): ICD-10-CM

## 2024-08-15 LAB — HIV 1+2 AB+HIV1 P24 AG SERPL QL IA: NONREACTIVE

## 2024-08-15 PROCEDURE — 87389 HIV-1 AG W/HIV-1&-2 AB AG IA: CPT | Performed by: STUDENT IN AN ORGANIZED HEALTH CARE EDUCATION/TRAINING PROGRAM

## 2024-08-15 PROCEDURE — 99214 OFFICE O/P EST MOD 30 MIN: CPT | Performed by: STUDENT IN AN ORGANIZED HEALTH CARE EDUCATION/TRAINING PROGRAM

## 2024-08-15 PROCEDURE — 87491 CHLMYD TRACH DNA AMP PROBE: CPT | Performed by: STUDENT IN AN ORGANIZED HEALTH CARE EDUCATION/TRAINING PROGRAM

## 2024-08-15 PROCEDURE — 86593 SYPHILIS TEST NON-TREP QUANT: CPT | Performed by: STUDENT IN AN ORGANIZED HEALTH CARE EDUCATION/TRAINING PROGRAM

## 2024-08-15 PROCEDURE — G2211 COMPLEX E/M VISIT ADD ON: HCPCS | Performed by: STUDENT IN AN ORGANIZED HEALTH CARE EDUCATION/TRAINING PROGRAM

## 2024-08-15 PROCEDURE — 36415 COLL VENOUS BLD VENIPUNCTURE: CPT | Performed by: STUDENT IN AN ORGANIZED HEALTH CARE EDUCATION/TRAINING PROGRAM

## 2024-08-15 PROCEDURE — 86592 SYPHILIS TEST NON-TREP QUAL: CPT | Performed by: STUDENT IN AN ORGANIZED HEALTH CARE EDUCATION/TRAINING PROGRAM

## 2024-08-15 PROCEDURE — 87591 N.GONORRHOEAE DNA AMP PROB: CPT | Performed by: STUDENT IN AN ORGANIZED HEALTH CARE EDUCATION/TRAINING PROGRAM

## 2024-08-15 RX ORDER — ONDANSETRON 4 MG/1
4 TABLET, ORALLY DISINTEGRATING ORAL EVERY 8 HOURS PRN
Qty: 27 TABLET | Refills: 5 | Status: SHIPPED | OUTPATIENT
Start: 2024-08-15

## 2024-08-15 RX ORDER — LISINOPRIL 5 MG/1
5 TABLET ORAL DAILY
Qty: 90 TABLET | Refills: 3 | Status: SHIPPED | OUTPATIENT
Start: 2024-08-15

## 2024-08-15 RX ORDER — TRAZODONE HYDROCHLORIDE 50 MG/1
150 TABLET, FILM COATED ORAL AT BEDTIME
Qty: 270 TABLET | Refills: 1 | Status: SHIPPED | OUTPATIENT
Start: 2024-08-15

## 2024-08-15 RX ORDER — EMTRICITABINE AND TENOFOVIR DISOPROXIL FUMARATE 200; 300 MG/1; MG/1
1 TABLET, FILM COATED ORAL DAILY
Qty: 90 TABLET | Refills: 0 | Status: SHIPPED | OUTPATIENT
Start: 2024-08-15

## 2024-08-15 NOTE — PROGRESS NOTES
Assessment & Plan     Bryan was seen today for follow up.    Diagnoses and all orders for this visit:    Screening examination for venereal disease  -     First voided urine-Chlamydia trachomatis/Neisseria gonorrhoeae by PCR; Future  -     Throat-Chlamydia trachomatis/Neisseria gonorrhoeae by PCR  -     Rectum-Chlamydia trachomatis/Neisseria gonorrhoeae by PCR  -     HIV Antigen Antibody Combo Cascade; Future  -     Rapid Plasma Reagin w Rflx to TITER  -     First voided urine-Chlamydia trachomatis/Neisseria gonorrhoeae by PCR  -     HIV Antigen Antibody Combo Cascade  3 site testing, HIV and RPR today. Hx of syphilis treated, so not trep test. HIV resulted neg.      PrEP Candidate  -     emtricitabine-tenofovir (TRUVADA) 200-300 MG per tablet; Take 1 tablet by mouth daily  Refilled meds. Contiues to qualify for PrEP.  - Allergy to Doxy, so unable to use Doxy PEP. Main partners on Doxy PEP.     Insomnia, unspecified type  -     traZODone (DESYREL) 50 MG tablet; Take 3 tablets (150 mg) by mouth at bedtime  Some difficulty sleeping lately. Will increase trazodone from 100 to 150mg. Encouraged to not use it on weekends or if he does not need to sleep earlier to avoid med tolerance.     Essential hypertension  -     lisinopril (ZESTRIL) 5 MG tablet; Take 1 tablet (5 mg) by mouth daily  Stable BP. Refilled meds.    Seasonal affective disorder (H24)  -     sertraline (ZOLOFT) 50 MG tablet; Take 1 tablet (50 mg) by mouth daily  Stable mood. Refilled.     Nausea  -     ondansetron (ZOFRAN ODT) 4 MG ODT tab; Take 1 tablet (4 mg) by mouth every 8 hours as needed for nausea  Refilled meds. Nausea 2/2 to zepbound occasionally. Stable dose now so less nausea.       The longitudinal plan of care for the diagnosis(es)/condition(s) as documented were addressed during this visit. Due to the added complexity in care, I will continue to support Bryan in the subsequent management and with ongoing continuity of care.      Return in  "about 3 months (around 11/15/2024).    Subjective   Bryan is a 44 year old, presenting for the following health issues:  Follow Up (3 month PREP follow up )        8/15/2024     4:26 PM   Additional Questions   Roomed by Lizzie   Accompanied by self         8/15/2024    Information    services provided? No        HPI       Zepbound  - on 15mg now, has a 90 day fill. Has been off of it for 5-6 weeks.   - Down from 260s to 230s!  - Working out consistently.  - Is limiting snacking and is following nutrition plan     PrEP going well  - No new exposures.         Objective    BP 96/67   Pulse 112   Temp 98.3  F (36.8  C) (Oral)   Resp 19   Ht 1.803 m (5' 11\")   Wt 107.2 kg (236 lb 6.4 oz)   SpO2 98%   BMI 32.97 kg/m    Body mass index is 32.97 kg/m .  Physical Exam   GENERAL: alert and no distress  EYES: Eyes grossly normal to inspection, PERRL and conjunctivae and sclerae normal  HENT: ear canals and TM's normal, nose and mouth without ulcers or lesions  NECK: no adenopathy, no asymmetry, masses, or scars  RESP: lungs clear to auscultation - no rales, rhonchi or wheezes  CV: regular rate and rhythm, normal S1 S2, no S3 or S4, no murmur, click or rub, no peripheral edema  ABDOMEN: soft, nontender, no hepatosplenomegaly, no masses and bowel sounds normal  RECTAL: normal sphincter tone, no rectal masses, prostate normal size, smooth, nontender without nodules or masses  MS: no gross musculoskeletal defects noted, no edema  SKIN: no suspicious lesions or rashes  NEURO: Normal strength and tone, mentation intact and speech normal  PSYCH: mentation appears normal, affect normal/bright    Results for orders placed or performed in visit on 08/15/24 (from the past 24 hour(s))   HIV Antigen Antibody Combo Cascade   Result Value Ref Range    HIV Antigen Antibody Combo Nonreactive Nonreactive           Signed Electronically by: Jovani Montesinos, DO    "

## 2024-08-16 LAB
C TRACH DNA SPEC QL PROBE+SIG AMP: NEGATIVE
N GONORRHOEA DNA SPEC QL NAA+PROBE: NEGATIVE
RPR SER QL: REACTIVE
RPR SER-TITR: NORMAL {TITER}

## 2024-08-31 ENCOUNTER — MYC REFILL (OUTPATIENT)
Dept: FAMILY MEDICINE | Facility: CLINIC | Age: 44
End: 2024-08-31
Payer: COMMERCIAL

## 2024-08-31 DIAGNOSIS — F90.2 ADHD (ATTENTION DEFICIT HYPERACTIVITY DISORDER), COMBINED TYPE: Chronic | ICD-10-CM

## 2024-09-03 RX ORDER — METHYLPHENIDATE HYDROCHLORIDE 54 MG/1
54 TABLET ORAL DAILY
Qty: 30 TABLET | Refills: 0 | Status: CANCELLED | OUTPATIENT
Start: 2024-09-03

## 2024-09-03 RX ORDER — METHYLPHENIDATE HYDROCHLORIDE 54 MG/1
54 TABLET ORAL DAILY
Qty: 30 TABLET | Refills: 0 | Status: SHIPPED | OUTPATIENT
Start: 2024-09-03 | End: 2024-10-03

## 2024-09-03 RX ORDER — METHYLPHENIDATE HYDROCHLORIDE 54 MG/1
54 TABLET ORAL DAILY
Qty: 30 TABLET | Refills: 0 | Status: SHIPPED | OUTPATIENT
Start: 2024-10-03 | End: 2024-11-02

## 2024-09-03 RX ORDER — METHYLPHENIDATE HYDROCHLORIDE 54 MG/1
54 TABLET ORAL DAILY
Qty: 30 TABLET | Refills: 0 | Status: SHIPPED | OUTPATIENT
Start: 2024-11-02 | End: 2024-12-02

## 2024-09-03 NOTE — TELEPHONE ENCOUNTER
"Request for medication refill:  methylphenidate HCl ER, OSM, (CONCERTA) 54 MG CR tablet   Providers if patient needs an appointment and you are willing to give a one month supply please refill for one month and  send a letter/MyChart using \".SMILLIMITEDREFILL\" .smillimited and route chart to \"P [facility] \" (Giving one month refill in non controlled medications is strongly recommended before denial)    If refill has been denied, meaning absolutely no refills without visit, please complete the smart phrase \".smirxrefuse\" and route it to the \"P [facility] MED REFILLS\"  pool to inform the patient and the pharmacy.    Arsenio Ortega MA     "

## 2024-10-05 ENCOUNTER — HEALTH MAINTENANCE LETTER (OUTPATIENT)
Age: 44
End: 2024-10-05

## 2024-10-31 ENCOUNTER — OFFICE VISIT (OUTPATIENT)
Dept: FAMILY MEDICINE | Facility: CLINIC | Age: 44
End: 2024-10-31
Payer: COMMERCIAL

## 2024-10-31 VITALS
WEIGHT: 233.1 LBS | HEART RATE: 100 BPM | RESPIRATION RATE: 19 BRPM | DIASTOLIC BLOOD PRESSURE: 66 MMHG | SYSTOLIC BLOOD PRESSURE: 97 MMHG | TEMPERATURE: 99.7 F | BODY MASS INDEX: 32.63 KG/M2 | OXYGEN SATURATION: 97 % | HEIGHT: 71 IN

## 2024-10-31 DIAGNOSIS — Z11.3 SCREENING EXAMINATION FOR STI: ICD-10-CM

## 2024-10-31 DIAGNOSIS — Z11.4 SCREENING FOR HIV (HUMAN IMMUNODEFICIENCY VIRUS): ICD-10-CM

## 2024-10-31 DIAGNOSIS — Z23 IMMUNIZATION DUE: Primary | ICD-10-CM

## 2024-10-31 DIAGNOSIS — Z20.6 CONTACT WITH AND (SUSPECTED) EXPOSURE TO HUMAN IMMUNODEFICIENCY VIRUS (HIV): ICD-10-CM

## 2024-10-31 DIAGNOSIS — R53.83 OTHER FATIGUE: ICD-10-CM

## 2024-10-31 DIAGNOSIS — F90.2 ADHD (ATTENTION DEFICIT HYPERACTIVITY DISORDER), COMBINED TYPE: Chronic | ICD-10-CM

## 2024-10-31 DIAGNOSIS — E66.01 MORBID OBESITY (H): Chronic | ICD-10-CM

## 2024-10-31 DIAGNOSIS — G47.33 OSA (OBSTRUCTIVE SLEEP APNEA): Chronic | ICD-10-CM

## 2024-10-31 LAB
ERYTHROCYTE [DISTWIDTH] IN BLOOD BY AUTOMATED COUNT: 13.3 % (ref 10–15)
EST. AVERAGE GLUCOSE BLD GHB EST-MCNC: 94 MG/DL
HBA1C MFR BLD: 4.9 % (ref 0–5.6)
HCT VFR BLD AUTO: 49.6 % (ref 40–53)
HGB BLD-MCNC: 16.8 G/DL (ref 13.3–17.7)
MCH RBC QN AUTO: 29.3 PG (ref 26.5–33)
MCHC RBC AUTO-ENTMCNC: 33.9 G/DL (ref 31.5–36.5)
MCV RBC AUTO: 87 FL (ref 78–100)
PLATELET # BLD AUTO: 252 10E3/UL (ref 150–450)
RBC # BLD AUTO: 5.73 10E6/UL (ref 4.4–5.9)
WBC # BLD AUTO: 6.4 10E3/UL (ref 4–11)

## 2024-10-31 PROCEDURE — 36415 COLL VENOUS BLD VENIPUNCTURE: CPT | Performed by: STUDENT IN AN ORGANIZED HEALTH CARE EDUCATION/TRAINING PROGRAM

## 2024-10-31 PROCEDURE — 90656 IIV3 VACC NO PRSV 0.5 ML IM: CPT | Performed by: STUDENT IN AN ORGANIZED HEALTH CARE EDUCATION/TRAINING PROGRAM

## 2024-10-31 PROCEDURE — 90471 IMMUNIZATION ADMIN: CPT | Performed by: STUDENT IN AN ORGANIZED HEALTH CARE EDUCATION/TRAINING PROGRAM

## 2024-10-31 PROCEDURE — 83036 HEMOGLOBIN GLYCOSYLATED A1C: CPT | Performed by: STUDENT IN AN ORGANIZED HEALTH CARE EDUCATION/TRAINING PROGRAM

## 2024-10-31 PROCEDURE — 86592 SYPHILIS TEST NON-TREP QUAL: CPT | Performed by: STUDENT IN AN ORGANIZED HEALTH CARE EDUCATION/TRAINING PROGRAM

## 2024-10-31 PROCEDURE — 80053 COMPREHEN METABOLIC PANEL: CPT | Performed by: STUDENT IN AN ORGANIZED HEALTH CARE EDUCATION/TRAINING PROGRAM

## 2024-10-31 PROCEDURE — 99214 OFFICE O/P EST MOD 30 MIN: CPT | Mod: 25 | Performed by: STUDENT IN AN ORGANIZED HEALTH CARE EDUCATION/TRAINING PROGRAM

## 2024-10-31 PROCEDURE — 85027 COMPLETE CBC AUTOMATED: CPT | Performed by: STUDENT IN AN ORGANIZED HEALTH CARE EDUCATION/TRAINING PROGRAM

## 2024-10-31 PROCEDURE — 87389 HIV-1 AG W/HIV-1&-2 AB AG IA: CPT | Performed by: STUDENT IN AN ORGANIZED HEALTH CARE EDUCATION/TRAINING PROGRAM

## 2024-10-31 PROCEDURE — 82306 VITAMIN D 25 HYDROXY: CPT | Performed by: STUDENT IN AN ORGANIZED HEALTH CARE EDUCATION/TRAINING PROGRAM

## 2024-10-31 PROCEDURE — 86593 SYPHILIS TEST NON-TREP QUANT: CPT | Performed by: STUDENT IN AN ORGANIZED HEALTH CARE EDUCATION/TRAINING PROGRAM

## 2024-10-31 PROCEDURE — 84443 ASSAY THYROID STIM HORMONE: CPT | Performed by: STUDENT IN AN ORGANIZED HEALTH CARE EDUCATION/TRAINING PROGRAM

## 2024-10-31 RX ORDER — METHYLPHENIDATE HYDROCHLORIDE 54 MG/1
54 TABLET ORAL DAILY
Qty: 30 TABLET | Refills: 0 | Status: SHIPPED | OUTPATIENT
Start: 2024-12-30 | End: 2025-01-29

## 2024-10-31 RX ORDER — EMTRICITABINE AND TENOFOVIR DISOPROXIL FUMARATE 200; 300 MG/1; MG/1
1 TABLET, FILM COATED ORAL DAILY
Qty: 90 TABLET | Refills: 0 | Status: SHIPPED | OUTPATIENT
Start: 2024-10-31

## 2024-10-31 RX ORDER — METHYLPHENIDATE HYDROCHLORIDE 54 MG/1
54 TABLET ORAL DAILY
Qty: 30 TABLET | Refills: 0 | Status: SHIPPED | OUTPATIENT
Start: 2024-11-30 | End: 2024-12-30

## 2024-10-31 ASSESSMENT — PAIN SCALES - GENERAL: PAINLEVEL_OUTOF10: NO PAIN (0)

## 2024-10-31 NOTE — PROGRESS NOTES
"  Assessment & Plan     Immunization due  - INFLUENZA VACCINE,SPLIT VIRUS,TRIVALENT,PF(FLUZONE)    Screening examination for STI  Stable/neg screen. Serofast.   - Rapid Plasma Reagin w Rflx to TITER  - Rapid Plasma Reagin w Rflx to TITER    Screening for HIV (human immunodeficiency virus)  Negative screening I past, in process.   - HIV test ordered    Other fatigue  Multifactorial likely. Labs below ordered and resulted as reassuring. Will have pt explore BRENDA and sleep considerations related to fatigue. May also be related to non metabolic conditions such as mood.    - Vitamin D Level  - TSH with free T4 reflex  - Comprehensive Metabolic Panel  - CBC with Platelets and Reflex to Iron Studies  - Vitamin D Level  - TSH with free T4 reflex  - Comprehensive Metabolic Panel  - CBC with Platelets and Reflex to Iron Studies    PrEP Candidate  - emtricitabine-tenofovir (TRUVADA) 200-300 MG per tablet  Dispense: 90 tablet; Refill: 0    Morbid obesity (H)  - Continue q7d Tirzepatide 15MG/0.5ML injections  - Hemoglobin A1c normal today    ADHD (attention deficit hyperactivity disorder), combined type  Relatively stable, sleepiness seems to be uncontrolled, see below.   - methylphenidate HCl ER, OSM, (CONCERTA) 54 MG CR tablet  Dispense: 30 tablet; Refill: 0  - methylphenidate HCl ER, OSM, (CONCERTA) 54 MG CR tablet  Dispense: 30 tablet; Refill: 0    BRENDA (obstructive sleep apnea)  - Adult Sleep Eval & Management Referral  - Explore alternative options to CPAP machine, discussed nasal CPAP as a potential option    BMI  Estimated body mass index is 32.51 kg/m  as calculated from the following:    Height as of this encounter: 1.803 m (5' 11\").    Weight as of this encounter: 105.7 kg (233 lb 1.6 oz).   Weight management plan: Discussed healthy diet and exercise guidelines        Return in about 3 months (around 1/31/2025).    Shana Adams is a 44 year old, presenting for the following health issues:  RECHECK (PrEP/Energy " "Concerns)      10/31/2024     4:04 PM   Additional Questions   Roomed by [place]   Accompanied by Self         10/31/2024    Information    services provided? No        HPI   Bryan is a 43 y/o male with a history of obstructive sleep apnea, seasonal effective disorder, and latent syphillis who presents to clinic for follow-up on PrEP labs and a 2 month history of decreased energy and motivation. He does not state a specific inciting event for this, however has shared that work has been a bit stressful. He does no endorse sleepiness but feels as if he \"doesn't have the ambition to get things done\". He states that it seems to be more prounced in the evening or on the weekends. He has endorsed some weight loss and decrease in appetite but attributes it to the tirzapatide he has been using for the past 6-7 months. He also states that his feet have been cold over the past 4-6 weeks, deviating from his baseline.           Objective    BP 97/66 (BP Location: Left arm, Patient Position: Sitting, Cuff Size: Adult Large)   Pulse 100   Temp 99.7  F (37.6  C) (Oral)   Resp 19   Ht 1.803 m (5' 11\")   Wt 105.7 kg (233 lb 1.6 oz)   SpO2 97%   BMI 32.51 kg/m    Body mass index is 32.51 kg/m .  Physical Exam   GENERAL: alert and no distress  NECK: no adenopathy, no asymmetry, masses, or scars  RESP: lungs clear to auscultation - no rales, rhonchi or wheezes  CV: regular rate and rhythm, normal S1 S2, no S3 or S4, no murmur, click or rub, no peripheral edema    Results for orders placed or performed in visit on 10/31/24   Rapid Plasma Reagin w Rflx to TITER     Status: Abnormal   Result Value Ref Range    Rapid Plasma Reagin Reactive (A) Nonreactive    Narrative    This test should not be used as a primary diagnostic approach for syphilis, and a serum specimen should be submitted for a treponemal-specific antibody test. Biological false-positive reactions with cardiolipin-type antigens have been reported in " disease such as infectious mononucleosis, leprosy, malaria, lupus erythematosus, vaccinia, and viral pneumonia.  Pregnancy, autoimmune diseases, and narcotic additions may give false-positives. Pinta, yaws, bejel, and other treponemal diseases may also produce false-positive results with this test.   Vitamin D Level     Status: Normal   Result Value Ref Range    Vitamin D, Total (25-Hydroxy) 32 20 - 50 ng/mL    Narrative    Season, race, dietary intake, and treatment affect the concentration of 25-hydroxy-Vitamin D. Values may decrease during winter months and increase during summer months.    Vitamin D determination is routinely performed by an immunoassay specific for 25 hydroxyvitamin D3.  If an individual is on vitamin D2(ergocalciferol) supplementation, please specify 25 OH vitamin D2 and D3 level determination by LCMSMS test VITD23.     TSH with free T4 reflex     Status: Normal   Result Value Ref Range    TSH 2.06 0.30 - 4.20 uIU/mL   Comprehensive Metabolic Panel     Status: Normal   Result Value Ref Range    Sodium 137 135 - 145 mmol/L    Potassium 4.1 3.4 - 5.3 mmol/L    Carbon Dioxide (CO2) 23 22 - 29 mmol/L    Anion Gap 11 7 - 15 mmol/L    Urea Nitrogen 13.4 6.0 - 20.0 mg/dL    Creatinine 1.05 0.67 - 1.17 mg/dL    GFR Estimate 90 >60 mL/min/1.73m2    Calcium 9.0 8.8 - 10.4 mg/dL    Chloride 103 98 - 107 mmol/L    Glucose 84 70 - 99 mg/dL    Alkaline Phosphatase 53 40 - 150 U/L    AST 39 0 - 45 U/L    ALT 32 0 - 70 U/L    Protein Total 7.2 6.4 - 8.3 g/dL    Albumin 4.3 3.5 - 5.2 g/dL    Bilirubin Total 0.2 <=1.2 mg/dL   Hemoglobin A1c     Status: Normal   Result Value Ref Range    Estimated Average Glucose 94 <117 mg/dL    Hemoglobin A1C 4.9 0.0 - 5.6 %   CBC with Platelets and Reflex to Iron Studies     Status: Normal   Result Value Ref Range    WBC Count 6.4 4.0 - 11.0 10e3/uL    RBC Count 5.73 4.40 - 5.90 10e6/uL    Hemoglobin 16.8 13.3 - 17.7 g/dL    Hematocrit 49.6 40.0 - 53.0 %    MCV 87 78 - 100 fL     MCH 29.3 26.5 - 33.0 pg    MCHC 33.9 31.5 - 36.5 g/dL    RDW 13.3 10.0 - 15.0 %    Platelet Count 252 150 - 450 10e3/uL   Extra Green Top (Lithium Heparin) Tube     Status: None   Result Value Ref Range    Hold Specimen JIC    Rapid Plasma Reagin Titer     Status: None   Result Value Ref Range    Rapid Plasma Reagin Titer 1:2 Non Reactive   CBC with Platelets and Reflex to Iron Studies     Status: None    Narrative    The following orders were created for panel order CBC with Platelets and Reflex to Iron Studies.  Procedure                               Abnormality         Status                     ---------                               -----------         ------                     CBC with Platelets and R...[[id number]]  Normal              Final result               Extra Green Top (Lithium...[[id number]]                      Final result                 Please view results for these tests on the individual orders.         Felecia Bahena, MS3  [employer]     I, Jovani Montesinos DO, was present with the medical student who participated in the service and in the documentation of the note.  I have verified the history and personally performed the physical exam and medical decision making, and have verified the content of the note, which accurately reflects me assessment of the plan of care as documented in the note.      The longitudinal plan of care for the diagnosis(es)/condition(s) as documented were addressed during this visit. Due to the added complexity in care, I will continue to support Bryan in the subsequent management and with ongoing continuity of care.    Jovani Montesinos DO      Signed Electronically by: Jovani Montesinos DO

## 2024-11-01 LAB
ALBUMIN SERPL BCG-MCNC: 4.3 G/DL (ref 3.5–5.2)
ALP SERPL-CCNC: 53 U/L (ref 40–150)
ALT SERPL W P-5'-P-CCNC: 32 U/L (ref 0–70)
ANION GAP SERPL CALCULATED.3IONS-SCNC: 11 MMOL/L (ref 7–15)
AST SERPL W P-5'-P-CCNC: 39 U/L (ref 0–45)
BILIRUB SERPL-MCNC: 0.2 MG/DL
BUN SERPL-MCNC: 13.4 MG/DL (ref 6–20)
CALCIUM SERPL-MCNC: 9 MG/DL (ref 8.8–10.4)
CHLORIDE SERPL-SCNC: 103 MMOL/L (ref 98–107)
CREAT SERPL-MCNC: 1.05 MG/DL (ref 0.67–1.17)
EGFRCR SERPLBLD CKD-EPI 2021: 90 ML/MIN/1.73M2
GLUCOSE SERPL-MCNC: 84 MG/DL (ref 70–99)
HCO3 SERPL-SCNC: 23 MMOL/L (ref 22–29)
HOLD SPECIMEN: NORMAL
POTASSIUM SERPL-SCNC: 4.1 MMOL/L (ref 3.4–5.3)
PROT SERPL-MCNC: 7.2 G/DL (ref 6.4–8.3)
RPR SER QL: REACTIVE
RPR SER-TITR: NORMAL {TITER}
SODIUM SERPL-SCNC: 137 MMOL/L (ref 135–145)
TSH SERPL DL<=0.005 MIU/L-ACNC: 2.06 UIU/ML (ref 0.3–4.2)
VIT D+METAB SERPL-MCNC: 32 NG/ML (ref 20–50)

## 2024-11-03 LAB — HIV 1+2 AB+HIV1 P24 AG SERPL QL IA: NONREACTIVE

## 2024-11-07 ASSESSMENT — PATIENT HEALTH QUESTIONNAIRE - PHQ9: SUM OF ALL RESPONSES TO PHQ QUESTIONS 1-9: 3

## 2024-12-14 ENCOUNTER — MYC MEDICAL ADVICE (OUTPATIENT)
Dept: FAMILY MEDICINE | Facility: CLINIC | Age: 44
End: 2024-12-14
Payer: COMMERCIAL

## 2024-12-14 DIAGNOSIS — E66.01 MORBID OBESITY (H): Chronic | ICD-10-CM

## 2024-12-17 ENCOUNTER — TELEPHONE (OUTPATIENT)
Dept: FAMILY MEDICINE | Facility: CLINIC | Age: 44
End: 2024-12-17
Payer: COMMERCIAL

## 2024-12-17 RX ORDER — TIRZEPATIDE 15 MG/.5ML
15 INJECTION, SOLUTION SUBCUTANEOUS
Qty: 6 ML | Refills: 3 | Status: SHIPPED | OUTPATIENT
Start: 2024-12-17

## 2024-12-17 NOTE — TELEPHONE ENCOUNTER
Prior Authorization Specialty Medication Request    Medication/Dose: ZEPBOUND 15 MG/0.5ML prefilled pen   Diagnosis and ICD code (if different than what is on RX):    New/renewal/insurance change PA/secondary ins. PA:  Previously Tried and Failed:      Important Lab Values:   Rationale:     Insurance   Primary:     [employer] [place]     Insurance ID:  [id number]     Secondary (if applicable):  Insurance ID:      Pharmacy Information (if different than what is on RX)  Name:    Phone:    Fax:    Clinic Information  Preferred routing pool for dept communication: p [facility] triage nurse    Laisha Archer RN

## 2024-12-18 NOTE — TELEPHONE ENCOUNTER
Prior Authorization Approval    Medication: ZEPBOUND 15 MG/0.5ML SC SOAJ  Authorization Effective Date: 11/18/2024  Authorization Expiration Date: 12/18/2025  Approved Dose/Quantity: as written  Reference #: [id number]   Insurance Company: [employer] Non-Specialty PA's - Phone [phone] Fax [phone]  Which Pharmacy is filling the prescription: [facility] - [address]  Pharmacy Notified: y  Patient Notified: Instructed pharmacy to notify patient once order is ready.

## 2025-01-15 ENCOUNTER — ANCILLARY PROCEDURE (OUTPATIENT)
Dept: GENERAL RADIOLOGY | Facility: CLINIC | Age: 45
End: 2025-01-15
Attending: FAMILY MEDICINE
Payer: COMMERCIAL

## 2025-01-15 ENCOUNTER — OFFICE VISIT (OUTPATIENT)
Dept: FAMILY MEDICINE | Facility: CLINIC | Age: 45
End: 2025-01-15
Payer: COMMERCIAL

## 2025-01-15 VITALS
SYSTOLIC BLOOD PRESSURE: 114 MMHG | RESPIRATION RATE: 16 BRPM | HEART RATE: 82 BPM | OXYGEN SATURATION: 96 % | TEMPERATURE: 98 F | HEIGHT: 71 IN | WEIGHT: 241 LBS | BODY MASS INDEX: 33.74 KG/M2 | DIASTOLIC BLOOD PRESSURE: 76 MMHG

## 2025-01-15 DIAGNOSIS — M79.671 RIGHT FOOT PAIN: ICD-10-CM

## 2025-01-15 DIAGNOSIS — Z11.3 SCREENING EXAMINATION FOR VENEREAL DISEASE: Primary | ICD-10-CM

## 2025-01-15 DIAGNOSIS — Z20.6 CONTACT WITH AND (SUSPECTED) EXPOSURE TO HUMAN IMMUNODEFICIENCY VIRUS (HIV): ICD-10-CM

## 2025-01-15 LAB
ANION GAP SERPL CALCULATED.3IONS-SCNC: 8 MMOL/L (ref 7–15)
BUN SERPL-MCNC: 9.6 MG/DL (ref 6–20)
CALCIUM SERPL-MCNC: 9.1 MG/DL (ref 8.8–10.4)
CHLORIDE SERPL-SCNC: 102 MMOL/L (ref 98–107)
CREAT SERPL-MCNC: 1.05 MG/DL (ref 0.67–1.17)
EGFRCR SERPLBLD CKD-EPI 2021: 90 ML/MIN/1.73M2
GLUCOSE SERPL-MCNC: 87 MG/DL (ref 70–99)
HCO3 SERPL-SCNC: 26 MMOL/L (ref 22–29)
HIV 1+2 AB+HIV1 P24 AG SERPL QL IA: NONREACTIVE
POTASSIUM SERPL-SCNC: 4.2 MMOL/L (ref 3.4–5.3)
SODIUM SERPL-SCNC: 136 MMOL/L (ref 135–145)

## 2025-01-15 PROCEDURE — 73630 X-RAY EXAM OF FOOT: CPT | Mod: RT | Performed by: RADIOLOGY

## 2025-01-15 RX ORDER — EMTRICITABINE AND TENOFOVIR DISOPROXIL FUMARATE 200; 300 MG/1; MG/1
1 TABLET, FILM COATED ORAL DAILY
Qty: 90 TABLET | Refills: 0 | Status: SHIPPED | OUTPATIENT
Start: 2025-01-15

## 2025-01-15 NOTE — PATIENT INSTRUCTIONS
Safer Sex: Care Instructions  Overview  Safer sex is a way to reduce your risk of getting a sexually transmitted infection (STI). It can also help prevent pregnancy.  Several products can help you practice safer sex and reduce your chance of STIs. One of the best is a condom. There are internal and external condoms. You can use a special rubber sheet (dental dam) for protection during oral sex. Disposable gloves can keep your hands from touching blood, semen, or other body fluids that can carry infections.  Remember that birth control methods such as diaphragms, IUDs, foams, and birth control pills do not stop you from getting STIs.  Follow-up care is a key part of your treatment and safety. Be sure to make and go to all appointments, and call your doctor if you are having problems. It's also a good idea to know your test results and keep a list of the medicines you take.  How can you care for yourself at home?  Think about getting vaccinated to help prevent hepatitis A, hepatitis B, and human papillomavirus (HPV). They can be spread through sex.  Use a condom every time you have sex. Use an external condom, which goes on the penis. Or use an internal condom, which goes into the vagina or anus.  Make sure you use the right size external condom. A condom that's too small can break easily. A condom that's too big can slip off during sex.  Use a new condom each time you have sex. Be careful not to poke a hole in the condom when you open the wrapper.  Don't use an internal condom and an external condom at the same time.  Never use petroleum jelly (such as Vaseline), grease, hand lotion, baby oil, or anything with oil in it. These products can make holes in the condom.  After intercourse, hold the edge of the condom as you remove it. This will help keep semen from spilling out of the condom.  Do not have sex with anyone who has symptoms of an STI, such as sores on the genitals or mouth.  Do not drink a lot of alcohol or  "use drugs before sex.  Limit your sex partners. Sex with one partner who has sex only with you can reduce your risk of getting an STI.  Don't share sex toys. But if you do share them, use a condom and clean the sex toys between each use.  Talk to any partners before you have sex. Talk about what you feel comfortable with and whether you have any boundaries with sex. And find out if your partner or partners may be at risk for any STI. Keep in mind that a person may be able to spread an STI even if they do not have symptoms. You and any partners may want to get tested for STIs.  Where can you learn more?  Go to https://www.[employer].net/patiented  Enter B608 in the search box to learn more about \"Safer Sex: Care Instructions.\"  Current as of: April 30, 2024  Content Version: 14.3    2024 [employer].   Care instructions adapted under license by your healthcare professional. If you have questions about a medical condition or this instruction, always ask your healthcare professional. [employer] disclaims any warranty or liability for your use of this information.    "

## 2025-01-15 NOTE — PROGRESS NOTES
"  Assessment & Plan     PrEP Candidate  Screening examination for venereal disease  - On PrEP routine monitoring as below  - Hx of positive 1:2 titer of syphilis, would only consider treatment if titer increasing  - emtricitabine-tenofovir (TRUVADA) 200-300 MG per tablet; Take 1 tablet by mouth daily.  - First voided urine-Chlamydia trachomatis/Neisseria gonorrhoeae by PCR; Future  - Throat-Chlamydia trachomatis/Neisseria gonorrhoeae by PCR  - Rectum-Chlamydia trachomatis/Neisseria gonorrhoeae by PCR  - HIV Antigen Antibody Combo Cascade; Future  - Treponema Abs w Reflex to RPR and Titer; Future  - Basic metabolic panel; Standing    Right foot pain  - Acute on chronic pain.   - Has failed insoles therapy  '- Obtain XR for structural evaluation, consider referral to podiatry based on results.   - XR Foot Right G/E 3 Views; Future      Follow up in 3 months for PrEP labs    Subjective   Bryan is a 44 year old, presenting for the following health issues:  Clinic Care Coordination - Follow-up (Prep follow up ) and Musculoskeletal Problem (Foot concerns for couple of months. )      1/15/2025    10:34 AM   Additional Questions   Roomed by Lyubov   Accompanied by self         1/15/2025    Information    services provided? No     HPI   On truvada for PrEP, misses a dose less than weekly. Partner had an exposure ot gonorrhea but tested negative at last test.    Intermittent pain in the ball of his right foot for years. Intiially started in college  and had evaluation and \"metatarsals were shifting and pinching the nerve.\" Recently changed jobs and is more sedentary, but that has not improved pain. Pain is improved with being off feet, worse with walking for >10 minutes. Uses insoles while at work, which has helped in the past but minimal improvement. No numbness/tingling sensation.           Objective    /76   Pulse 82   Temp 98  F (36.7  C) (Oral)   Resp 16   Ht 1.803 m (5' 11\")   Wt 109.3 kg " (241 lb)   SpO2 96%   BMI 33.61 kg/m    Body mass index is 33.61 kg/m .  Physical Exam   General No acute distress, atraumatic  Resp: No respiratory distress, no accessory muscle use  Card: well perfused, no cyanosis  Neuro: Moving all extremities  Psych: Normal mood, appropriate affect  Foot: Tenderness at distal end of first metatarsal on plantar surface. No joint tenderness with compression of 1 MTP joint. No masses palpated between toes or ball of foot. Sensation intact.         Signed Electronically by: Edwin Link MD

## 2025-01-16 ENCOUNTER — MYC REFILL (OUTPATIENT)
Dept: FAMILY MEDICINE | Facility: CLINIC | Age: 45
End: 2025-01-16
Payer: COMMERCIAL

## 2025-01-16 DIAGNOSIS — F90.2 ADHD (ATTENTION DEFICIT HYPERACTIVITY DISORDER), COMBINED TYPE: Chronic | ICD-10-CM

## 2025-01-16 DIAGNOSIS — E29.1 HYPOGONADISM MALE: ICD-10-CM

## 2025-01-16 LAB
C TRACH DNA SPEC QL PROBE+SIG AMP: NEGATIVE
N GONORRHOEA DNA SPEC QL NAA+PROBE: NEGATIVE
RPR SER QL: REACTIVE
RPR SER-TITR: NORMAL {TITER}
SPECIMEN TYPE: NORMAL
T PALLIDUM AB SER QL: REACTIVE

## 2025-01-16 RX ORDER — METHYLPHENIDATE HYDROCHLORIDE 54 MG/1
54 TABLET ORAL DAILY
Qty: 30 TABLET | Refills: 0 | Status: SHIPPED | OUTPATIENT
Start: 2025-01-16

## 2025-01-16 RX ORDER — TESTOSTERONE CYPIONATE 200 MG/ML
200 INJECTION, SOLUTION INTRAMUSCULAR WEEKLY
Qty: 12 ML | Refills: 3 | Status: SHIPPED | OUTPATIENT
Start: 2025-01-16

## 2025-01-16 NOTE — TELEPHONE ENCOUNTER
"Request for medication refill:  methylphenidate HCl ER, OSM, (CONCERTA) 54 MG CR tablet   testosterone cypionate (DEPOTESTOSTERONE) 200 MG/ML injection     Providers if patient needs an appointment and you are willing to give a one month supply please refill for one month and  send a letter/MyChart using \".SMILLIMITEDREFILL\" .smillimited and route chart to \"P [facility] \" (Giving one month refill in non controlled medications is strongly recommended before denial)    If refill has been denied, meaning absolutely no refills without visit, please complete the smart phrase \".smirxrefuse\" and route it to the \"P [facility] MED REFILLS\"  pool to inform the patient and the pharmacy.    Laisha Archer RN  -----------    PDMP reviewed, appropriate  Will fill as requested for both, will be due for repeat labs at follow up for testosterone.  Edwin Link MD    "

## 2025-01-28 ENCOUNTER — ANCILLARY PROCEDURE (OUTPATIENT)
Dept: GENERAL RADIOLOGY | Facility: CLINIC | Age: 45
End: 2025-01-28
Attending: PHYSICIAN ASSISTANT
Payer: COMMERCIAL

## 2025-01-28 ENCOUNTER — OFFICE VISIT (OUTPATIENT)
Dept: URGENT CARE | Facility: URGENT CARE | Age: 45
End: 2025-01-28
Payer: COMMERCIAL

## 2025-01-28 VITALS
OXYGEN SATURATION: 96 % | DIASTOLIC BLOOD PRESSURE: 70 MMHG | SYSTOLIC BLOOD PRESSURE: 108 MMHG | TEMPERATURE: 98.3 F | BODY MASS INDEX: 34.42 KG/M2 | HEART RATE: 85 BPM | WEIGHT: 246.8 LBS | RESPIRATION RATE: 16 BRPM

## 2025-01-28 DIAGNOSIS — M25.562 ACUTE PAIN OF LEFT KNEE: Primary | ICD-10-CM

## 2025-01-28 DIAGNOSIS — M25.562 ACUTE PAIN OF LEFT KNEE: ICD-10-CM

## 2025-01-28 PROCEDURE — 73562 X-RAY EXAM OF KNEE 3: CPT | Mod: TC | Performed by: RADIOLOGY

## 2025-01-28 PROCEDURE — 99213 OFFICE O/P EST LOW 20 MIN: CPT | Performed by: PHYSICIAN ASSISTANT

## 2025-01-28 ASSESSMENT — PAIN SCALES - GENERAL: PAINLEVEL_OUTOF10: MODERATE PAIN (6)

## 2025-01-28 NOTE — PROGRESS NOTES
Chief Complaint   Patient presents with    Knee Pain     Left knee pain since Sunday. Unknown injury.      X-ray-I see no obvious fracture    Results for orders placed or performed in visit on 01/28/25   XR Knee Left 3 Views     Status: None (Preliminary result)    Narrative    EXAM: XR KNEE LEFT 3 VIEWS  LOCATION: [facility]  DATE: 1/28/2025    INDICATION:  Acute pain of left knee.  COMPARISON: None.      Impression    IMPRESSION: Mild patellofemoral osteoarthrosis. No fracture, effusion or calcified intra-articular body.         ASSESSMENT:    ICD-10-CM    1. Acute pain of left knee  M25.562             PLAN: Continue brace, ice, elevate, NSAIDs.  Referral to Ortho for evaluation and treatment.  MCL sprain versus meniscal injury     Tg Harris PA-C        SUBJECTIVE:  Bryan Salazar is an 44 year old male who presents with left knee injury that occurred 2 days ago, 1/26/2025.  Like he just stepped wrong and felt sudden pain medial aspect.  No catching or locking.  Occasional popping.  Does not feel like it is going to give out.  Did buy a brace that seems to help.  No prior knee injury.  No numbness or tingling.  Taking over-the-counter anti-inflammatories.    Past Medical History:   Diagnosis Date    Hypertension      History   Smoking Status    Former    Types: Cigarettes   Smokeless Tobacco    Never       ROS:  GEN no fevers  SKIN no erythema  Musculoskeletal:  See HPI.      OBJECTIVE:  Blood pressure 108/70, pulse 85, temperature 98.3  F (36.8  C), temperature source Oral, resp. rate 16, weight 111.9 kg (246 lb 12.8 oz), SpO2 96%.  Patient is alert and NAD.  EYES: conjunctiva clear  Knee Exam (left):  Inspection: No obvious swelling or bruising  Palpation: Tender MCL and medial joint line.  No gross joint laxity noted.  Full range of motion here today.  Neurovascularly Intact Distally.         Tg Harris PA-C

## 2025-01-30 NOTE — TELEPHONE ENCOUNTER
DIAGNOSIS: Acute pain of left knee/Tg christensen PA-C/[employer]/Xrays done 01/28/25 at [facility]/ JOSE ALEJANDRO 01/29/25     APPOINTMENT DATE: 2.3.25   NOTES STATUS DETAILS   DISCHARGE REPORT from the ER Internal 1.28.25  [facility]   MEDICATION LIST Internal    XRAYS (IMAGES & REPORTS) Internal 1.28.25  XR Knee Left

## 2025-02-03 ENCOUNTER — PRE VISIT (OUTPATIENT)
Dept: ORTHOPEDICS | Facility: CLINIC | Age: 45
End: 2025-02-03

## 2025-02-03 ENCOUNTER — OFFICE VISIT (OUTPATIENT)
Dept: ORTHOPEDICS | Facility: CLINIC | Age: 45
End: 2025-02-03
Attending: FAMILY MEDICINE
Payer: COMMERCIAL

## 2025-02-03 DIAGNOSIS — M25.562 PATELLOFEMORAL ARTHRALGIA OF LEFT KNEE: Primary | ICD-10-CM

## 2025-02-03 PROCEDURE — 99213 OFFICE O/P EST LOW 20 MIN: CPT | Performed by: FAMILY MEDICINE

## 2025-02-03 ASSESSMENT — ACTIVITIES OF DAILY LIVING (ADL)
KNEE_ACTIVITY_OF_DAILY_LIVING_SUM: 51
KNEEL ON THE FRONT OF YOUR KNEE: ACTIVITY IS NOT DIFFICULT
RISE FROM A CHAIR: ACTIVITY IS NOT DIFFICULT
KNEEL ON THE FRONT OF YOUR KNEE: ACTIVITY IS NOT DIFFICULT
GIVING WAY, BUCKLING OR SHIFTING OF KNEE: THE SYMPTOM AFFECTS MY ACTIVITY MODERATELY
WALK: ACTIVITY IS SOMEWHAT DIFFICULT
STAND: ACTIVITY IS NOT DIFFICULT
STIFFNESS: I DO NOT HAVE THE SYMPTOM
GO UP STAIRS: ACTIVITY IS SOMEWHAT DIFFICULT
STIFFNESS: I DO NOT HAVE THE SYMPTOM
HOW_WOULD_YOU_RATE_THE_CURRENT_FUNCTION_OF_YOUR_KNEE_DURING_YOUR_USUAL_DAILY_ACTIVITIES_ON_A_SCALE_FROM_0_TO_100_WITH_100_BEING_YOUR_LEVEL_OF_KNEE_FUNCTION_PRIOR_TO_YOUR_INJURY_AND_0_BEING_THE_INABILITY_TO_PERFORM_ANY_OF_YOUR_USUAL_DAILY_ACTIVITIES?: 80
SIT WITH YOUR KNEE BENT: ACTIVITY IS NOT DIFFICULT
HOW_WOULD_YOU_RATE_THE_OVERALL_FUNCTION_OF_YOUR_KNEE_DURING_YOUR_USUAL_DAILY_ACTIVITIES?: ABNORMAL
RAW_SCORE: 51
RISE FROM A CHAIR: ACTIVITY IS NOT DIFFICULT
WALK: ACTIVITY IS SOMEWHAT DIFFICULT
KNEE_ACTIVITY_OF_DAILY_LIVING_SCORE: 72.86
SQUAT: ACTIVITY IS SOMEWHAT DIFFICULT
SIT WITH YOUR KNEE BENT: ACTIVITY IS NOT DIFFICULT
PAIN: THE SYMPTOM AFFECTS MY ACTIVITY MODERATELY
PLEASE_INDICATE_YOR_PRIMARY_REASON_FOR_REFERRAL_TO_THERAPY:: KNEE
GIVING WAY, BUCKLING OR SHIFTING OF KNEE: THE SYMPTOM AFFECTS MY ACTIVITY MODERATELY
GO UP STAIRS: ACTIVITY IS SOMEWHAT DIFFICULT
PAIN: THE SYMPTOM AFFECTS MY ACTIVITY MODERATELY
GO DOWN STAIRS: ACTIVITY IS SOMEWHAT DIFFICULT
WEAKNESS: I DO NOT HAVE THE SYMPTOM
GO DOWN STAIRS: ACTIVITY IS SOMEWHAT DIFFICULT
WEAKNESS: I DO NOT HAVE THE SYMPTOM
AS_A_RESULT_OF_YOUR_KNEE_INJURY,_HOW_WOULD_YOU_RATE_YOUR_CURRENT_LEVEL_OF_DAILY_ACTIVITY?: ABNORMAL
HOW_WOULD_YOU_RATE_THE_CURRENT_FUNCTION_OF_YOUR_KNEE_DURING_YOUR_USUAL_DAILY_ACTIVITIES_ON_A_SCALE_FROM_0_TO_100_WITH_100_BEING_YOUR_LEVEL_OF_KNEE_FUNCTION_PRIOR_TO_YOUR_INJURY_AND_0_BEING_THE_INABILITY_TO_PERFORM_ANY_OF_YOUR_USUAL_DAILY_ACTIVITIES?: 80
LIMPING: THE SYMPTOM AFFECTS MY ACTIVITY MODERATELY
HOW_WOULD_YOU_RATE_THE_OVERALL_FUNCTION_OF_YOUR_KNEE_DURING_YOUR_USUAL_DAILY_ACTIVITIES?: ABNORMAL
SWELLING: THE SYMPTOM AFFECTS MY ACTIVITY SLIGHTLY
LIMPING: THE SYMPTOM AFFECTS MY ACTIVITY MODERATELY
SWELLING: THE SYMPTOM AFFECTS MY ACTIVITY SLIGHTLY
AS_A_RESULT_OF_YOUR_KNEE_INJURY,_HOW_WOULD_YOU_RATE_YOUR_CURRENT_LEVEL_OF_DAILY_ACTIVITY?: ABNORMAL
SQUAT: ACTIVITY IS SOMEWHAT DIFFICULT
STAND: ACTIVITY IS NOT DIFFICULT

## 2025-02-03 NOTE — PROGRESS NOTES
Left knee pain ongoing for one week, worsens with walking and being on his feet for long periods of time.  Worse with descending stairs.  Notices a clicking sensation anterior to the knee.  No swelling, no knee locking.  Patient denies a past history of specific knee injury.  He denies chronic problems with his left knee.  Patient reports he's taken about 1 week off due to a left knee discomfort, now improving.  Patient utilized Aleve for knee pain, which has been helpful.  Patient has been involved in treadmill exercise, flat surface;  patient has been involved in exercise on an elliptical .    Standing X-rays of the left knee 1/28/2025 consistent with mild patellofemoral DJD.      Patient has worked as a  for [employer]      Images below reviewed by me:  EXAM: XR KNEE LEFT 3 VIEWS  LOCATION: [facility]  DATE: 1/28/2025     INDICATION:  Acute pain of left knee.  COMPARISON: None.                                                                      IMPRESSION: Mild patellofemoral osteoarthrosis. No fracture, effusion or calcified intra-articular body.          PMH:  Past Medical History:   Diagnosis Date    Hypertension        Active problem list:  Patient Active Problem List   Diagnosis    PrEP Candidate    Essential hypertension    BRENDA (obstructive sleep apnea)    Hypogonadism male    History of latent syphilis    ADHD (attention deficit hyperactivity disorder), combined type    Seasonal affective disorder    Insomnia    Morbid obesity (H)    Gastroesophageal reflux disease without esophagitis       FH:  Family History   Problem Relation Age of Onset    Breast Cancer Mother     Hypertension Father     Hyperlipidemia Father     Arthritis Sister     Arthritis Maternal Grandmother     Lung Cancer Maternal Grandfather     Arthritis Maternal Grandfather     Arthritis Paternal Grandmother     Prostate Cancer Paternal Grandfather     Arthritis Paternal Grandfather      Diabetes Paternal Uncle     Coronary Artery Disease No family hx of     Heart Disease No family hx of     Kidney Disease No family hx of     Asthma No family hx of     Thrombosis No family hx of     Thyroid Disease No family hx of        SH:  Social History     Socioeconomic History    Marital status: Single     Spouse name: Not on file    Number of children: Not on file    Years of education: Not on file    Highest education level: Not on file   Occupational History    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[employer]   Tobacco Use    Smoking status: Former     Current packs/day: 0.00     Types: Cigarettes     Quit date: 2018     Years since quittin.0     Passive exposure: Past    Smokeless tobacco: Never   Vaping Use    Vaping status: Never Used   Substance and Sexual Activity    Alcohol use: Yes     Comment: 1-2 drinks a month    Drug use: Not Currently    Sexual activity: Yes     Partners: Male   Other Topics Concern    Not on file   Social History Narrative    Exec Assistant- working remote for company in [place]      Social Drivers of Health     Financial Resource Strain: Low Risk  (2024)    Financial Resource Strain     Within the past 12 months, have you or your family members you live with been unable to get utilities (heat, electricity) when it was really needed?: No   Food Insecurity: Low Risk  (2024)    Food Insecurity     Within the past 12 months, did you worry that your food would run out before you got money to buy more?: No     Within the past 12 months, did the food you bought just not last and you didn t have money to get more?: No   Transportation Needs: Low Risk  (2024)    Transportation Needs     Within the past 12 months, has lack of transportation kept you from medical appointments, getting your medicines, non-medical meetings or appointments, work, or from getting things that you need?: No   Physical Activity: Insufficiently Active (2024)    Exercise Vital Sign     Days of Exercise per  Week: 3 days     Minutes of Exercise per Session: 30 min   Stress: Stress Concern Present (4/9/2024)    [employer] [place] [employer] - Occupational Stress Questionnaire     Feeling of Stress : To some extent   Social Connections: Unknown (4/9/2024)    Social Connection and Isolation Panel [NHANES]     Frequency of Communication with Friends and Family: Not on file     Frequency of Social Gatherings with Friends and Family: More than three times a week     Attends Latter-day Services: Not on file     Active Member of Clubs or Organizations: Not on file     Attends Club or Organization Meetings: Not on file     Marital Status: Not on file   Interpersonal Safety: Low Risk  (1/15/2025)    Interpersonal Safety     Do you feel physically and emotionally safe where you currently live?: Yes     Within the past 12 months, have you been hit, slapped, kicked or otherwise physically hurt by someone?: No     Within the past 12 months, have you been humiliated or emotionally abused in other ways by your partner or ex-partner?: No   Housing Stability: Low Risk  (4/9/2024)    Housing Stability     Do you have housing? : Yes     Are you worried about losing your housing?: No       MEDS:  See EMR, reviewed  ALL:  See EMR, reviewed    REVIEW OF SYSTEMS:  CONSTITUTIONAL:NEGATIVE for fever, chills, change in weight  INTEGUMENTARY/SKIN: NEGATIVE for worrisome rashes, moles or lesions  EYES: NEGATIVE for vision changes or irritation  ENT/MOUTH: NEGATIVE for ear, mouth and throat problems  RESP:NEGATIVE for significant cough or SOB  BREAST: NEGATIVE for masses, tenderness or discharge  CV: NEGATIVE for chest pain, palpitations or peripheral edema  GI: NEGATIVE for nausea, abdominal pain, heartburn, or change in bowel habits  :NEGATIVE for frequency, dysuria, or hematuria  :NEGATIVE for frequency, dysuria, or hematuria  NEURO: NEGATIVE for weakness, dizziness or paresthesias  ENDOCRINE: NEGATIVE for temperature intolerance,  "skin/hair changes  HEME/ALLERGY/IMMUNE: NEGATIVE for bleeding problems  PSYCHIATRIC: NEGATIVE for changes in mood or affect        Inspection:       No effusion; mildly tender at the medial patellar facet inferiorly.      AP/lateral alignment normal      Non-tender: lateal patellar facets, MCL, LCL, lateral joint line, medial joint line, IT band, pes anserine bursa, fibular head    Symmetrical medial and lateral patellar excursion, with no \"winking\" knee cap or obvious patella amalia    Active Range of Motion: full extension.  flexion allowed beyond 100 degrees.    Strength: quad  5/5, Hamstrings  5/5, Gastroc  5/5, Tibialis anterior  5/5, Peroneals  5/5     core strength -- needs improvement; one-legged squat form:  needs improvement; two-legged squat form:  needs improvement    Patient has a pes cavus foot bilaterally.    Special tests: normal Valgus stress test, normal Varus, negative Lachman's test, negative pivot shift, negative posterior drawer, no posterolateral corner signs, negative Daniel's, no apprehension with lateral stress of the patella.    Sensation intact.  Distal pulses intact.  Capillary refill normal.  Skin intact, without signs of cellulitis.    Appropriate in conversation and affect.      I personally reviewed with the patient the images of his left knee that show no significant tibiofemoral DJD, mild patellofemoral DJD.      Assessment: Patellofemoral discomfort left knee with mild patellofemoral DJD    Plan: We discussed conservative options including over-the-counter pain medicine such as Tylenol and nonsteroidal anti-inflammatories, patellar strap, physical therapy protocols for patellar alignment.  Patient would like to see physical therapy at [facility], referral placed.  Follow-up if not improved.                      "

## 2025-02-03 NOTE — LETTER
2/3/2025      RE: Bryan Salazar  [address]  [place] [facility] [address]     Dear Colleague,    Thank you for referring your patient, Bryan Salazar, to the [facility]. Please see a copy of my visit note below.    Left knee pain ongoing for one week, worsens with walking and being on his feet for long periods of time.  Worse with descending stairs.  Notices a clicking sensation anterior to the knee.  No swelling, no knee locking.  Patient denies a past history of specific knee injury.  He denies chronic problems with his left knee.  Patient reports he's taken about 1 week off due to a left knee discomfort, now improving.  Patient utilized Aleve for knee pain, which has been helpful.  Patient has been involved in treadmill exercise, flat surface;  patient has been involved in exercise on an elliptical .    Standing X-rays of the left knee 1/28/2025 consistent with mild patellofemoral DJD.      Patient has worked as a  for [employer]      Images below reviewed by me:  EXAM: XR KNEE LEFT 3 VIEWS  LOCATION: [facility]  DATE: 1/28/2025     INDICATION:  Acute pain of left knee.  COMPARISON: None.                                                                      IMPRESSION: Mild patellofemoral osteoarthrosis. No fracture, effusion or calcified intra-articular body.          PMH:  Past Medical History:   Diagnosis Date     Hypertension        Active problem list:  Patient Active Problem List   Diagnosis     PrEP Candidate     Essential hypertension     BRENDA (obstructive sleep apnea)     Hypogonadism male     History of latent syphilis     ADHD (attention deficit hyperactivity disorder), combined type     Seasonal affective disorder     Insomnia     Morbid obesity (H)     Gastroesophageal reflux disease without esophagitis       FH:  Family History   Problem Relation Age of Onset     Breast Cancer Mother      Hypertension Father       Hyperlipidemia Father      Arthritis Sister      Arthritis Maternal Grandmother      Lung Cancer Maternal Grandfather      Arthritis Maternal Grandfather      Arthritis Paternal Grandmother      Prostate Cancer Paternal Grandfather      Arthritis Paternal Grandfather      Diabetes Paternal Uncle      Coronary Artery Disease No family hx of      Heart Disease No family hx of      Kidney Disease No family hx of      Asthma No family hx of      Thrombosis No family hx of      Thyroid Disease No family hx of        SH:  Social History     Socioeconomic History     Marital status: Single     Spouse name: Not on file     Number of children: Not on file     Years of education: Not on file     Highest education level: Not on file   Occupational History     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[employer]   Tobacco Use     Smoking status: Former     Current packs/day: 0.00     Types: Cigarettes     Quit date: 2018     Years since quittin.0     Passive exposure: Past     Smokeless tobacco: Never   Vaping Use     Vaping status: Never Used   Substance and Sexual Activity     Alcohol use: Yes     Comment: 1-2 drinks a month     Drug use: Not Currently     Sexual activity: Yes     Partners: Male   Other Topics Concern     Not on file   Social History Narrative    Exec Assistant- working remote for company in [place]      Social Drivers of Health     Financial Resource Strain: Low Risk  (2024)    Financial Resource Strain      Within the past 12 months, have you or your family members you live with been unable to get utilities (heat, electricity) when it was really needed?: No   Food Insecurity: Low Risk  (2024)    Food Insecurity      Within the past 12 months, did you worry that your food would run out before you got money to buy more?: No      Within the past 12 months, did the food you bought just not last and you didn t have money to get more?: No   Transportation Needs: Low Risk  (2024)    Transportation Needs      Within  the past 12 months, has lack of transportation kept you from medical appointments, getting your medicines, non-medical meetings or appointments, work, or from getting things that you need?: No   Physical Activity: Insufficiently Active (4/9/2024)    Exercise Vital Sign      Days of Exercise per Week: 3 days      Minutes of Exercise per Session: 30 min   Stress: Stress Concern Present (4/9/2024)    [place] [employer] - Occupational Stress Questionnaire      Feeling of Stress : To some extent   Social Connections: Unknown (4/9/2024)    Social Connection and Isolation Panel [NHANES]      Frequency of Communication with Friends and Family: Not on file      Frequency of Social Gatherings with Friends and Family: More than three times a week      Attends Restorationism Services: Not on file      Active Member of Clubs or Organizations: Not on file      Attends Club or Organization Meetings: Not on file      Marital Status: Not on file   Interpersonal Safety: Low Risk  (1/15/2025)    Interpersonal Safety      Do you feel physically and emotionally safe where you currently live?: Yes      Within the past 12 months, have you been hit, slapped, kicked or otherwise physically hurt by someone?: No      Within the past 12 months, have you been humiliated or emotionally abused in other ways by your partner or ex-partner?: No   Housing Stability: Low Risk  (4/9/2024)    Housing Stability      Do you have housing? : Yes      Are you worried about losing your housing?: No       MEDS:  See EMR, reviewed  ALL:  See EMR, reviewed    REVIEW OF SYSTEMS:  CONSTITUTIONAL:NEGATIVE for fever, chills, change in weight  INTEGUMENTARY/SKIN: NEGATIVE for worrisome rashes, moles or lesions  EYES: NEGATIVE for vision changes or irritation  ENT/MOUTH: NEGATIVE for ear, mouth and throat problems  RESP:NEGATIVE for significant cough or SOB  BREAST: NEGATIVE for masses, tenderness or discharge  CV: NEGATIVE for chest pain,  "palpitations or peripheral edema  GI: NEGATIVE for nausea, abdominal pain, heartburn, or change in bowel habits  :NEGATIVE for frequency, dysuria, or hematuria  :NEGATIVE for frequency, dysuria, or hematuria  NEURO: NEGATIVE for weakness, dizziness or paresthesias  ENDOCRINE: NEGATIVE for temperature intolerance, skin/hair changes  HEME/ALLERGY/IMMUNE: NEGATIVE for bleeding problems  PSYCHIATRIC: NEGATIVE for changes in mood or affect        Inspection:       No effusion; mildly tender at the medial patellar facet inferiorly.      AP/lateral alignment normal      Non-tender: lateal patellar facets, MCL, LCL, lateral joint line, medial joint line, IT band, pes anserine bursa, fibular head    Symmetrical medial and lateral patellar excursion, with no \"winking\" knee cap or obvious patella amalia    Active Range of Motion: full extension.  flexion allowed beyond 100 degrees.    Strength: quad  5/5, Hamstrings  5/5, Gastroc  5/5, Tibialis anterior  5/5, Peroneals  5/5     core strength -- needs improvement; one-legged squat form:  needs improvement; two-legged squat form:  needs improvement    Patient has a pes cavus foot bilaterally.    Special tests: normal Valgus stress test, normal Varus, negative Lachman's test, negative pivot shift, negative posterior drawer, no posterolateral corner signs, negative Daniel's, no apprehension with lateral stress of the patella.    Sensation intact.  Distal pulses intact.  Capillary refill normal.  Skin intact, without signs of cellulitis.    Appropriate in conversation and affect.      I personally reviewed with the patient the images of his left knee that show no significant tibiofemoral DJD, mild patellofemoral DJD.      Assessment: Patellofemoral discomfort left knee with mild patellofemoral DJD    Plan: We discussed conservative options including over-the-counter pain medicine such as Tylenol and nonsteroidal anti-inflammatories, patellar strap, physical therapy protocols " for patellar alignment.  Patient would like to see physical therapy at [facility], referral placed.  Follow-up if not improved.                        Again, thank you for allowing me to participate in the care of your patient.      Sincerely,    Deandre Monroe MD

## 2025-02-04 ENCOUNTER — THERAPY VISIT (OUTPATIENT)
Dept: PHYSICAL THERAPY | Facility: CLINIC | Age: 45
End: 2025-02-04
Payer: COMMERCIAL

## 2025-02-04 DIAGNOSIS — M25.562 PATELLOFEMORAL ARTHRALGIA OF LEFT KNEE: ICD-10-CM

## 2025-02-04 PROCEDURE — 97161 PT EVAL LOW COMPLEX 20 MIN: CPT | Mod: GP

## 2025-02-04 PROCEDURE — 97110 THERAPEUTIC EXERCISES: CPT | Mod: GP

## 2025-02-04 NOTE — PROGRESS NOTES
"PHYSICAL THERAPY EVALUATION  Type of Visit: Evaluation       Fall Risk Screen:  Fall screen completed by: PT  Have you fallen 2 or more times in the past year?: No  Have you fallen and had an injury in the past year?: Yes (fell and twisted ankle slipping on ice)  Is patient a fall risk?: No    Subjective   Pt notes he has been struggling with L knee pain limiting him with stairs navigation - notes his steps at home do not have a large depth. Pain located on medial aspect of L knee with intermittent popping. He notes pain with prolonged activity and movement after prolonged immobility.  It feels \"achy and weak\"      Presenting condition or subjective complaint: Having pain in my left knee  Date of onset:      Relevant medical history: Depression; High blood pressure; Overweight; Sleep disorder like apnea   Dates & types of surgery:     Past Surgical History:   Procedure Laterality Date    NO HISTORY OF SURGERY         Prior diagnostic imaging/testing results: X-ray     Prior therapy history for the same diagnosis, illness or injury: No      Living Environment  Social support: With a significant other or spouse   Type of home: Multi-level   Stairs to enter the home: Yes 4 Is there a railing: Yes     Ramp: No   Stairs inside the home: Yes 25 Is there a railing: Yes     Help at home: Self Cares (home health aide/personal care attendant, family, etc)  Equipment owned: Straight Cane; Crutches     Employment: Yes Senior   Hobbies/Interests: Gym, Travel, Outdoor activities    Patient goals for therapy: Workout and be active during the day without pain Elliptical, treadmill - wants to get back into strength work       Objective   KNEE EVALUATION  PAIN: Pain Level at Rest: 1/10  Pain Level with Use: 5/10  Pain Location: knee  Pain Quality: Aching and weak  Pain Frequency: intermittent  Pain is Worst: daytime  Pain is Exacerbated By: prolonged activity (walking) and movement after prolonged immobility  Pain is " Relieved By: cold, NSAIDs, and rest  POSTURE:  WFL  GAIT: Pt ambulates with increased L lateral lean with increased knee extension moment during L stance phase - noted pain during stance phase  ROM:  WFL  STRENGTH: WFL  Based on manual resistance to B hip flexion, B hip abduction, B knee extension, B knee flexion  FLEXIBILITY:  Muscle length impairment to L>R hamstring, L>R iliopsoas, L>R TFL, L>R rectus femoris  SPECIAL TESTS:  Positive Daniel testing L with medial joint line pain  FUNCTIONAL TESTS: Double Leg Squat: Anterior knee translation, Knee valgus, Hip internal rotation, and Improper use of glutes/hips  SLS: Increased deviation of hip and ankle strategy on L LE without LOB  PALPATION:  Tenderness to palpation most notably at L medial joint line of L knee, additional tenderness noted to L VMO, increased muscle tension to palpation to L distal hip adductors, L medial hamstring    Assessment & Plan   CLINICAL IMPRESSIONS  Medical Diagnosis: (P) L knee pain    Treatment Diagnosis: (P) L knee pain with strength and mobility deficits   Impression/Assessment: Patient is a 44 year old male with L knee complaints.  The following significant findings have been identified: Pain, Decreased ROM/flexibility, Decreased joint mobility, Decreased strength, Impaired balance, Decreased proprioception, Impaired gait, Impaired muscle performance, and Decreased activity tolerance. These impairments interfere with their ability to perform recreational activities, household chores, household mobility, and community mobility as compared to previous level of function.     Clinical Decision Making (Complexity):  Clinical Presentation: Evolving/Changing  Clinical Presentation Rationale: based on medical and personal factors listed in PT evaluation  Clinical Decision Making (Complexity): Low complexity    PLAN OF CARE  Treatment Interventions:  Interventions: Gait Training, Manual Therapy, Neuromuscular Re-education, Therapeutic  Activity, Therapeutic Exercise, Self-Care/Home Management    Long Term Goals     PT Goal 1  Goal Identifier: (P) walking  Goal Description: (P) Pt will express tolerance of walking 2+ miles with 2/10 pain or less in knees  Rationale: (P) to maximize safety and independence with performance of ADLs and functional tasks  Target Date: (P) 04/15/25  PT Goal 2  Goal Identifier: (P) squatting  Goal Description: (P) Pt will demonstrate tolerance of squatting 50# or more with 2/10 pain or less in knees  Rationale: (P) to maximize safety and independence with performance of ADLs and functional tasks  Target Date: (P) 04/15/25      Frequency of Treatment: (P) 1x/week  Duration of Treatment: (P) 10 weeks    Education Assessment:   Learner/Method: (P) Patient;Demonstration;Pictures/Video;No Barriers to Learning    Risks and benefits of evaluation/treatment have been explained.   Patient/Family/caregiver agrees with Plan of Care.     Evaluation Time:     PT Rebeca Low Complexity Minutes (12412): (P) 20     Signing Clinician: VITA HUMPHRIES

## 2025-03-06 ENCOUNTER — MYC REFILL (OUTPATIENT)
Dept: FAMILY MEDICINE | Facility: CLINIC | Age: 45
End: 2025-03-06
Payer: COMMERCIAL

## 2025-03-06 DIAGNOSIS — F90.2 ADHD (ATTENTION DEFICIT HYPERACTIVITY DISORDER), COMBINED TYPE: Chronic | ICD-10-CM

## 2025-03-06 NOTE — TELEPHONE ENCOUNTER
"Patient Comment: Hi, I need new prescription prescriptions sent to the pharmacy because the previous pharmacy where they were on file has permanently closed     Request for medication refill:    Medication Name: methylphenidate HCl ER, OSM, (CONCERTA) 54 MG CR tablet     Providers if patient needs an appointment and you are willing to give a one month supply please refill for one month and  send a MyChart using \".SMILLIMITEDREFILL\" .Or route chart to \"P [facility] \" . To call patient and inform of limited refill and providers request to make an appointment. (Giving one month refill in non controlled medications is strongly recommended before denial)    If refill has been denied, meaning absolutely no refills without visit, please complete the smart phrase \".SMIRXREFUSE\" and route it to the \"P [facility] MED REFILLS\"  pool to inform the patient and the pharmacy.    Laisha Archer RN    ----------  PDMP reveiwed, appropriate. RX signed.  Edwin Link MD    "

## 2025-03-09 ENCOUNTER — MYC REFILL (OUTPATIENT)
Dept: FAMILY MEDICINE | Facility: CLINIC | Age: 45
End: 2025-03-09
Payer: COMMERCIAL

## 2025-03-09 DIAGNOSIS — G47.00 INSOMNIA, UNSPECIFIED TYPE: ICD-10-CM

## 2025-03-09 RX ORDER — METHYLPHENIDATE HYDROCHLORIDE 54 MG/1
54 TABLET ORAL DAILY
Qty: 30 TABLET | Refills: 0 | Status: SHIPPED | OUTPATIENT
Start: 2025-03-09

## 2025-03-10 RX ORDER — TRAZODONE HYDROCHLORIDE 50 MG/1
150 TABLET ORAL AT BEDTIME
Qty: 270 TABLET | Refills: 1 | Status: SHIPPED | OUTPATIENT
Start: 2025-03-10

## 2025-03-10 NOTE — TELEPHONE ENCOUNTER
"Request for medication refill:    Medication Name:     traZODone (DESYREL) 50 MG tablet       Providers if patient needs an appointment and you are willing to give a one month supply please refill for one month and  send a MyChart using \".SMILLIMITEDREFILL\" .Or route chart to \"P [facility] \" . To call patient and inform of limited refill and providers request to make an appointment. (Giving one month refill in non controlled medications is strongly recommended before denial)    If refill has been denied, meaning absolutely no refills without visit, please complete the smart phrase \".SMIRXREFUSE\" and route it to the \"P [facility] MED REFILLS\"  pool to inform the patient and the pharmacy.    Christine León, [facility]    "

## 2025-03-17 ENCOUNTER — PATIENT OUTREACH (OUTPATIENT)
Dept: CARE COORDINATION | Facility: CLINIC | Age: 45
End: 2025-03-17
Payer: COMMERCIAL

## 2025-04-09 ENCOUNTER — MYC REFILL (OUTPATIENT)
Dept: FAMILY MEDICINE | Facility: CLINIC | Age: 45
End: 2025-04-09
Payer: COMMERCIAL

## 2025-04-09 DIAGNOSIS — F90.2 ADHD (ATTENTION DEFICIT HYPERACTIVITY DISORDER), COMBINED TYPE: Chronic | ICD-10-CM

## 2025-04-09 NOTE — TELEPHONE ENCOUNTER
"Request for medication refill:    Medication Name: methylphenidate HCl ER, OSM, (CONCERTA) 54 MG CR tablet     Providers if patient needs an appointment and you are willing to give a one month supply please refill for one month and  send a MyChart using \".SMILLIMITEDREFILL\" .Or route chart to \"P [facility] \" . To call patient and inform of limited refill and providers request to make an appointment. (Giving one month refill in non controlled medications is strongly recommended before denial)    If refill has been denied, meaning absolutely no refills without visit, please complete the smart phrase \".SMIRXREFUSE\" and route it to the \"P [facility] MED REFILLS\"  pool to inform the patient and the pharmacy.    Arsenio Ortega MA   -------------------  PDMP reviewed, has appointment next month. Filled  Edwin Link MD    "

## 2025-04-10 RX ORDER — METHYLPHENIDATE HYDROCHLORIDE 54 MG/1
54 TABLET ORAL DAILY
Qty: 30 TABLET | Refills: 0 | Status: SHIPPED | OUTPATIENT
Start: 2025-04-10

## 2025-05-12 PROBLEM — M25.562 PATELLOFEMORAL ARTHRALGIA OF LEFT KNEE: Status: RESOLVED | Noted: 2025-02-04 | Resolved: 2025-05-12

## 2025-05-14 ENCOUNTER — MYC REFILL (OUTPATIENT)
Dept: FAMILY MEDICINE | Facility: CLINIC | Age: 45
End: 2025-05-14

## 2025-05-14 ENCOUNTER — MYC MEDICAL ADVICE (OUTPATIENT)
Dept: FAMILY MEDICINE | Facility: CLINIC | Age: 45
End: 2025-05-14

## 2025-05-14 ENCOUNTER — TELEPHONE (OUTPATIENT)
Dept: FAMILY MEDICINE | Facility: CLINIC | Age: 45
End: 2025-05-14

## 2025-05-14 DIAGNOSIS — F90.2 ADHD (ATTENTION DEFICIT HYPERACTIVITY DISORDER), COMBINED TYPE: Chronic | ICD-10-CM

## 2025-05-14 RX ORDER — METHYLPHENIDATE HYDROCHLORIDE 54 MG/1
54 TABLET ORAL DAILY
Qty: 30 TABLET | Refills: 0 | OUTPATIENT
Start: 2025-05-14

## 2025-05-14 NOTE — TELEPHONE ENCOUNTER
"Request for medication refill:    Medication Name:     methylphenidate HCl ER, OSM, (CONCERTA) 54 MG CR tablet     Providers if patient needs an appointment and you are willing to give a one month supply please refill for one month and  send a MyChart using \".SMILLIMITEDREFILL\" .Or route chart to \"P [facility] \" . To call patient and inform of limited refill and providers request to make an appointment. (Giving one month refill in non controlled medications is strongly recommended before denial)    If refill has been denied, meaning absolutely no refills without visit, please complete the smart phrase \".SMIRXREFUSE\" and route it to the \"P [facility] MED REFILLS\"  pool to inform the patient and the pharmacy.    Manuela Valdovinos MA   --------  Patient overdue for appointment, cancelled todays and sent refill request. Sent MyChart to make another appointment, and once scheduled can send a bridge refill until seen in clinic.  Edwin Link MD  -------  Addendum 5/15 @ 0730  Patient scheduled 5/19, ordered 30 day supply  Edwin Link MD    " 37.3

## 2025-05-14 NOTE — TELEPHONE ENCOUNTER
Prior Authorization Retail Medication Request    Medication/Dose: ZEPBOUND 15 MG/0.5ML prefilled pen  Diagnosis and ICD code (if different than what is on RX):    New/renewal/insurance change PA/secondary ins. PA:  Previously Tried and Failed:    Rationale:      Insurance   Primary: [employer] OUT OF STATE   Insurance ID:  [id number]     Secondary (if applicable):  Insurance ID:      Pharmacy Information (if different than what is on RX)  Name:    Phone:    Fax:    Clinic Information  Preferred routing pool for dept communication: dimas polk prior auth

## 2025-05-15 RX ORDER — METHYLPHENIDATE HYDROCHLORIDE 54 MG/1
54 TABLET ORAL DAILY
Qty: 30 TABLET | Refills: 0 | Status: SHIPPED | OUTPATIENT
Start: 2025-05-15

## 2025-05-18 ENCOUNTER — HEALTH MAINTENANCE LETTER (OUTPATIENT)
Age: 45
End: 2025-05-18

## 2025-05-22 NOTE — TELEPHONE ENCOUNTER
Additional criteria questions answered over the phone with [employer] rep. Documentation of patient's starting and current BMI and weight faxed to [employer] PA Dept at [phone] for review. PA case# [id number].

## 2025-05-29 NOTE — TELEPHONE ENCOUNTER
Prior Authorization Not Needed per Insurance    Medication: ZEPBOUND 15 MG/0.5ML SC SOAJ  Insurance Company: [employer] Non-Specialty PA's - Phone [phone] Fax [phone]  Expected CoPay: $    Pharmacy Filling the Rx: [facility] - [facility] [place], [place] - [address] [place] [address]  Pharmacy Notified: Yes